# Patient Record
Sex: MALE | Race: WHITE | URBAN - METROPOLITAN AREA
[De-identification: names, ages, dates, MRNs, and addresses within clinical notes are randomized per-mention and may not be internally consistent; named-entity substitution may affect disease eponyms.]

---

## 2020-06-23 ENCOUNTER — OUTPATIENT (OUTPATIENT)
Dept: OUTPATIENT SERVICES | Facility: HOSPITAL | Age: 85
LOS: 1 days | End: 2020-06-23
Payer: MEDICARE

## 2020-06-23 VITALS
HEIGHT: 62.5 IN | RESPIRATION RATE: 16 BRPM | TEMPERATURE: 98 F | SYSTOLIC BLOOD PRESSURE: 136 MMHG | WEIGHT: 182.1 LBS | OXYGEN SATURATION: 97 % | HEART RATE: 76 BPM | DIASTOLIC BLOOD PRESSURE: 68 MMHG

## 2020-06-23 DIAGNOSIS — K40.90 UNILATERAL INGUINAL HERNIA, WITHOUT OBSTRUCTION OR GANGRENE, NOT SPECIFIED AS RECURRENT: ICD-10-CM

## 2020-06-23 DIAGNOSIS — Z98.890 OTHER SPECIFIED POSTPROCEDURAL STATES: Chronic | ICD-10-CM

## 2020-06-23 PROBLEM — Z00.00 ENCOUNTER FOR PREVENTIVE HEALTH EXAMINATION: Status: ACTIVE | Noted: 2020-06-23

## 2020-06-23 LAB
ANION GAP SERPL CALC-SCNC: 15 MMO/L — HIGH (ref 7–14)
BLD GP AB SCN SERPL QL: NEGATIVE — SIGNIFICANT CHANGE UP
BUN SERPL-MCNC: 25 MG/DL — HIGH (ref 7–23)
CALCIUM SERPL-MCNC: 9.4 MG/DL — SIGNIFICANT CHANGE UP (ref 8.4–10.5)
CHLORIDE SERPL-SCNC: 102 MMOL/L — SIGNIFICANT CHANGE UP (ref 98–107)
CO2 SERPL-SCNC: 19 MMOL/L — LOW (ref 22–31)
CREAT SERPL-MCNC: 1.55 MG/DL — HIGH (ref 0.5–1.3)
GLUCOSE SERPL-MCNC: 68 MG/DL — LOW (ref 70–99)
HCT VFR BLD CALC: 40 % — SIGNIFICANT CHANGE UP (ref 39–50)
HGB BLD-MCNC: 13.2 G/DL — SIGNIFICANT CHANGE UP (ref 13–17)
MCHC RBC-ENTMCNC: 29.1 PG — SIGNIFICANT CHANGE UP (ref 27–34)
MCHC RBC-ENTMCNC: 33 % — SIGNIFICANT CHANGE UP (ref 32–36)
MCV RBC AUTO: 88.3 FL — SIGNIFICANT CHANGE UP (ref 80–100)
NRBC # FLD: 0 K/UL — SIGNIFICANT CHANGE UP (ref 0–0)
PLATELET # BLD AUTO: 347 K/UL — SIGNIFICANT CHANGE UP (ref 150–400)
PMV BLD: 12.2 FL — SIGNIFICANT CHANGE UP (ref 7–13)
POTASSIUM SERPL-MCNC: 4.4 MMOL/L — SIGNIFICANT CHANGE UP (ref 3.5–5.3)
POTASSIUM SERPL-SCNC: 4.4 MMOL/L — SIGNIFICANT CHANGE UP (ref 3.5–5.3)
RBC # BLD: 4.53 M/UL — SIGNIFICANT CHANGE UP (ref 4.2–5.8)
RBC # FLD: 14.9 % — HIGH (ref 10.3–14.5)
RH IG SCN BLD-IMP: POSITIVE — SIGNIFICANT CHANGE UP
SODIUM SERPL-SCNC: 136 MMOL/L — SIGNIFICANT CHANGE UP (ref 135–145)
WBC # BLD: 11.67 K/UL — HIGH (ref 3.8–10.5)
WBC # FLD AUTO: 11.67 K/UL — HIGH (ref 3.8–10.5)

## 2020-06-23 PROCEDURE — 93010 ELECTROCARDIOGRAM REPORT: CPT

## 2020-06-23 RX ORDER — SODIUM CHLORIDE 9 MG/ML
1000 INJECTION, SOLUTION INTRAVENOUS
Refills: 0 | Status: DISCONTINUED | OUTPATIENT
Start: 2020-07-01 | End: 2020-07-02

## 2020-06-23 RX ORDER — BRIMONIDINE TARTRATE 2 MG/MG
1 SOLUTION/ DROPS OPHTHALMIC
Qty: 0 | Refills: 0 | DISCHARGE

## 2020-06-23 NOTE — H&P PST ADULT - NSICDXPASTSURGICALHX_GEN_ALL_CORE_FT
PAST SURGICAL HISTORY:  History of prostate surgery 2019    S/P ORIF (open reduction internal fixation) fracture right leg 1988

## 2020-06-23 NOTE — H&P PST ADULT - RS GEN PE MLT RESP DETAILS PC
good air movement/breath sounds equal/no rales/no wheezes/clear to auscultation bilaterally/respirations non-labored/no rhonchi/airway patent

## 2020-06-23 NOTE — H&P PST ADULT - ASSESSMENT
unilateral inguinal hernia, without obstruction Problem: unilateral inguinal hernia, without obstruction   Assessment and Plan: Pt is tentatively scheduled for repair of incarcerated left inguinal hernia with mesh for 7/1/20. Pre-op instructions provided. Pt given verbal and written instructions with teach back on chlorhexidine shampoo and pepcid. Pt verbalized understanding with return demonstration.   Pt is scheduled for COVID-19 test on 6/28    Problem: A fib  Assessment and Plan: cardiac evaluation with echo stress test and ekg in chart.

## 2020-06-23 NOTE — H&P PST ADULT - NEGATIVE NEUROLOGICAL SYMPTOMS
no generalized seizures/no syncope/no tremors/no transient paralysis/no weakness/no paresthesias/no headache

## 2020-06-23 NOTE — H&P PST ADULT - NSICDXPASTMEDICALHX_GEN_ALL_CORE_FT
PAST MEDICAL HISTORY:  Glaucoma     History of BPH     Inguinal hernia left    Macular degeneration PAST MEDICAL HISTORY:  Atrial fibrillation     Glaucoma     History of BPH     Inguinal hernia left    Macular degeneration

## 2020-06-23 NOTE — H&P PST ADULT - NSANTHOSAYNRD_GEN_A_CORE
No. SERJIO screening performed.  STOP BANG Legend: 0-2 = LOW Risk; 3-4 = INTERMEDIATE Risk; 5-8 = HIGH Risk

## 2020-06-23 NOTE — H&P PST ADULT - BACK EXAM
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   Normal device function with normal pacing impedance, sensing, and capture thresholds.  Acceptable battery longevity 7.0 years  3 AMS episodes longest 10 seconds  Episode of PMT      
In the setting of IHSS  Stable.   
ROM intact/normal shape/strength intact

## 2020-06-23 NOTE — H&P PST ADULT - HISTORY OF PRESENT ILLNESS
89 year old male presents to presurgical testing with diagnosis of unilateral inguinal hernia, without obstruction scheduled for repair of incarcerated left inguinal hernia with mesh. Pt complaining of bothersome left inguinal hernia, causing difficulty walking.

## 2020-06-28 ENCOUNTER — APPOINTMENT (OUTPATIENT)
Dept: DISASTER EMERGENCY | Facility: CLINIC | Age: 85
End: 2020-06-28

## 2020-06-28 DIAGNOSIS — Z01.818 ENCOUNTER FOR OTHER PREPROCEDURAL EXAMINATION: ICD-10-CM

## 2020-06-29 LAB — SARS-COV-2 N GENE NPH QL NAA+PROBE: NOT DETECTED

## 2020-06-30 ENCOUNTER — TRANSCRIPTION ENCOUNTER (OUTPATIENT)
Age: 85
End: 2020-06-30

## 2020-07-01 ENCOUNTER — RESULT REVIEW (OUTPATIENT)
Age: 85
End: 2020-07-01

## 2020-07-01 ENCOUNTER — INPATIENT (INPATIENT)
Facility: HOSPITAL | Age: 85
LOS: 1 days | Discharge: HOME | End: 2020-07-03
Attending: SURGERY | Admitting: SURGERY
Payer: MEDICARE

## 2020-07-01 VITALS
TEMPERATURE: 98 F | WEIGHT: 182.1 LBS | SYSTOLIC BLOOD PRESSURE: 155 MMHG | HEIGHT: 62.5 IN | DIASTOLIC BLOOD PRESSURE: 70 MMHG | RESPIRATION RATE: 16 BRPM | OXYGEN SATURATION: 98 % | HEART RATE: 60 BPM

## 2020-07-01 DIAGNOSIS — I48.91 UNSPECIFIED ATRIAL FIBRILLATION: ICD-10-CM

## 2020-07-01 DIAGNOSIS — Z98.890 OTHER SPECIFIED POSTPROCEDURAL STATES: Chronic | ICD-10-CM

## 2020-07-01 DIAGNOSIS — K40.90 UNILATERAL INGUINAL HERNIA, WITHOUT OBSTRUCTION OR GANGRENE, NOT SPECIFIED AS RECURRENT: ICD-10-CM

## 2020-07-01 LAB
ALBUMIN SERPL ELPH-MCNC: 3.3 G/DL — SIGNIFICANT CHANGE UP (ref 3.3–5)
ALP SERPL-CCNC: 111 U/L — SIGNIFICANT CHANGE UP (ref 40–120)
ALT FLD-CCNC: 19 U/L — SIGNIFICANT CHANGE UP (ref 4–41)
ANION GAP SERPL CALC-SCNC: 14 MMO/L — SIGNIFICANT CHANGE UP (ref 7–14)
APTT BLD: 35.5 SEC — SIGNIFICANT CHANGE UP (ref 27–36.3)
AST SERPL-CCNC: 19 U/L — SIGNIFICANT CHANGE UP (ref 4–40)
BILIRUB SERPL-MCNC: 0.3 MG/DL — SIGNIFICANT CHANGE UP (ref 0.2–1.2)
BUN SERPL-MCNC: 21 MG/DL — SIGNIFICANT CHANGE UP (ref 7–23)
CALCIUM SERPL-MCNC: 8.5 MG/DL — SIGNIFICANT CHANGE UP (ref 8.4–10.5)
CHLORIDE SERPL-SCNC: 106 MMOL/L — SIGNIFICANT CHANGE UP (ref 98–107)
CO2 SERPL-SCNC: 17 MMOL/L — LOW (ref 22–31)
CREAT SERPL-MCNC: 1.31 MG/DL — HIGH (ref 0.5–1.3)
GLUCOSE BLDC GLUCOMTR-MCNC: 118 MG/DL — HIGH (ref 70–99)
GLUCOSE BLDC GLUCOMTR-MCNC: 92 MG/DL — SIGNIFICANT CHANGE UP (ref 70–99)
GLUCOSE SERPL-MCNC: 133 MG/DL — HIGH (ref 70–99)
HCT VFR BLD CALC: 39 % — SIGNIFICANT CHANGE UP (ref 39–50)
HGB BLD-MCNC: 12.9 G/DL — LOW (ref 13–17)
INR BLD: 1.21 — HIGH (ref 0.88–1.17)
MCHC RBC-ENTMCNC: 28.8 PG — SIGNIFICANT CHANGE UP (ref 27–34)
MCHC RBC-ENTMCNC: 33.1 % — SIGNIFICANT CHANGE UP (ref 32–36)
MCV RBC AUTO: 87.1 FL — SIGNIFICANT CHANGE UP (ref 80–100)
NRBC # FLD: 0 K/UL — SIGNIFICANT CHANGE UP (ref 0–0)
PLATELET # BLD AUTO: 286 K/UL — SIGNIFICANT CHANGE UP (ref 150–400)
PMV BLD: 9.6 FL — SIGNIFICANT CHANGE UP (ref 7–13)
POTASSIUM SERPL-MCNC: 3.8 MMOL/L — SIGNIFICANT CHANGE UP (ref 3.5–5.3)
POTASSIUM SERPL-SCNC: 3.8 MMOL/L — SIGNIFICANT CHANGE UP (ref 3.5–5.3)
PROT SERPL-MCNC: 6.2 G/DL — SIGNIFICANT CHANGE UP (ref 6–8.3)
PROTHROM AB SERPL-ACNC: 14 SEC — HIGH (ref 9.8–13.1)
RBC # BLD: 4.48 M/UL — SIGNIFICANT CHANGE UP (ref 4.2–5.8)
RBC # FLD: 15.2 % — HIGH (ref 10.3–14.5)
RH IG SCN BLD-IMP: POSITIVE — SIGNIFICANT CHANGE UP
SODIUM SERPL-SCNC: 137 MMOL/L — SIGNIFICANT CHANGE UP (ref 135–145)
WBC # BLD: 13.22 K/UL — HIGH (ref 3.8–10.5)
WBC # FLD AUTO: 13.22 K/UL — HIGH (ref 3.8–10.5)

## 2020-07-01 PROCEDURE — 70450 CT HEAD/BRAIN W/O DYE: CPT | Mod: 26

## 2020-07-01 PROCEDURE — 88302 TISSUE EXAM BY PATHOLOGIST: CPT | Mod: 26

## 2020-07-01 RX ORDER — ENOXAPARIN SODIUM 100 MG/ML
40 INJECTION SUBCUTANEOUS DAILY
Refills: 0 | Status: DISCONTINUED | OUTPATIENT
Start: 2020-07-02 | End: 2020-07-03

## 2020-07-01 RX ORDER — ACETAMINOPHEN 500 MG
1000 TABLET ORAL EVERY 6 HOURS
Refills: 0 | Status: COMPLETED | OUTPATIENT
Start: 2020-07-01 | End: 2020-07-02

## 2020-07-01 RX ADMIN — Medication 400 MILLIGRAM(S): at 21:47

## 2020-07-01 NOTE — CONSULT NOTE ADULT - ASSESSMENT
NOTE INCOMPLETE Assessment: 88yo unknown handedness man with h/o afib not on AC (unclear reason), macular degeneration, hearing loss, glaucoma, presenting as code stroke with LKW prior to surgery (2:30p) for slurred speech 20 min after surgery. Neurological examination demonstrates mild dysarthria, baseline left facial asymmetry. CTH noncon negative per verbal read.     Impression: mild dysarthria 2/2 patient likely from post-op anesthesia, low suspicion of stroke      Recommendations:  [ ] no further neurological workup  [ ] patient should discuss with PCP/cardiologist about anticoagulation for atrial fibrillation vs watchman  [ ] f/u with Dr. Sexton Stroke Attending as outpatient  [phone]    -Management & disposition discussed with Stroke attending, Dr. Libman; to d/w Neurology Attending Dr. Stevenson

## 2020-07-01 NOTE — BRIEF OPERATIVE NOTE - NSICDXBRIEFPROCEDURE_GEN_ALL_CORE_FT
PROCEDURES:  Open repair of incarcerated inguinal hernia using mesh in adult 01-Jul-2020 17:49:20  Idalia Soto

## 2020-07-01 NOTE — ASU PATIENT PROFILE, ADULT - PSH
History of prostate surgery  2019  S/P ORIF (open reduction internal fixation) fracture  right leg 1988

## 2020-07-01 NOTE — BRIEF OPERATIVE NOTE - OPERATION/FINDINGS
Reduction of abdominal contents from hernia sac was successful. Large left indirect inguinal hernia repaired with reinforcement by implementation of synthetic mesh between abdominal internal oblique muscle and aponeurosis of abdominal external oblique muscle. Small lipoma was excised along with hernia sac.

## 2020-07-02 ENCOUNTER — TRANSCRIPTION ENCOUNTER (OUTPATIENT)
Age: 85
End: 2020-07-02

## 2020-07-02 LAB
ANION GAP SERPL CALC-SCNC: 15 MMO/L — HIGH (ref 7–14)
BUN SERPL-MCNC: 22 MG/DL — SIGNIFICANT CHANGE UP (ref 7–23)
CALCIUM SERPL-MCNC: 8.9 MG/DL — SIGNIFICANT CHANGE UP (ref 8.4–10.5)
CHLORIDE SERPL-SCNC: 106 MMOL/L — SIGNIFICANT CHANGE UP (ref 98–107)
CO2 SERPL-SCNC: 16 MMOL/L — LOW (ref 22–31)
CREAT SERPL-MCNC: 1.35 MG/DL — HIGH (ref 0.5–1.3)
GLUCOSE SERPL-MCNC: 115 MG/DL — HIGH (ref 70–99)
HCT VFR BLD CALC: 37.7 % — LOW (ref 39–50)
HGB BLD-MCNC: 12.7 G/DL — LOW (ref 13–17)
MAGNESIUM SERPL-MCNC: 1.6 MG/DL — SIGNIFICANT CHANGE UP (ref 1.6–2.6)
MCHC RBC-ENTMCNC: 29 PG — SIGNIFICANT CHANGE UP (ref 27–34)
MCHC RBC-ENTMCNC: 33.7 % — SIGNIFICANT CHANGE UP (ref 32–36)
MCV RBC AUTO: 86.1 FL — SIGNIFICANT CHANGE UP (ref 80–100)
NRBC # FLD: 0 K/UL — SIGNIFICANT CHANGE UP (ref 0–0)
PHOSPHATE SERPL-MCNC: 3.9 MG/DL — SIGNIFICANT CHANGE UP (ref 2.5–4.5)
PLATELET # BLD AUTO: 263 K/UL — SIGNIFICANT CHANGE UP (ref 150–400)
PMV BLD: 10.2 FL — SIGNIFICANT CHANGE UP (ref 7–13)
POTASSIUM SERPL-MCNC: 4 MMOL/L — SIGNIFICANT CHANGE UP (ref 3.5–5.3)
POTASSIUM SERPL-SCNC: 4 MMOL/L — SIGNIFICANT CHANGE UP (ref 3.5–5.3)
RBC # BLD: 4.38 M/UL — SIGNIFICANT CHANGE UP (ref 4.2–5.8)
RBC # FLD: 15.1 % — HIGH (ref 10.3–14.5)
SODIUM SERPL-SCNC: 137 MMOL/L — SIGNIFICANT CHANGE UP (ref 135–145)
TROPONIN T, HIGH SENSITIVITY: 45 NG/L — SIGNIFICANT CHANGE UP (ref ?–14)
TROPONIN T, HIGH SENSITIVITY: 49 NG/L — SIGNIFICANT CHANGE UP (ref ?–14)
TROPONIN T, HIGH SENSITIVITY: 56 NG/L — CRITICAL HIGH (ref ?–14)
WBC # BLD: 15.83 K/UL — HIGH (ref 3.8–10.5)
WBC # FLD AUTO: 15.83 K/UL — HIGH (ref 3.8–10.5)

## 2020-07-02 PROCEDURE — 99232 SBSQ HOSP IP/OBS MODERATE 35: CPT

## 2020-07-02 PROCEDURE — 99221 1ST HOSP IP/OBS SF/LOW 40: CPT

## 2020-07-02 PROCEDURE — 93010 ELECTROCARDIOGRAM REPORT: CPT

## 2020-07-02 RX ORDER — BRIMONIDINE TARTRATE 2 MG/MG
1 SOLUTION/ DROPS OPHTHALMIC
Refills: 0 | Status: DISCONTINUED | OUTPATIENT
Start: 2020-07-02 | End: 2020-07-03

## 2020-07-02 RX ORDER — LATANOPROST 0.05 MG/ML
1 SOLUTION/ DROPS OPHTHALMIC; TOPICAL AT BEDTIME
Refills: 0 | Status: DISCONTINUED | OUTPATIENT
Start: 2020-07-02 | End: 2020-07-03

## 2020-07-02 RX ORDER — SODIUM CHLORIDE 9 MG/ML
500 INJECTION, SOLUTION INTRAVENOUS ONCE
Refills: 0 | Status: DISCONTINUED | OUTPATIENT
Start: 2020-07-02 | End: 2020-07-03

## 2020-07-02 RX ORDER — MAGNESIUM SULFATE 500 MG/ML
2 VIAL (ML) INJECTION
Refills: 0 | Status: COMPLETED | OUTPATIENT
Start: 2020-07-02 | End: 2020-07-02

## 2020-07-02 RX ADMIN — ENOXAPARIN SODIUM 40 MILLIGRAM(S): 100 INJECTION SUBCUTANEOUS at 11:54

## 2020-07-02 RX ADMIN — Medication 400 MILLIGRAM(S): at 03:40

## 2020-07-02 RX ADMIN — Medication 400 MILLIGRAM(S): at 08:47

## 2020-07-02 RX ADMIN — Medication 50 GRAM(S): at 09:25

## 2020-07-02 RX ADMIN — Medication 50 GRAM(S): at 08:47

## 2020-07-02 RX ADMIN — Medication 400 MILLIGRAM(S): at 15:50

## 2020-07-02 RX ADMIN — LATANOPROST 1 DROP(S): 0.05 SOLUTION/ DROPS OPHTHALMIC; TOPICAL at 22:08

## 2020-07-02 RX ADMIN — BRIMONIDINE TARTRATE 1 DROP(S): 2 SOLUTION/ DROPS OPHTHALMIC at 17:51

## 2020-07-02 NOTE — CONSULT NOTE ADULT - ASSESSMENT
Assessment- 89/M with AFib not on AC s/p hernia surgery had e/o dysarthria, and facial asymmetry with subsequent troponin elevations, and CT brain showing moderate ischemic disease.     Plan-  1. Ischemia evaluation with a pharmacologic nuclear stress test prior to discharge if possible. Also recommend B/L carotid duplex.  2. Will start ASA 81 mg/d and atorvastatin 40 mg QHS-will defer to Neuro on consideration of clopidogrel.  3. Will recommend full discussion and strong recommendation of NOACs-may be done as outpatient.  4. If possible, obtain records from Dr Damon's office.    Thanks for the opportunity of participating in the care of this pt, please call [facility]  Cardiology  [phone]  For on-call coverage-  [email or web address] / password : [email or web address]

## 2020-07-02 NOTE — PROGRESS NOTE ADULT - ASSESSMENT
89yoM with PMHx of Afib, s/p LIH, post op course c/b stroke code, now with resolution of neurologic symptoms and negative CT Head. Overnight experienced bradycardia and increase in troponins    -Pain control  -Trend Troponins  -F/U Labs   -PT, Ambulation/OOB  -CLD --> ADAT   -F/U outpatient cardiologist   -DVT PPx    ATeam  [id number]

## 2020-07-02 NOTE — PHYSICAL THERAPY INITIAL EVALUATION ADULT - PERTINENT HX OF CURRENT PROBLEM, REHAB EVAL
89 year old man with h/o afib not on AC, macular degeneration, hearing loss, glaucoma, presenting as code stroke for slurred speech 20 min after surgery. Patient underwent inguinal hernia surgery and per nursing, after 20 minutes patient had slurred speech and were concerned for a stroke. CT head no acute findings.

## 2020-07-02 NOTE — DISCHARGE NOTE PROVIDER - NSDCCPCAREPLAN_GEN_ALL_CORE_FT
PRINCIPAL DISCHARGE DIAGNOSIS  Diagnosis: Unilateral inguinal hernia  Assessment and Plan of Treatment:       SECONDARY DISCHARGE DIAGNOSES  Diagnosis: Atrial fibrillation  Assessment and Plan of Treatment:

## 2020-07-02 NOTE — PHYSICAL THERAPY INITIAL EVALUATION ADULT - PLANNED THERAPY INTERVENTIONS, PT EVAL
gait training/bed mobility training/strengthening/transfer training/Patient left sitting in chair, in NAD, call bell in reach, all lines intact./balance training

## 2020-07-02 NOTE — CHART NOTE - NSCHARTNOTEFT_GEN_A_CORE
Pt developed Afib with pulse dropping to 36. Was stable with HR in 70s. EKG, CBC, BMP, Mg, Phos, and troponins.  Pt is stable. Will continue to monitor pt.

## 2020-07-02 NOTE — CONSULT NOTE ADULT - SUBJECTIVE AND OBJECTIVE BOX
88yo man with h/o afib not on AC (pt reportedly declined in the past-doesnot know about NOACs apparently), macular degeneration, hearing loss, glaucoma, presenting as code stroke 20 min after surgery. Patient underwent inguinal hernia surgery -and had a rapid response alert for dysarthria and slurred speech which improved spontaneously (unclear time window). CT head revealed no obvious ischemic /HGEic lesions-but had significant microvascular ischemic changes. No imaging has been done for assessment of IC circulation per my review.     Pt reports that he has frequently been feeling fatigued in the recent past-and has restricted his activities. Baseline mobility limited, and <4 METs. Denies chest pain at rest. Does report exertional dyspnea. Says he refused warfarin in the past as he lost few friends due to coumadin. When told about NOACs-he says he wants to learn more, but would defer for now. Worried about financial impact of hospitalization and testing. Shows me a small prescription paper from Dr Janice Damon ([place], [place]) which states 'no contraindication to surgery based on echo and stress test'-doesnt provide results of above studies.    Home Medications:  Advil: 1 tab(s) orally 2 times a day last dose on 6/24/20 (01 Jul 2020 10:44)  brimonidine 0.2% ophthalmic solution: 1 drop(s) to each affected eye 2 times a day (01 Jul 2020 10:44)  latanoprost 0.005% ophthalmic solution: 1 drop(s) to each affected eye once a day (in the evening) (01 Jul 2020 10:44)    Allergies    No Known Allergies    Intolerances    PAST MEDICAL & SURGICAL HISTORY:  Atrial fibrillation  Macular degeneration  History of BPH  Glaucoma  Inguinal hernia: left  History of prostate surgery: 2019  S/P ORIF (open reduction internal fixation) fracture: right leg 1988    Social History:   Lives with daughter.    FAMILY HISTORY:  Reports no family history of premature CAD or SCD-but states 'all of my 8 brothers are dead-3 in the war, rest of natural causes'.    Vital Signs Last 24 Hrs  T(C): 36.6 (02 Jul 2020 16:45), Max: 36.9 (02 Jul 2020 08:47)  T(F): 97.8 (02 Jul 2020 16:45), Max: 98.5 (02 Jul 2020 08:47)  HR: 66 (02 Jul 2020 16:45) (53 - 90)  BP: 123/60 (02 Jul 2020 16:45) (118/57 - 159/66)  BP(mean): 84 (02 Jul 2020 08:47) (84 - 84)  RR: 18 (02 Jul 2020 16:45) (16 - 20)  SpO2: 98% (02 Jul 2020 16:45) (97% - 99%)    Exam:  General Appearance:  This is a 89-year-old male, who answers questions appropriately and currently is in no apparent distress.  Skin:  Warm and dry without exanthem.    HEENT:  Normocephalic and atraumatic. Extraocular movements are intact.    Lungs:  Breath sounds are clear bilaterally without rales, rhonchi or wheezing.    Heart:  No elevation of JVP.  Irregular rhythm. S1-normal, S2-mild prolongation of A2-P2 split, with no appreciable gallops, rubs or extra heart sounds.  2/6 SM in LLSB with radx to carotids.  Abdomen:  Soft, nondistended in all quadrants. Mild tenderness to palpation RUQ+. Normal bowel sounds.  No palpable masses.    Peripheral Vascular:  Radial pulses are 2+ bilaterally.   Extremities:  Warm without clubbing, edema or cyanosis.    Neurological:  The patient is oriented to person, place and time.  Strength and sensation are grossly intact.  Face is symmetric.    EKG (June 23, 20-personally reviewed by me)-AFib with non-specific ST-T changes especially in the infero-lateral leads    CT Brain-No hydrocephalus, acute intracranial hemorrhage, mass effect, or brain edema.    Moderate white matter microvascular ischemic disease.
HPI: 88yo unknown handedness man with h/o afib not on AC (unclear reason), macular degeneration, hearing loss, glaucoma, presenting as code stroke with LKW prior to surgery (2:30p) for slurred speech 20 min after surgery. Patient underwent inguinal hernia surgery and per nursing, after 20 minutes patient had slurred speech and were concerned for a stroke.     (Stroke only)  NIHSS: 12 (though patient was spontaneously moving all extremities and withdrew equally to all extremities)  MRS: unknown at this time    REVIEW OF SYSTEMS	    A 10-system ROS was performed and is negative except for those items noted above and/or in the HPI.    PAST MEDICAL & SURGICAL HISTORY:  Atrial fibrillation  Macular degeneration  History of BPH  Glaucoma  Inguinal hernia: left  History of prostate surgery: 2019  S/P ORIF (open reduction internal fixation) fracture: right leg 1988    MEDICATIONS (HOME):  Home Medications:  Advil: 1 tab(s) orally 2 times a day last dose on 6/24/20 (01 Jul 2020 10:44)  brimonidine 0.2% ophthalmic solution: 1 drop(s) to each affected eye 2 times a day (01 Jul 2020 10:44)  latanoprost 0.005% ophthalmic solution: 1 drop(s) to each affected eye once a day (in the evening) (01 Jul 2020 10:44)    MEDICATIONS  (STANDING):  lactated ringers. 1000 milliLiter(s) (30 mL/Hr) IV Continuous <Continuous>    MEDICATIONS  (PRN):    ALLERGIES/INTOLERANCES:  Allergies  No Known Allergies    VITALS & EXAMINATION:  Vital Signs Last 24 Hrs  T(C): 36.8 (01 Jul 2020 10:15), Max: 36.8 (01 Jul 2020 10:15)  T(F): 98.2 (01 Jul 2020 10:15), Max: 98.2 (01 Jul 2020 10:15)  HR: 60 (01 Jul 2020 10:15) (60 - 60)  BP: 155/70 (01 Jul 2020 10:15) (155/70 - 155/70)  BP(mean): --  RR: 16 (01 Jul 2020 10:15) (16 - 16)  SpO2: 98% (01 Jul 2020 10:15) (98% - 98%)    Neurological (>12):  MS: eyes open, after prompting patient knows where he is, knows the month, speech mildly dysarthric, intact naming, difficult to evaluate repetition; follows some simple commands     CNs: PERRL (R = 3mm, L = 3mm). VFF to threat b/l. EOMI no nystagmus, mild left nasolabial flattening (appears to be baseline), Hearing grossly normal (rubbing fingers) b/l.      Motor: patient's limbs hit bed, arms within 10 seconds, legs within 5 seconds - however, patient noted to move all extremities equally and spontaneously	     Sensation: withdraws all extremities equally to noxious stimuli    Coordination: unable to test    Gait: deferred    LABORATORY:  CBC                       12.9   13.22 )-----------( 286      ( 01 Jul 2020 16:05 )             39.0       STUDIES & IMAGING:  Studies (EKG, EEG, EMG, etc):     Radiology (XR, CT, MR, U/S, TTE/DAVIDSON):    pending [facility] noncon read

## 2020-07-02 NOTE — DISCHARGE NOTE PROVIDER - PROVIDER TOKENS
PROVIDER:[TOKEN:[[id number]],FOLLOWUP:[1 week]] PROVIDER:[TOKEN:[[id number]],FOLLOWUP:[1 week]],PROVIDER:[TOKEN:[[id number]],FOLLOWUP:[2 weeks]]

## 2020-07-02 NOTE — DISCHARGE NOTE PROVIDER - NSDCCPTREATMENT_GEN_ALL_CORE_FT
PRINCIPAL PROCEDURE  Procedure: Open repair of incarcerated inguinal hernia using mesh in adult  Findings and Treatment:

## 2020-07-02 NOTE — DISCHARGE NOTE PROVIDER - HOSPITAL COURSE
89yoM with PMHx of Afib (no anticoagulation) presented yesterday for an elective Left Inguinal Hernia Repair. Post-op a Code Stroke was called, patient was noted to be dysarthric. STAT CT Head revealed no evidence of stroke and patients symptoms subsequently improved. He was admitted and overnight noted to be bradycardic while in Afib. EKG confirmed afib. Troponins slightly elevated but downtrending and EKG unchanged from previous, patient also denied chest pain. Outside cardiologist contacted......... Patient advanced to regular diet and PT ordered. Patient remained hemodynamically stable on POD #1 and tolerated a diet, his pain is well controlled and he is stable for discharge. 89yoM with PMHx of Afib (no anticoagulation) presented yesterday for an elective Left Inguinal Hernia Repair. Post-op a Code Stroke was called, patient was noted to be dysarthric. STAT CT Head revealed no evidence of stroke and patients symptoms subsequently improved. He was admitted and overnight noted to be bradycardic while in Afib. EKG confirmed afib. Troponins slightly elevated but downtrending and EKG unchanged from previous, patient also denied chest pain. Patient advanced to regular diet and PT ordered. Patient remained hemodynamically stable on POD #1 and tolerated a diet, his pain is well controlled and he is stable for discharge. On POD#2, patient underwent bilateral carotid duplex ultrasound. As per cardiology, patient is stable for discharge and should continue with ASA/statin. Pt should have close outpatient cardiology follow up, and discuss use of anticoagulation for AF w/ outpatient cardiologist. 89yoM with PMHx of Afib (no anticoagulation) presented yesterday for an elective Left Inguinal Hernia Repair. Post-op a Code Stroke was called, patient was noted to be dysarthric. STAT CT Head revealed no evidence of stroke and patients symptoms subsequently improved. He was admitted and overnight noted to be bradycardic while in Afib. EKG confirmed afib. Troponins slightly elevated but downtrending and EKG unchanged from previous, patient also denied chest pain. Patient advanced to regular diet and PT ordered. Patient remained hemodynamically stable on POD #1 and tolerated a diet, his pain is well controlled and he is stable for discharge. On POD#2, patient underwent bilateral carotid duplex ultrasound. As per cardiology, patient is stable for discharge and should continue with ASA/statin. Pt should have close outpatient cardiology (Dr. Janice Damon) follow up within the next week, and discuss use of anticoagulation for AF w/ outpatient cardiologist. 89yo M with PMH of Afib (no anticoagulation) presented yesterday for an elective Left Inguinal Hernia Repair. Post-op a Code Stroke was called, patient was noted to be dysarthric. STAT CT Head revealed no evidence of stroke and patients symptoms subsequently improved. He was admitted and overnight noted to be bradycardic while in Afib. EKG confirmed afib. Troponins slightly elevated but downtrending and EKG unchanged from previous, patient also denied chest pain. Patient advanced to regular diet and PT ordered. Patient remained hemodynamically stable on POD #1 and tolerated a diet, his pain is well controlled and he is stable for discharge. On POD#2, patient underwent bilateral carotid duplex ultrasound. As per cardiology, patient is stable for discharge and should continue with ASA/statin. Pt should have close outpatient cardiology (Dr. Janice Damon) follow up within the next week, and discuss use of anticoagulation for AF w/ outpatient cardiologist.

## 2020-07-02 NOTE — DISCHARGE NOTE PROVIDER - NSDCMRMEDTOKEN_GEN_ALL_CORE_FT
Advil: 1 tab(s) orally 2 times a day last dose on 6/24/20  brimonidine 0.2% ophthalmic solution: 1 drop(s) to each affected eye 2 times a day  latanoprost 0.005% ophthalmic solution: 1 drop(s) to each affected eye once a day (in the evening) Advil: 1 tab(s) orally 2 times a day last dose on 6/24/20  amoxicillin-clavulanate 875 mg-125 mg oral tablet: 1 tab(s) orally every 12 hours  aspirin 81 mg oral tablet, chewable: 1 tab(s) orally once a day  atorvastatin 40 mg oral tablet: 1 tab(s) orally once a day (at bedtime)  brimonidine 0.2% ophthalmic solution: 1 drop(s) to each affected eye 2 times a day  latanoprost 0.005% ophthalmic solution: 1 drop(s) to each affected eye once a day (in the evening)

## 2020-07-02 NOTE — DISCHARGE NOTE PROVIDER - CARE PROVIDER_API CALL
Tan Yoo  SURGERY  [address] [place] [address]  Phone: [phone]  Fax: [phone]  Follow Up Time: 1 week Tan Yoo  SURGERY  [address] [place] [address]  Phone: [phone]  Fax: [phone]  Follow Up Time: 1 week    Rowdy Sexton  NEUROLOGY  [address] [facility], [address]  [place] [address]  Phone: [phone]  Fax: [phone]  Follow Up Time: 2 weeks

## 2020-07-02 NOTE — PROGRESS NOTE ADULT - SUBJECTIVE AND OBJECTIVE BOX
89yoM with PMHx of Afib (no A/C) presented for elective LIH repair. Post-op course complicated by Stroke called in PACU. Overnight developed bradycardia, EKG revealed afib. Troponins slightly elevated overnight. Patient does not demonstrate any signs of dysarthria this morning or weakness. He denies chest pain, shortness of breath, nausea or vomiting.     Vital Signs  T(C): 36.6 (02 Jul 2020 06:07), Max: 36.8 (01 Jul 2020 10:15)  T(F): 97.9 (02 Jul 2020 06:07), Max: 98.2 (01 Jul 2020 10:15)  HR: 53 (02 Jul 2020 06:07) (48 - 88)  BP: 150/61 (02 Jul 2020 06:07) (103/52 - 191/161)  BP(mean): 73 (01 Jul 2020 19:30) (64 - 168)  RR: 18 (02 Jul 2020 06:07) (9 - 29)  SpO2: 97% (02 Jul 2020 06:07) (93% - 100%)    I&O's Detail    01 Jul 2020 07:01  -  02 Jul 2020 07:00  --------------------------------------------------------  IN:    lactated ringers.: 120 mL    Oral Fluid: 120 mL  Total IN: 240 mL    OUT:  Total OUT: 0 mL    Total NET: 240 mL                          12.7   15.83 )-----------( 263      ( 02 Jul 2020 02:05 )             37.7   07-02    137  |  106  |  22  ----------------------------<  115<H>  4.0   |  16<L>  |  1.35<H>    Ca    8.9      02 Jul 2020 02:05  Phos  3.9     07-02  Mg     1.6     07-02    TPro  6.2  /  Alb  3.3  /  TBili  0.3  /  DBili  x   /  AST  19  /  ALT  19  /  AlkPhos  111  07-01    Gen: NAD, A&Ox3  Chest: non labored breathing  Neuro: CNII-XII grossly intact, sensation/motor intact, strength 5/5  Abd: soft, NTND, no evidence of rebound or guarding, incision is c/d/i  Ext: warm, well perfused

## 2020-07-02 NOTE — DISCHARGE NOTE PROVIDER - NSDCFUADDINST_GEN_ALL_CORE_FT
WOUND CARE:  Please keep incisions clean and dry. Please do not Scrub or rub incisions. Do not use lotion or powder on incisions.   BATHING: Please do not submerge wound underwater. You may shower and/or sponge bathe.  ACTIVITY: No heavy lifting or straining. Otherwise, you may return to your usual level of physical activity. If you are taking narcotic pain medication (such as Percocet) DO NOT drive a car, operate machinery or make important decisions.  DIET: Return to your usual diet.  NOTIFY YOUR SURGEON IF: You have any bleeding that does not stop, any pus draining from your wound(s), any fever (over 100.4 F) or chills, persistent nausea/vomiting, persistent diarrhea, or if your pain is not controlled on your discharge pain medications.  FOLLOW-UP: Please follow up with your primary care physician in one week regarding your hospitalization. Please follow-up with your surgeon, Dr. Yoo, within 7 days following discharge- please call to schedule an appointment.

## 2020-07-03 ENCOUNTER — TRANSCRIPTION ENCOUNTER (OUTPATIENT)
Age: 85
End: 2020-07-03

## 2020-07-03 VITALS
HEART RATE: 80 BPM | RESPIRATION RATE: 18 BRPM | OXYGEN SATURATION: 100 % | DIASTOLIC BLOOD PRESSURE: 56 MMHG | SYSTOLIC BLOOD PRESSURE: 115 MMHG | TEMPERATURE: 97 F

## 2020-07-03 LAB
ANION GAP SERPL CALC-SCNC: 13 MMO/L — SIGNIFICANT CHANGE UP (ref 7–14)
BUN SERPL-MCNC: 23 MG/DL — SIGNIFICANT CHANGE UP (ref 7–23)
CALCIUM SERPL-MCNC: 9 MG/DL — SIGNIFICANT CHANGE UP (ref 8.4–10.5)
CHLORIDE SERPL-SCNC: 103 MMOL/L — SIGNIFICANT CHANGE UP (ref 98–107)
CO2 SERPL-SCNC: 19 MMOL/L — LOW (ref 22–31)
CREAT SERPL-MCNC: 1.5 MG/DL — HIGH (ref 0.5–1.3)
GLUCOSE SERPL-MCNC: 80 MG/DL — SIGNIFICANT CHANGE UP (ref 70–99)
HCT VFR BLD CALC: 39.5 % — SIGNIFICANT CHANGE UP (ref 39–50)
HGB BLD-MCNC: 13 G/DL — SIGNIFICANT CHANGE UP (ref 13–17)
MAGNESIUM SERPL-MCNC: 2.6 MG/DL — SIGNIFICANT CHANGE UP (ref 1.6–2.6)
MCHC RBC-ENTMCNC: 28.6 PG — SIGNIFICANT CHANGE UP (ref 27–34)
MCHC RBC-ENTMCNC: 32.9 % — SIGNIFICANT CHANGE UP (ref 32–36)
MCV RBC AUTO: 86.8 FL — SIGNIFICANT CHANGE UP (ref 80–100)
NRBC # FLD: 0 K/UL — SIGNIFICANT CHANGE UP (ref 0–0)
PHOSPHATE SERPL-MCNC: 2.6 MG/DL — SIGNIFICANT CHANGE UP (ref 2.5–4.5)
PLATELET # BLD AUTO: 285 K/UL — SIGNIFICANT CHANGE UP (ref 150–400)
PMV BLD: 11.2 FL — SIGNIFICANT CHANGE UP (ref 7–13)
POTASSIUM SERPL-MCNC: 4.4 MMOL/L — SIGNIFICANT CHANGE UP (ref 3.5–5.3)
POTASSIUM SERPL-SCNC: 4.4 MMOL/L — SIGNIFICANT CHANGE UP (ref 3.5–5.3)
RBC # BLD: 4.55 M/UL — SIGNIFICANT CHANGE UP (ref 4.2–5.8)
RBC # FLD: 15.2 % — HIGH (ref 10.3–14.5)
SODIUM SERPL-SCNC: 135 MMOL/L — SIGNIFICANT CHANGE UP (ref 135–145)
WBC # BLD: 15.05 K/UL — HIGH (ref 3.8–10.5)
WBC # FLD AUTO: 15.05 K/UL — HIGH (ref 3.8–10.5)

## 2020-07-03 PROCEDURE — 93880 EXTRACRANIAL BILAT STUDY: CPT | Mod: 26

## 2020-07-03 RX ORDER — ATORVASTATIN CALCIUM 80 MG/1
40 TABLET, FILM COATED ORAL AT BEDTIME
Refills: 0 | Status: DISCONTINUED | OUTPATIENT
Start: 2020-07-03 | End: 2020-07-03

## 2020-07-03 RX ORDER — ASPIRIN/CALCIUM CARB/MAGNESIUM 324 MG
1 TABLET ORAL
Qty: 0 | Refills: 0 | DISCHARGE
Start: 2020-07-03

## 2020-07-03 RX ORDER — ATORVASTATIN CALCIUM 80 MG/1
1 TABLET, FILM COATED ORAL
Qty: 0 | Refills: 0 | DISCHARGE
Start: 2020-07-03

## 2020-07-03 RX ORDER — ASPIRIN/CALCIUM CARB/MAGNESIUM 324 MG
81 TABLET ORAL DAILY
Refills: 0 | Status: DISCONTINUED | OUTPATIENT
Start: 2020-07-03 | End: 2020-07-03

## 2020-07-03 RX ADMIN — BRIMONIDINE TARTRATE 1 DROP(S): 2 SOLUTION/ DROPS OPHTHALMIC at 17:47

## 2020-07-03 RX ADMIN — BRIMONIDINE TARTRATE 1 DROP(S): 2 SOLUTION/ DROPS OPHTHALMIC at 07:29

## 2020-07-03 RX ADMIN — ENOXAPARIN SODIUM 40 MILLIGRAM(S): 100 INJECTION SUBCUTANEOUS at 11:18

## 2020-07-03 RX ADMIN — Medication 1 TABLET(S): at 17:46

## 2020-07-03 NOTE — CHART NOTE - NSCHARTNOTEFT_GEN_A_CORE
Paged to bedside by RN for episode of bradycardia to 37 on telemetry. Bradycardia was not sustained longer than 20-30 seconds and HR was in 80s upon assessment. No hypotension. Patient was asleep throughout the episode and my assessment. As this episode was not sustained and patient is at baseline, no intervention is warranted. Patient is closely followed by cardiology inpatient.

## 2020-07-03 NOTE — PROGRESS NOTE ADULT - ASSESSMENT
89 M with PMH of Afib, s/p LIH, post op course c/b stroke code, now with resolution of neurologic symptoms and negative CT Head, and Afib with RVR, currently stable.    - augmentin 875mg BID  - duplex carotid US, per cardiology      A team surgery, [id number] 89 M with PMH of Afib, s/p LIH, post op course c/b stroke code, now with resolution of neurologic symptoms and negative CT Head, and Afib, currently stable.    - out of bed and ambulate  - augmentin 875mg BID  - duplex carotid US, per cardiology  - dc, with cards recommendation      A team surgery, [id number]

## 2020-07-03 NOTE — PROGRESS NOTE ADULT - SUBJECTIVE AND OBJECTIVE BOX
POST OPERATIVE DAY #: 2    SUBJECTIVE:   Pt seen and examined at bedside. Overnight pt had HR 37, but was stable w Pt denies any abdominal pain, nausea, vomting, or SOB. Pt is overall doing well.         Vital Signs Last 24 Hrs  T(C): 36.1 (02 Jul 2020 21:27), Max: 36.9 (02 Jul 2020 08:47)  T(F): 97 (02 Jul 2020 21:27), Max: 98.5 (02 Jul 2020 08:47)  HR: 80 (02 Jul 2020 21:27) (60 - 90)  BP: 150/75 (02 Jul 2020 21:27) (118/57 - 159/66)  BP(mean): 84 (02 Jul 2020 08:47) (84 - 84)  RR: 18 (02 Jul 2020 21:27) (16 - 18)  SpO2: 97% (02 Jul 2020 21:27) (97% - 99%)      PHYSICAL EXAM:  Constitutional: Patient well nourish. well developed.  Neuro: AAOx3  Gastrointestinal: Abdomen soft, non distended, non tenderness  Wound: C/D/I  Extremities:  No edema, no calf tenderrness,            I&O's Summary    02 Jul 2020 07:01  -  03 Jul 2020 07:00  --------------------------------------------------------  IN: 0 mL / OUT: 300 mL / NET: -300 mL      I&O's Detail    02 Jul 2020 07:01  -  03 Jul 2020 07:00  --------------------------------------------------------  IN:  Total IN: 0 mL    OUT:    Voided: 300 mL  Total OUT: 300 mL    Total NET: -300 mL          MEDICATIONS  (STANDING):  brimonidine 0.2% Ophthalmic Solution 1 Drop(s) Both EYES two times a day  enoxaparin Injectable 40 milliGRAM(s) SubCutaneous daily  lactated ringers Bolus 500 milliLiter(s) IV Bolus once  latanoprost 0.005% Ophthalmic Solution 1 Drop(s) Both EYES at bedtime    MEDICATIONS  (PRN):      LABS:                        12.7   15.83 )-----------( 263      ( 02 Jul 2020 02:05 )             37.7     07-02    137  |  106  |  22  ----------------------------<  115<H>  4.0   |  16<L>  |  1.35<H>    Ca    8.9      02 Jul 2020 02:05  Phos  3.9     07-02  Mg     1.6     07-02    TPro  6.2  /  Alb  3.3  /  TBili  0.3  /  DBili  x   /  AST  19  /  ALT  19  /  AlkPhos  111  07-01    PT/INR - ( 01 Jul 2020 16:05 )   PT: 14.0 SEC;   INR: 1.21          PTT - ( 01 Jul 2020 16:05 )  PTT:35.5 SEC POST OPERATIVE DAY #: 2    SUBJECTIVE:   Pt seen and examined at bedside. Overnight pt had HR 37, but was stable. No hypotension. Pt denies any abdominal pain, nausea, vomting, or SOB. Pt is overall doing well.         Vital Signs Last 24 Hrs  T(C): 36.1 (02 Jul 2020 21:27), Max: 36.9 (02 Jul 2020 08:47)  T(F): 97 (02 Jul 2020 21:27), Max: 98.5 (02 Jul 2020 08:47)  HR: 80 (02 Jul 2020 21:27) (60 - 90)  BP: 150/75 (02 Jul 2020 21:27) (118/57 - 159/66)  BP(mean): 84 (02 Jul 2020 08:47) (84 - 84)  RR: 18 (02 Jul 2020 21:27) (16 - 18)  SpO2: 97% (02 Jul 2020 21:27) (97% - 99%)      PHYSICAL EXAM:  Constitutional: Patient well nourish. well developed.  Neuro: AAOx3  Gastrointestinal: Abdomen soft, non distended, non tenderness  Wound: C/D/I  Extremities:  No edema, no calf tenderrness,            I&O's Summary    02 Jul 2020 07:01  -  03 Jul 2020 07:00  --------------------------------------------------------  IN: 0 mL / OUT: 300 mL / NET: -300 mL      I&O's Detail    02 Jul 2020 07:01  -  03 Jul 2020 07:00  --------------------------------------------------------  IN:  Total IN: 0 mL    OUT:    Voided: 300 mL  Total OUT: 300 mL    Total NET: -300 mL          MEDICATIONS  (STANDING):  brimonidine 0.2% Ophthalmic Solution 1 Drop(s) Both EYES two times a day  enoxaparin Injectable 40 milliGRAM(s) SubCutaneous daily  lactated ringers Bolus 500 milliLiter(s) IV Bolus once  latanoprost 0.005% Ophthalmic Solution 1 Drop(s) Both EYES at bedtime    MEDICATIONS  (PRN):      LABS:                        12.7   15.83 )-----------( 263      ( 02 Jul 2020 02:05 )             37.7     07-02    137  |  106  |  22  ----------------------------<  115<H>  4.0   |  16<L>  |  1.35<H>    Ca    8.9      02 Jul 2020 02:05  Phos  3.9     07-02  Mg     1.6     07-02    TPro  6.2  /  Alb  3.3  /  TBili  0.3  /  DBili  x   /  AST  19  /  ALT  19  /  AlkPhos  111  07-01    PT/INR - ( 01 Jul 2020 16:05 )   PT: 14.0 SEC;   INR: 1.21          PTT - ( 01 Jul 2020 16:05 )  PTT:35.5 SEC

## 2020-07-03 NOTE — DISCHARGE NOTE NURSING/CASE MANAGEMENT/SOCIAL WORK - PATIENT PORTAL LINK FT
You can access the FollowMyHealth Patient Portal offered by [facility] by registering at the following website: [email or web address]. By joining [employer]’s FollowMyHealth portal, you will also be able to view your health information using other applications (apps) compatible with our system.

## 2020-07-03 NOTE — CHART NOTE - NSCHARTNOTEFT_GEN_A_CORE
Cards Fellow Brief Note    Informed by surgery team that pt had outpt pharm NST on 6/17/20. Results were totally normal. Likely no need to repeat pharm NST now. C/w ASA/statin. Pt should have close outpt cards f/u, and discuss use of a/c for AF w/ outpt cardiologist once cleared from gen sx perspective. Suspect mild elev in hs-cTn related to stress from surgery in context of CKD.    No barriers to d/c from cards perspective. D/w attg Dr. Levine.    Alejandro Parrish MD  Cardiology Fellow  [phone]  All Cardiology service information can be found 24/7 on [email or web address], password: [email or web address] Cards Fellow Brief Note    Informed by surgery team that pt had outpt pharm NST on 6/17/20. Results were totally normal. Likely no need to repeat pharm NST now. C/w ASA/statin. Pt should have close outpt cards f/u, and discuss use of a/c for chronic AF w/ outpt cardiologist once cleared from gen sx perspective. Suspect mild elev in hs-cTn related to stress from surgery in context of CKD.    No barriers to d/c from cards perspective. D/w attg Dr. Levine.    Alejandro Parrish MD  Cardiology Fellow  [phone]  All Cardiology service information can be found 24/7 on [email or web address], password: [email or web address]

## 2020-07-06 RX ORDER — ASPIRIN/CALCIUM CARB/MAGNESIUM 324 MG
1 TABLET ORAL
Qty: 30 | Refills: 0
Start: 2020-07-06 | End: 2020-08-04

## 2020-07-06 RX ORDER — ATORVASTATIN CALCIUM 80 MG/1
1 TABLET, FILM COATED ORAL
Qty: 30 | Refills: 0
Start: 2020-07-06 | End: 2020-08-04

## 2020-07-09 LAB — SURGICAL PATHOLOGY STUDY: SIGNIFICANT CHANGE UP

## 2020-07-12 ENCOUNTER — TRANSCRIPTION ENCOUNTER (OUTPATIENT)
Age: 85
End: 2020-07-12

## 2021-01-19 ENCOUNTER — INPATIENT (INPATIENT)
Facility: HOSPITAL | Age: 86
LOS: 9 days | Discharge: NURSING, REHAB OR LONG-TERM CARE | DRG: 871 | End: 2021-01-29
Attending: INTERNAL MEDICINE | Admitting: INTERNAL MEDICINE
Payer: MEDICARE

## 2021-01-19 VITALS
HEIGHT: 62.5 IN | OXYGEN SATURATION: 93 % | TEMPERATURE: 97 F | DIASTOLIC BLOOD PRESSURE: 64 MMHG | HEART RATE: 59 BPM | WEIGHT: 145.06 LBS | SYSTOLIC BLOOD PRESSURE: 125 MMHG | RESPIRATION RATE: 15 BRPM

## 2021-01-19 DIAGNOSIS — Z98.890 OTHER SPECIFIED POSTPROCEDURAL STATES: Chronic | ICD-10-CM

## 2021-01-19 DIAGNOSIS — J18.9 PNEUMONIA, UNSPECIFIED ORGANISM: ICD-10-CM

## 2021-01-19 PROBLEM — K40.90 UNILATERAL INGUINAL HERNIA, WITHOUT OBSTRUCTION OR GANGRENE, NOT SPECIFIED AS RECURRENT: Chronic | Status: ACTIVE | Noted: 2020-06-23

## 2021-01-19 PROBLEM — I48.91 UNSPECIFIED ATRIAL FIBRILLATION: Chronic | Status: ACTIVE | Noted: 2020-06-23

## 2021-01-19 PROBLEM — H35.30 UNSPECIFIED MACULAR DEGENERATION: Chronic | Status: ACTIVE | Noted: 2020-06-23

## 2021-01-19 PROBLEM — H40.9 UNSPECIFIED GLAUCOMA: Chronic | Status: ACTIVE | Noted: 2020-06-23

## 2021-01-19 PROBLEM — Z87.438 PERSONAL HISTORY OF OTHER DISEASES OF MALE GENITAL ORGANS: Chronic | Status: ACTIVE | Noted: 2020-06-23

## 2021-01-19 LAB
ADD ON TEST-SPECIMEN IN LAB: SIGNIFICANT CHANGE UP
ALBUMIN SERPL ELPH-MCNC: 1.8 G/DL — LOW (ref 3.3–5)
ALP SERPL-CCNC: 266 U/L — HIGH (ref 40–120)
ALT FLD-CCNC: 45 U/L — SIGNIFICANT CHANGE UP (ref 12–78)
ANION GAP SERPL CALC-SCNC: 9 MMOL/L — SIGNIFICANT CHANGE UP (ref 5–17)
APPEARANCE UR: ABNORMAL
APTT BLD: 38.2 SEC — HIGH (ref 27.5–35.5)
AST SERPL-CCNC: 63 U/L — HIGH (ref 15–37)
BASOPHILS # BLD AUTO: 0.05 K/UL — SIGNIFICANT CHANGE UP (ref 0–0.2)
BASOPHILS NFR BLD AUTO: 0.1 % — SIGNIFICANT CHANGE UP (ref 0–2)
BILIRUB SERPL-MCNC: 2.1 MG/DL — HIGH (ref 0.2–1.2)
BILIRUB UR-MCNC: NEGATIVE — SIGNIFICANT CHANGE UP
BUN SERPL-MCNC: 145 MG/DL — HIGH (ref 7–23)
CALCIUM SERPL-MCNC: 11.7 MG/DL — HIGH (ref 8.5–10.1)
CHLORIDE SERPL-SCNC: 118 MMOL/L — HIGH (ref 96–108)
CO2 SERPL-SCNC: 19 MMOL/L — LOW (ref 22–31)
COLOR SPEC: ABNORMAL
CREAT SERPL-MCNC: 4.69 MG/DL — HIGH (ref 0.5–1.3)
DIFF PNL FLD: ABNORMAL
EOSINOPHIL # BLD AUTO: 0.03 K/UL — SIGNIFICANT CHANGE UP (ref 0–0.5)
EOSINOPHIL NFR BLD AUTO: 0.1 % — SIGNIFICANT CHANGE UP (ref 0–6)
GLUCOSE SERPL-MCNC: 112 MG/DL — HIGH (ref 70–99)
GLUCOSE UR QL: NEGATIVE MG/DL — SIGNIFICANT CHANGE UP
HCT VFR BLD CALC: 43.3 % — SIGNIFICANT CHANGE UP (ref 39–50)
HGB BLD-MCNC: 13.7 G/DL — SIGNIFICANT CHANGE UP (ref 13–17)
IMM GRANULOCYTES NFR BLD AUTO: 1 % — SIGNIFICANT CHANGE UP (ref 0–1.5)
INR BLD: 1.37 RATIO — HIGH (ref 0.88–1.16)
KETONES UR-MCNC: NEGATIVE — SIGNIFICANT CHANGE UP
LACTATE SERPL-SCNC: 1.9 MMOL/L — SIGNIFICANT CHANGE UP (ref 0.7–2)
LEUKOCYTE ESTERASE UR-ACNC: ABNORMAL
LYMPHOCYTES # BLD AUTO: 0.91 K/UL — LOW (ref 1–3.3)
LYMPHOCYTES # BLD AUTO: 2.7 % — LOW (ref 13–44)
MCHC RBC-ENTMCNC: 27.5 PG — SIGNIFICANT CHANGE UP (ref 27–34)
MCHC RBC-ENTMCNC: 31.6 GM/DL — LOW (ref 32–36)
MCV RBC AUTO: 86.8 FL — SIGNIFICANT CHANGE UP (ref 80–100)
MONOCYTES # BLD AUTO: 1.97 K/UL — HIGH (ref 0–0.9)
MONOCYTES NFR BLD AUTO: 5.8 % — SIGNIFICANT CHANGE UP (ref 2–14)
NEUTROPHILS # BLD AUTO: 30.74 K/UL — HIGH (ref 1.8–7.4)
NEUTROPHILS NFR BLD AUTO: 90.3 % — HIGH (ref 43–77)
NITRITE UR-MCNC: NEGATIVE — SIGNIFICANT CHANGE UP
PH UR: 5 — SIGNIFICANT CHANGE UP (ref 5–8)
PLATELET # BLD AUTO: 292 K/UL — SIGNIFICANT CHANGE UP (ref 150–400)
POTASSIUM SERPL-MCNC: 5.2 MMOL/L — SIGNIFICANT CHANGE UP (ref 3.5–5.3)
POTASSIUM SERPL-SCNC: 5.2 MMOL/L — SIGNIFICANT CHANGE UP (ref 3.5–5.3)
PROT SERPL-MCNC: 6.9 GM/DL — SIGNIFICANT CHANGE UP (ref 6–8.3)
PROT UR-MCNC: 100 MG/DL
PROTHROM AB SERPL-ACNC: 15.8 SEC — HIGH (ref 10.6–13.6)
RBC # BLD: 4.99 M/UL — SIGNIFICANT CHANGE UP (ref 4.2–5.8)
RBC # FLD: 16.8 % — HIGH (ref 10.3–14.5)
SARS-COV-2 RNA SPEC QL NAA+PROBE: SIGNIFICANT CHANGE UP
SODIUM SERPL-SCNC: 146 MMOL/L — HIGH (ref 135–145)
SP GR SPEC: 1.02 — SIGNIFICANT CHANGE UP (ref 1.01–1.02)
UROBILINOGEN FLD QL: NEGATIVE MG/DL — SIGNIFICANT CHANGE UP
WBC # BLD: 34.03 K/UL — HIGH (ref 3.8–10.5)
WBC # FLD AUTO: 34.03 K/UL — HIGH (ref 3.8–10.5)

## 2021-01-19 PROCEDURE — 85027 COMPLETE CBC AUTOMATED: CPT

## 2021-01-19 PROCEDURE — 83735 ASSAY OF MAGNESIUM: CPT

## 2021-01-19 PROCEDURE — 36415 COLL VENOUS BLD VENIPUNCTURE: CPT

## 2021-01-19 PROCEDURE — 80048 BASIC METABOLIC PNL TOTAL CA: CPT

## 2021-01-19 PROCEDURE — 86334 IMMUNOFIX E-PHORESIS SERUM: CPT

## 2021-01-19 PROCEDURE — 84300 ASSAY OF URINE SODIUM: CPT

## 2021-01-19 PROCEDURE — 82570 ASSAY OF URINE CREATININE: CPT

## 2021-01-19 PROCEDURE — 76770 US EXAM ABDO BACK WALL COMP: CPT

## 2021-01-19 PROCEDURE — 84100 ASSAY OF PHOSPHORUS: CPT

## 2021-01-19 PROCEDURE — 83605 ASSAY OF LACTIC ACID: CPT

## 2021-01-19 PROCEDURE — 70450 CT HEAD/BRAIN W/O DYE: CPT | Mod: 26

## 2021-01-19 PROCEDURE — 71045 X-RAY EXAM CHEST 1 VIEW: CPT | Mod: 26

## 2021-01-19 PROCEDURE — 93010 ELECTROCARDIOGRAM REPORT: CPT

## 2021-01-19 PROCEDURE — 97162 PT EVAL MOD COMPLEX 30 MIN: CPT | Mod: GP

## 2021-01-19 PROCEDURE — 83519 RIA NONANTIBODY: CPT

## 2021-01-19 PROCEDURE — 80069 RENAL FUNCTION PANEL: CPT

## 2021-01-19 PROCEDURE — 95816 EEG AWAKE AND DROWSY: CPT

## 2021-01-19 PROCEDURE — 83883 ASSAY NEPHELOMETRY NOT SPEC: CPT

## 2021-01-19 PROCEDURE — 70450 CT HEAD/BRAIN W/O DYE: CPT

## 2021-01-19 PROCEDURE — 83935 ASSAY OF URINE OSMOLALITY: CPT

## 2021-01-19 PROCEDURE — 92610 EVALUATE SWALLOWING FUNCTION: CPT | Mod: GN

## 2021-01-19 PROCEDURE — 92507 TX SP LANG VOICE COMM INDIV: CPT | Mod: GN

## 2021-01-19 PROCEDURE — 85025 COMPLETE CBC W/AUTO DIFF WBC: CPT

## 2021-01-19 PROCEDURE — U0003: CPT

## 2021-01-19 PROCEDURE — 87040 BLOOD CULTURE FOR BACTERIA: CPT

## 2021-01-19 PROCEDURE — 84165 PROTEIN E-PHORESIS SERUM: CPT

## 2021-01-19 PROCEDURE — 70551 MRI BRAIN STEM W/O DYE: CPT

## 2021-01-19 PROCEDURE — 92526 ORAL FUNCTION THERAPY: CPT | Mod: GN

## 2021-01-19 PROCEDURE — 80053 COMPREHEN METABOLIC PANEL: CPT

## 2021-01-19 PROCEDURE — 92523 SPEECH SOUND LANG COMPREHEN: CPT | Mod: GN

## 2021-01-19 PROCEDURE — 99222 1ST HOSP IP/OBS MODERATE 55: CPT

## 2021-01-19 PROCEDURE — 84155 ASSAY OF PROTEIN SERUM: CPT

## 2021-01-19 PROCEDURE — U0005: CPT

## 2021-01-19 PROCEDURE — 82306 VITAMIN D 25 HYDROXY: CPT

## 2021-01-19 PROCEDURE — 97530 THERAPEUTIC ACTIVITIES: CPT | Mod: GP

## 2021-01-19 RX ORDER — LATANOPROST 0.05 MG/ML
1 SOLUTION/ DROPS OPHTHALMIC; TOPICAL AT BEDTIME
Refills: 0 | Status: DISCONTINUED | OUTPATIENT
Start: 2021-01-19 | End: 2021-01-29

## 2021-01-19 RX ORDER — LISINOPRIL 2.5 MG/1
1 TABLET ORAL
Qty: 0 | Refills: 0 | DISCHARGE

## 2021-01-19 RX ORDER — ASPIRIN/CALCIUM CARB/MAGNESIUM 324 MG
81 TABLET ORAL DAILY
Refills: 0 | Status: DISCONTINUED | OUTPATIENT
Start: 2021-01-19 | End: 2021-01-29

## 2021-01-19 RX ORDER — SODIUM CHLORIDE 9 MG/ML
1000 INJECTION INTRAMUSCULAR; INTRAVENOUS; SUBCUTANEOUS
Refills: 0 | Status: DISCONTINUED | OUTPATIENT
Start: 2021-01-19 | End: 2021-01-20

## 2021-01-19 RX ORDER — CEFEPIME 1 G/1
1000 INJECTION, POWDER, FOR SOLUTION INTRAMUSCULAR; INTRAVENOUS ONCE
Refills: 0 | Status: COMPLETED | OUTPATIENT
Start: 2021-01-19 | End: 2021-01-19

## 2021-01-19 RX ORDER — VANCOMYCIN HCL 1 G
1000 VIAL (EA) INTRAVENOUS ONCE
Refills: 0 | Status: COMPLETED | OUTPATIENT
Start: 2021-01-19 | End: 2021-01-19

## 2021-01-19 RX ORDER — ONDANSETRON 8 MG/1
4 TABLET, FILM COATED ORAL EVERY 6 HOURS
Refills: 0 | Status: DISCONTINUED | OUTPATIENT
Start: 2021-01-19 | End: 2021-01-29

## 2021-01-19 RX ORDER — ACETAMINOPHEN 500 MG
1 TABLET ORAL
Qty: 0 | Refills: 0 | DISCHARGE

## 2021-01-19 RX ORDER — HYDROMORPHONE HYDROCHLORIDE 2 MG/ML
0.5 INJECTION INTRAMUSCULAR; INTRAVENOUS; SUBCUTANEOUS EVERY 4 HOURS
Refills: 0 | Status: DISCONTINUED | OUTPATIENT
Start: 2021-01-19 | End: 2021-01-20

## 2021-01-19 RX ORDER — BRIMONIDINE TARTRATE 2 MG/MG
1 SOLUTION/ DROPS OPHTHALMIC
Qty: 0 | Refills: 0 | DISCHARGE

## 2021-01-19 RX ORDER — ACETAMINOPHEN 500 MG
650 TABLET ORAL EVERY 6 HOURS
Refills: 0 | Status: DISCONTINUED | OUTPATIENT
Start: 2021-01-19 | End: 2021-01-29

## 2021-01-19 RX ORDER — LISINOPRIL 2.5 MG/1
10 TABLET ORAL DAILY
Refills: 0 | Status: DISCONTINUED | OUTPATIENT
Start: 2021-01-19 | End: 2021-01-20

## 2021-01-19 RX ORDER — HEPARIN SODIUM 5000 [USP'U]/ML
5000 INJECTION INTRAVENOUS; SUBCUTANEOUS EVERY 12 HOURS
Refills: 0 | Status: DISCONTINUED | OUTPATIENT
Start: 2021-01-19 | End: 2021-01-29

## 2021-01-19 RX ORDER — CEFEPIME 1 G/1
1000 INJECTION, POWDER, FOR SOLUTION INTRAMUSCULAR; INTRAVENOUS ONCE
Refills: 0 | Status: DISCONTINUED | OUTPATIENT
Start: 2021-01-19 | End: 2021-01-19

## 2021-01-19 RX ORDER — CEFEPIME 1 G/1
1000 INJECTION, POWDER, FOR SOLUTION INTRAMUSCULAR; INTRAVENOUS EVERY 12 HOURS
Refills: 0 | Status: DISCONTINUED | OUTPATIENT
Start: 2021-01-19 | End: 2021-01-28

## 2021-01-19 RX ORDER — DORZOLAMIDE HYDROCHLORIDE TIMOLOL MALEATE 20; 5 MG/ML; MG/ML
1 SOLUTION/ DROPS OPHTHALMIC
Refills: 0 | Status: DISCONTINUED | OUTPATIENT
Start: 2021-01-19 | End: 2021-01-29

## 2021-01-19 RX ORDER — IBUPROFEN 200 MG
1 TABLET ORAL
Qty: 0 | Refills: 0 | DISCHARGE

## 2021-01-19 RX ORDER — CEFEPIME 1 G/1
1000 INJECTION, POWDER, FOR SOLUTION INTRAMUSCULAR; INTRAVENOUS EVERY 12 HOURS
Refills: 0 | Status: DISCONTINUED | OUTPATIENT
Start: 2021-01-19 | End: 2021-01-19

## 2021-01-19 RX ORDER — SODIUM CHLORIDE 9 MG/ML
1000 INJECTION INTRAMUSCULAR; INTRAVENOUS; SUBCUTANEOUS ONCE
Refills: 0 | Status: COMPLETED | OUTPATIENT
Start: 2021-01-19 | End: 2021-01-19

## 2021-01-19 RX ORDER — IPRATROPIUM/ALBUTEROL SULFATE 18-103MCG
3 AEROSOL WITH ADAPTER (GRAM) INHALATION
Qty: 0 | Refills: 0 | DISCHARGE

## 2021-01-19 RX ADMIN — DORZOLAMIDE HYDROCHLORIDE TIMOLOL MALEATE 1 DROP(S): 20; 5 SOLUTION/ DROPS OPHTHALMIC at 21:51

## 2021-01-19 RX ADMIN — LATANOPROST 1 DROP(S): 0.05 SOLUTION/ DROPS OPHTHALMIC; TOPICAL at 21:51

## 2021-01-19 RX ADMIN — Medication 250 MILLIGRAM(S): at 15:48

## 2021-01-19 RX ADMIN — SODIUM CHLORIDE 1000 MILLILITER(S): 9 INJECTION INTRAMUSCULAR; INTRAVENOUS; SUBCUTANEOUS at 14:26

## 2021-01-19 RX ADMIN — SODIUM CHLORIDE 80 MILLILITER(S): 9 INJECTION INTRAMUSCULAR; INTRAVENOUS; SUBCUTANEOUS at 21:21

## 2021-01-19 RX ADMIN — CEFEPIME 1000 MILLIGRAM(S): 1 INJECTION, POWDER, FOR SOLUTION INTRAMUSCULAR; INTRAVENOUS at 15:48

## 2021-01-19 RX ADMIN — HEPARIN SODIUM 5000 UNIT(S): 5000 INJECTION INTRAVENOUS; SUBCUTANEOUS at 21:17

## 2021-01-19 NOTE — H&P ADULT - ASSESSMENT
* Toxic metabolic encephalopathy sec to HCAP  Cefepime  prn O2    * h/o AF   * Toxic metabolic encephalopathy sec to HCAP  Cefepime  supplemental O2  keep NPO for now pt too lethargic to attempt feeds  IVF  NPO for now    * h/o AF not AC at NH    * might need swallow eval; consult Pall re: return to hospital s hospice care at NH    * Sinus angeles  DNR DNI    daughter Ayesha [phone]  I will add prn low dose Morphine pt seems uncomfortable   At baseline he doesn't have dementia and was able to communicate with daughter Ayesha; he has severe [place]; per daughter his PO intake decreased dramatically the past month. She is aware Pall will be consultated

## 2021-01-19 NOTE — ED PROVIDER NOTE - NS PRO AD PATIENT TYPE
DNI, No central line./Health Care Proxy (HCP)/Do Not Resuscitate (DNR)/Medical Orders for Life-Sustaining Treatment (MOLST)

## 2021-01-19 NOTE — ED PROVIDER NOTE - PMH
Atrial fibrillation    Glaucoma    History of BPH    Inguinal hernia  left  Macular degeneration

## 2021-01-19 NOTE — H&P ADULT - NSICDXPASTMEDICALHX_GEN_ALL_CORE_FT
PAST MEDICAL HISTORY:  Atrial fibrillation     Glaucoma     History of BPH     Inguinal hernia left    Macular degeneration

## 2021-01-19 NOTE — PHARMACOTHERAPY INTERVENTION NOTE - COMMENTS
Confirmed home medications with patient's list from [facility].

## 2021-01-19 NOTE — CHART NOTE - NSCHARTNOTEFT_GEN_A_CORE
Labs resulted: Cr 4.6; he was straight cath for ua ( 250ml dark urine); Ca 11.2  This prerenal CRISTELA and he will receive IVF  prn dilaudid for pain  daughter aware

## 2021-01-19 NOTE — ED PROVIDER NOTE - CLINICAL SUMMARY MEDICAL DECISION MAKING FREE TEXT BOX
89 M with hx of BPH, Afib presenting to the ED from rehab with AMS, SOB, has not been eating. Will get blood work, CT head, monitor closely. Concern for stroke as patient is only on ASA, less likely infectious source.

## 2021-01-19 NOTE — H&P ADULT - NSHPPHYSICALEXAM_GEN_ALL_CORE
Vital Signs Last 24 Hrs  T(C): 36.6 (19 Jan 2021 15:27), Max: 36.6 (19 Jan 2021 15:27)  T(F): 97.9 (19 Jan 2021 15:27), Max: 97.9 (19 Jan 2021 15:27)  HR: 51 (19 Jan 2021 15:27) (51 - 59)  BP: 123/51 (19 Jan 2021 15:27) (123/51 - 125/64)  BP(mean): 70 (19 Jan 2021 15:27) (70 - 70)  RR: 17 (19 Jan 2021 15:27) (15 - 17)  SpO2: 98% (19 Jan 2021 15:27) (93% - 98%)      · CONSTITUTIONAL: Elderly M laying in bed. Awake, mumbling, in no acute distress.  · ENMT: Airway patent, Nasal mucosa clear. Mouth with normal mucosa. Throat has no vesicles, no oropharyngeal exudates and uvula is midline.  · EYES: Clear bilaterally, pupils equal, round and reactive to light.  · CARDIAC: Normal rate, regular rhythm.  Heart sounds S1, S2.  No murmurs, rubs or gallops.  · RESPIRATORY: Breath sounds clear and equal bilaterally.  · GASTROINTESTINAL: Abdomen soft, non-tender, no guarding.  · MUSCULOSKELETAL: Spine appears normal, range of motion is not limited, no muscle or joint tenderness  · NEUROLOGICAL: Awake, moving extremities but not following any commands.  · SKIN: Skin normal color for race, warm, dry and intact. No evidence of rash. Vital Signs Last 24 Hrs  T(C): 36.6 (19 Jan 2021 15:27), Max: 36.6 (19 Jan 2021 15:27)  T(F): 97.9 (19 Jan 2021 15:27), Max: 97.9 (19 Jan 2021 15:27)  HR: 51 (19 Jan 2021 15:27) (51 - 59)  BP: 123/51 (19 Jan 2021 15:27) (123/51 - 125/64)  BP(mean): 70 (19 Jan 2021 15:27) (70 - 70)  RR: 17 (19 Jan 2021 15:27) (15 - 17)  SpO2: 98% (19 Jan 2021 15:27) (93% - 98%)      · CONSTITUTIONAL: Elderly M laying in bed. Awake, mumbling, seems anxious or uncomfortable   · ENMT: Airway patent, Nasal mucosa clear. dry oral muc, edentulous   · EYES: Clear bilaterally, pupils equal, round and reactive to light.  · CARDIAC: Normal rate, regular rhythm.  Heart sounds S1, S2.  No murmurs, rubs or gallops.  · RESPIRATORY: Breath sounds clear and equal bilaterally.  · GASTROINTESTINAL: Abdomen soft, non-tender, no guarding.  · MUSCULOSKELETAL: Spine appears normal, range of motion is not limited, no muscle or joint tenderness  · NEUROLOGICAL: Awake, moving extremities but not following any commands.  · SKIN: Skin normal color for race, warm, dry and intact. No evidence of rash.

## 2021-01-19 NOTE — H&P ADULT - HISTORY OF PRESENT ILLNESS
89 M with PMH of Afib, not currently on AC's, BPH,  post hospitalization s/p NSTEMI d/c to subacute rehab presenting to the ED from rehab with AMS, SOB. Per rehab, patient was found today in bed mumbling, not making sense with associated SOB. Sent patient to the ED for further evaluation. Pt is on baby ASA. Unable to obtain full HPI secondary to patients AMS. Admit for HCAP

## 2021-01-19 NOTE — ED PROVIDER NOTE - OBJECTIVE STATEMENT
89 M with PMH of Afib, not currently on AC's, BPH,  post hospitalization s/p NSTEMI d/c to subacute rehab presenting to the ED from rehab with AMS, SOB. Per rebab, patient was found today in bed mumbling, not making sense with associated SOB. Sent patient to the ED for further evaluation. Pt is on baby ASA. Unable to obtain full HPI secondary to patients AMS.

## 2021-01-20 LAB
ANION GAP SERPL CALC-SCNC: 11 MMOL/L — SIGNIFICANT CHANGE UP (ref 5–17)
ANION GAP SERPL CALC-SCNC: 8 MMOL/L — SIGNIFICANT CHANGE UP (ref 5–17)
BASOPHILS # BLD AUTO: 0.03 K/UL — SIGNIFICANT CHANGE UP (ref 0–0.2)
BASOPHILS NFR BLD AUTO: 0.1 % — SIGNIFICANT CHANGE UP (ref 0–2)
BUN SERPL-MCNC: 148 MG/DL — HIGH (ref 7–23)
BUN SERPL-MCNC: 149 MG/DL — HIGH (ref 7–23)
CALCIUM SERPL-MCNC: 10.7 MG/DL — HIGH (ref 8.5–10.1)
CALCIUM SERPL-MCNC: 11.1 MG/DL — HIGH (ref 8.5–10.1)
CHLORIDE SERPL-SCNC: 122 MMOL/L — HIGH (ref 96–108)
CHLORIDE SERPL-SCNC: 123 MMOL/L — HIGH (ref 96–108)
CO2 SERPL-SCNC: 16 MMOL/L — LOW (ref 22–31)
CO2 SERPL-SCNC: 19 MMOL/L — LOW (ref 22–31)
CREAT SERPL-MCNC: 4.16 MG/DL — HIGH (ref 0.5–1.3)
CREAT SERPL-MCNC: 4.17 MG/DL — HIGH (ref 0.5–1.3)
CULTURE RESULTS: NO GROWTH — SIGNIFICANT CHANGE UP
EOSINOPHIL # BLD AUTO: 0.12 K/UL — SIGNIFICANT CHANGE UP (ref 0–0.5)
EOSINOPHIL NFR BLD AUTO: 0.5 % — SIGNIFICANT CHANGE UP (ref 0–6)
GLUCOSE SERPL-MCNC: 71 MG/DL — SIGNIFICANT CHANGE UP (ref 70–99)
GLUCOSE SERPL-MCNC: 76 MG/DL — SIGNIFICANT CHANGE UP (ref 70–99)
HCT VFR BLD CALC: 38.3 % — LOW (ref 39–50)
HCT VFR BLD CALC: 43.2 % — SIGNIFICANT CHANGE UP (ref 39–50)
HGB BLD-MCNC: 12.4 G/DL — LOW (ref 13–17)
HGB BLD-MCNC: 13.8 G/DL — SIGNIFICANT CHANGE UP (ref 13–17)
IMM GRANULOCYTES NFR BLD AUTO: 0.6 % — SIGNIFICANT CHANGE UP (ref 0–1.5)
LYMPHOCYTES # BLD AUTO: 0.76 K/UL — LOW (ref 1–3.3)
LYMPHOCYTES # BLD AUTO: 3.3 % — LOW (ref 13–44)
MAGNESIUM SERPL-MCNC: 2.8 MG/DL — HIGH (ref 1.6–2.6)
MCHC RBC-ENTMCNC: 27.4 PG — SIGNIFICANT CHANGE UP (ref 27–34)
MCHC RBC-ENTMCNC: 27.5 PG — SIGNIFICANT CHANGE UP (ref 27–34)
MCHC RBC-ENTMCNC: 31.9 GM/DL — LOW (ref 32–36)
MCHC RBC-ENTMCNC: 32.4 GM/DL — SIGNIFICANT CHANGE UP (ref 32–36)
MCV RBC AUTO: 84.9 FL — SIGNIFICANT CHANGE UP (ref 80–100)
MCV RBC AUTO: 85.9 FL — SIGNIFICANT CHANGE UP (ref 80–100)
MONOCYTES # BLD AUTO: 1.45 K/UL — HIGH (ref 0–0.9)
MONOCYTES NFR BLD AUTO: 6.3 % — SIGNIFICANT CHANGE UP (ref 2–14)
NEUTROPHILS # BLD AUTO: 20.61 K/UL — HIGH (ref 1.8–7.4)
NEUTROPHILS NFR BLD AUTO: 89.2 % — HIGH (ref 43–77)
PHOSPHATE SERPL-MCNC: 5.5 MG/DL — HIGH (ref 2.5–4.5)
PLATELET # BLD AUTO: 283 K/UL — SIGNIFICANT CHANGE UP (ref 150–400)
PLATELET # BLD AUTO: 292 K/UL — SIGNIFICANT CHANGE UP (ref 150–400)
POTASSIUM SERPL-MCNC: 4.5 MMOL/L — SIGNIFICANT CHANGE UP (ref 3.5–5.3)
POTASSIUM SERPL-MCNC: 4.8 MMOL/L — SIGNIFICANT CHANGE UP (ref 3.5–5.3)
POTASSIUM SERPL-SCNC: 4.5 MMOL/L — SIGNIFICANT CHANGE UP (ref 3.5–5.3)
POTASSIUM SERPL-SCNC: 4.8 MMOL/L — SIGNIFICANT CHANGE UP (ref 3.5–5.3)
RBC # BLD: 4.51 M/UL — SIGNIFICANT CHANGE UP (ref 4.2–5.8)
RBC # BLD: 5.03 M/UL — SIGNIFICANT CHANGE UP (ref 4.2–5.8)
RBC # FLD: 16.5 % — HIGH (ref 10.3–14.5)
RBC # FLD: 17.2 % — HIGH (ref 10.3–14.5)
SARS-COV-2 IGG SERPL QL IA: POSITIVE
SARS-COV-2 IGM SERPL IA-ACNC: 139 INDEX — HIGH
SODIUM SERPL-SCNC: 149 MMOL/L — HIGH (ref 135–145)
SODIUM SERPL-SCNC: 150 MMOL/L — HIGH (ref 135–145)
SPECIMEN SOURCE: SIGNIFICANT CHANGE UP
WBC # BLD: 23.11 K/UL — HIGH (ref 3.8–10.5)
WBC # BLD: 26.79 K/UL — HIGH (ref 3.8–10.5)
WBC # FLD AUTO: 23.11 K/UL — HIGH (ref 3.8–10.5)
WBC # FLD AUTO: 26.79 K/UL — HIGH (ref 3.8–10.5)

## 2021-01-20 PROCEDURE — 99233 SBSQ HOSP IP/OBS HIGH 50: CPT

## 2021-01-20 PROCEDURE — 99223 1ST HOSP IP/OBS HIGH 75: CPT

## 2021-01-20 PROCEDURE — 76770 US EXAM ABDO BACK WALL COMP: CPT | Mod: 26

## 2021-01-20 RX ORDER — SODIUM CHLORIDE 9 MG/ML
1000 INJECTION, SOLUTION INTRAVENOUS
Refills: 0 | Status: DISCONTINUED | OUTPATIENT
Start: 2021-01-20 | End: 2021-01-21

## 2021-01-20 RX ORDER — SODIUM CHLORIDE 9 MG/ML
1000 INJECTION, SOLUTION INTRAVENOUS
Refills: 0 | Status: DISCONTINUED | OUTPATIENT
Start: 2021-01-20 | End: 2021-01-25

## 2021-01-20 RX ADMIN — SODIUM CHLORIDE 80 MILLILITER(S): 9 INJECTION INTRAMUSCULAR; INTRAVENOUS; SUBCUTANEOUS at 09:39

## 2021-01-20 RX ADMIN — CEFEPIME 1000 MILLIGRAM(S): 1 INJECTION, POWDER, FOR SOLUTION INTRAMUSCULAR; INTRAVENOUS at 20:54

## 2021-01-20 RX ADMIN — LATANOPROST 1 DROP(S): 0.05 SOLUTION/ DROPS OPHTHALMIC; TOPICAL at 21:09

## 2021-01-20 RX ADMIN — HEPARIN SODIUM 5000 UNIT(S): 5000 INJECTION INTRAVENOUS; SUBCUTANEOUS at 16:16

## 2021-01-20 RX ADMIN — SODIUM CHLORIDE 75 MILLILITER(S): 9 INJECTION, SOLUTION INTRAVENOUS at 18:27

## 2021-01-20 RX ADMIN — Medication 81 MILLIGRAM(S): at 09:37

## 2021-01-20 RX ADMIN — CEFEPIME 1000 MILLIGRAM(S): 1 INJECTION, POWDER, FOR SOLUTION INTRAMUSCULAR; INTRAVENOUS at 09:37

## 2021-01-20 RX ADMIN — HEPARIN SODIUM 5000 UNIT(S): 5000 INJECTION INTRAVENOUS; SUBCUTANEOUS at 06:50

## 2021-01-20 RX ADMIN — DORZOLAMIDE HYDROCHLORIDE TIMOLOL MALEATE 1 DROP(S): 20; 5 SOLUTION/ DROPS OPHTHALMIC at 20:58

## 2021-01-20 RX ADMIN — DORZOLAMIDE HYDROCHLORIDE TIMOLOL MALEATE 1 DROP(S): 20; 5 SOLUTION/ DROPS OPHTHALMIC at 09:38

## 2021-01-20 RX ADMIN — LISINOPRIL 10 MILLIGRAM(S): 2.5 TABLET ORAL at 09:37

## 2021-01-20 NOTE — SWALLOW BEDSIDE ASSESSMENT ADULT - SWALLOW EVAL: SECRETION MANAGEMENT
Limited probes revealed that pt's volitional cough was moist and produced with mildly decreased strength.

## 2021-01-20 NOTE — DIETITIAN INITIAL EVALUATION ADULT. - PERTINENT MEDS FT
MEDICATIONS  (STANDING):  aspirin enteric coated 81 milliGRAM(s) Oral daily  cefepime  Injectable. 1000 milliGRAM(s) IV Push every 12 hours  dorzolamide 2%/timolol 0.5% Ophthalmic Solution 1 Drop(s) Both EYES two times a day  heparin   Injectable 5000 Unit(s) SubCutaneous every 12 hours  latanoprost 0.005% Ophthalmic Solution 1 Drop(s) Both EYES at bedtime  lisinopril 10 milliGRAM(s) Oral daily  sodium chloride 0.9%. 1000 milliLiter(s) (80 mL/Hr) IV Continuous <Continuous>    MEDICATIONS  (PRN):  acetaminophen   Tablet .. 650 milliGRAM(s) Oral every 6 hours PRN Temp greater or equal to 38.5C (101.3F), Mild Pain (1 - 3)  acetaminophen  Suppository .. 650 milliGRAM(s) Rectal every 6 hours PRN Temp greater or equal to 38.5C (101.3F)  aluminum hydroxide/magnesium hydroxide/simethicone Suspension 30 milliLiter(s) Oral every 4 hours PRN Dyspepsia  ondansetron Injectable 4 milliGRAM(s) IV Push every 6 hours PRN Nausea

## 2021-01-20 NOTE — SWALLOW BEDSIDE ASSESSMENT ADULT - ASR SWALLOW RECOMMEND DIAG
DEFER MBS AS PT APPEARED CLINICALLY TOLERANT OF SUGGESTED PO TEXTURES FROM AN OROPHARYNGEAL SWALLOWING STANCE, DYSPHAGIA WITH A PROPENSITY TO FLUCTUATE IN SEVERITY GIVEN HIS PROFILE AND CONSIDERING HIS ALTERED MENTATION.

## 2021-01-20 NOTE — DIETITIAN INITIAL EVALUATION ADULT. - ADD RECOMMEND
1. advance diet as medically feasible to pureed diet w/ thin liquids as per SLP recommendations 2. add ensure enlive TID and gelatein BID 3. provide maximum assistance w/ po intake and maintain aspiration precautions 4. monitor po intake and document in EMR 5. weekly wt 6. add MVI w/ minerals daily

## 2021-01-20 NOTE — DIETITIAN INITIAL EVALUATION ADULT. - ETIOLOGY
inadequate po intake 2/2 persistent lack of appetite, decreased desire to eat, difficulty feeding self, dysphagia

## 2021-01-20 NOTE — DIETITIAN INITIAL EVALUATION ADULT. - MALNUTRITION
Pt meets criteria for severe malnutrition in the context of acute illness AEB wt loss 17.3% BW x 2 months, PO intake < 50% ENN > 1 month, moderate muscle/fat wasting

## 2021-01-20 NOTE — SWALLOW BEDSIDE ASSESSMENT ADULT - SWALLOW EVAL: DIAGNOSIS
1) The pt demonstrates periodically reduced orientation to feeding atop Oropharyngeal Dysphagia which subjectively appeared to be a grossly functional condition with the above modified food textures without behavioral aspiration signs on exam. Dysphagia in setting of acute medical deconditioning(i.e pneumonia, bradycardia) and underlying Dementia.  2) Pt was awake but somewhat fatigued, distractible, inattentive and fidgety at times. He inconsistently followed 1 step verbal commands and inconsistently responsively verbalized during communicative probes. At these times, his motor speech abilities were grossly functional & his utterances were linguistically intact. However, his verbalizations were typically brief and variably contextually inappropriate consistent with Cognitive Dysfunction.(encephalopathy related to acute illness, Dementia). 1) The pt demonstrates periodically reduced orientation to feeding atop Oropharyngeal Dysphagia which subjectively appeared to be a grossly functional condition with the above modified food textures without behavioral aspiration signs on exam. Dysphagia in setting of acute medical deconditioning(i.e pneumonia, bradycardia, encephalopathy) and underlying Dementia.  2) Pt was awake but somewhat fatigued, distractible, inattentive and fidgety at times. He inconsistently followed 1 step verbal commands and inconsistently responsively verbalized during communicative probes. At these times, his motor speech abilities were grossly functional & his utterances were linguistically intact. However, his verbalizations were typically brief and variably contextually inappropriate consistent with Cognitive Dysfunction.(encephalopathy related to acute illness, Dementia). 1) The pt demonstrates periodically reduced orientation to feeding atop Oropharyngeal Dysphagia which subjectively appeared to be a grossly functional condition with the above modified food textures without behavioral aspiration signs on exam when in an alert state. Dysphagia in setting of acute medical deconditioning(i.e pneumonia, bradycardia, encephalopathy) and underlying reported Dementia.  2) Pt was awake but somewhat fatigued, distractible, inattentive and fidgety at times. He inconsistently followed 1 step verbal commands and inconsistently responsively verbalized during communicative probes. At these times, his motor speech abilities were grossly functional & his utterances were linguistically intact. However, his verbalizations were typically brief and variably contextually inappropriate consistent with Cognitive Dysfunction.(encephalopathy related to acute illness, Dementia).

## 2021-01-20 NOTE — SWALLOW BEDSIDE ASSESSMENT ADULT - COMMENTS
The pt was admitted to  from inpatient rehab with lethargy and altered mentation. Hospital course is notable for encephalopathy, pneumonia and bradycardia. This profile is superimposed upon a history of advanced Dementia, CAD s/p MI, A-Fib, macular degeneration, left inguinal hernia, BPH status post surgery, and past right leg fracture s/p ORIF. The pt was admitted to  from inpatient rehab/[facility] with lethargy and altered mentation. Hospital course is notable for encephalopathy, pneumonia and bradycardia. This profile is superimposed upon a history of advanced Dementia, Oropharyngeal Dysphagia, CAD s/p MI, A-Fib, macular degeneration, left inguinal hernia, BPH status post surgery, previous shingles and past right leg fracture s/p ORIF. The pt was admitted to  from inpatient rehab/[facility] with lethargy and altered mentation. Hospital course is notable for encephalopathy, pneumonia and bradycardia. This profile is superimposed upon a history of Dementia, Oropharyngeal Dysphagia, CAD s/p MI, A-Fib, macular degeneration, left inguinal hernia, BPH status post surgery, previous shingles and past right leg fracture s/p ORIF.

## 2021-01-20 NOTE — DIETITIAN INITIAL EVALUATION ADULT. - ORAL INTAKE PTA/DIET HISTORY
Pt is a poor historian and unable to provide any hx. Contacted pts daughter who reports pt w/ poor po intake x 2 months likely meeting < 50% his normal po intake. During this time daughter reports that pt was consuming cold cereal and nutrition supplements like boost or premier protein shake however shakes had to be encouraged. Contacted [facility] and spoke w/ nurse manager who reports pt was w/ poor po intake during admission (at [facility] x 1 week PTA) and pt required total assistance w/ meals.

## 2021-01-20 NOTE — DIETITIAN INITIAL EVALUATION ADULT. - PERTINENT LABORATORY DATA
01-20 Na150 mmol/L<H> Glu 76 mg/dL K+ 4.8 mmol/L Cr  4.16 mg/dL<H>  mg/dL<H> Phos 5.5 mg/dL<H> Alb n/a   PAB n/a    *noted hypernatremia, likely r/t poor po intake.

## 2021-01-20 NOTE — PROGRESS NOTE ADULT - SUBJECTIVE AND OBJECTIVE BOX
SUBJECTIVE: Limited ROS/CC as patient is AMS.     FROM H&P:    "89 M with PMH of Afib, not currently on AC's, BPH,  post hospitalization s/p NSTEMI d/c to subacute rehab presenting to the ED from rehab with AMS, SOB. Per rehab, patient was found today in bed mumbling, not making sense with associated SOB. Sent patient to the ED for further evaluation. Pt is on baby ASA. Unable to obtain full HPI secondary to patients AMS. Admit for HCAP."        PHYSICAL EXAM:    T(C): 36.5 (01-20-21 @ 09:07), Max: 36.5 (01-20-21 @ 09:07)  HR: 56 (01-20-21 @ 09:54) (47 - 57)  BP: 139/58 (01-20-21 @ 09:07) (108/85 - 139/58)  RR: 18 (01-20-21 @ 09:07) (16 - 18)  SpO2: 95% (01-20-21 @ 09:07) (95% - 100%)    General: AAOx0; NAD; lethargic  Head: AT/NC  ENT: Dry mucous membranes No Injury  Neck: Non-tender; No JVD  CVS: RRR, S1&S2, No murmur, b/L LE Edema  Respiratory: Decreased bibasilar breath sounds; Normal Respiratory Effort  Abdomen/GI: Soft, non-tender, non-distended, no guarding, no rebound, normal bowel sounds  : No bladder distention, No Simons  Extremites: No cyanosis, No clubbing, No edema  MSK: No CVA tenderness, Normal ROM, No injury  Neuro: AAOx1, Limited exam as patient not cooperative.   Psych: Appropriate, Cooperative, No depression, No anxiety  Skin: Clean, Dry and Intact                          13.8   23.11 )-----------( 292      ( 20 Jan 2021 13:06 )             43.2     01-20    150<H>  |  123<H>  |  148<H>  ----------------------------<  76  4.8   |  19<L>  |  4.16<H>    Ca    11.1<H>      20 Jan 2021 13:06  Phos  5.5     01-20  Mg     2.8     01-20    TPro  6.9  /  Alb  1.8<L>  /  TBili  2.1<H>  /  DBili  x   /  AST  63<H>  /  ALT  45  /  AlkPhos  266<H>  01-19    COVID-19 PCR: NotDetec (19 Jan 2021 15:21)    CAPILLARY BLOOD GLUCOSE          Culture - Urine (collected 19 Jan 2021 15:21)  Source: .Urine None  Final Report (20 Jan 2021 14:39):    No growth        < from: CT Head No Cont (01.19.21 @ 14:38) >  IMPRESSION:  Limited exam due to motion    No acute intracranial hemorrhage or acute territorial infarct.  If symptoms persist, follow-up MRI exam recommended.    < end of copied text >  < from: Xray Chest 1 View- PORTABLE-Urgent (Xray Chest 1 View- PORTABLE-Urgent .) (01.19.21 @ 14:18) >    IMPRESSION: Vague right perihilar infiltrate.    < end of copied text >

## 2021-01-20 NOTE — SWALLOW BEDSIDE ASSESSMENT ADULT - ADDITIONAL RECOMMENDATIONS
NUTRITION F/U. PT WITH FEEDING COMPROMISE AND DYSPHAGIA WHICH PLACE HIM AT NUTRITION RISK. NUTRITION F/U. PT WITH FEEDING COMPROMISE AND DYSPHAGIA WHICH PLACES HIM AT NUTRITION RISK.

## 2021-01-20 NOTE — SWALLOW BEDSIDE ASSESSMENT ADULT - SLP GENERAL OBSERVATIONS
Pt was awake but somewhat fatigued, distractible, inattentive and fidgety at times. He inconsistently followed 1 step verbal commands and inconsistently responsively verbalized during communicative probes. At these times, his motor speech abilities were grossly functional & his utterances were linguistically intact. However, his verbalizations were typically brief and variably contextually inappropriate consistent with Cognitive Dysfunction.(encephalopathy related to acute illness, ? Dementia).

## 2021-01-20 NOTE — SWALLOW BEDSIDE ASSESSMENT ADULT - PHARYNGEAL PHASE
Swallow triggered in a grossly acceptable time frame for age. Laryngeal lift on palpation during swallowing trials was very mildly decreased but felt to be functional. NO behavioral aspiration signs exhibited.

## 2021-01-20 NOTE — CHART NOTE - NSCHARTNOTEFT_GEN_A_CORE
Called by RN to review pt renal sonogram results    U/S of bladder and kidneys demonstrated : Overdistended, 1300 cc urinary bladder volume causing mild bilateral hydronephrosis    On physical exam, bladder is distended with + tenderness    Will insert Simons to monitor intake and output    Nephro f/u in am     Simons drained 1400 ml daria urine        As per RN she received call from [facility] informing her that pt tested positive for COVID + prior to transfer to ED. COVID PCR was negative in ED on Jan 19. Will repeat COVID PCR STAT and place pt on isolation pending results.

## 2021-01-20 NOTE — SWALLOW BEDSIDE ASSESSMENT ADULT - ORAL PREPARATORY PHASE
Pt was variably distractible and inattentive when PO was offered. Oral aperture was reduced when accepting PO.

## 2021-01-20 NOTE — CONSULT NOTE ADULT - ASSESSMENT
89 you with recent NSTEMI transferred from SNF with lethargy.  Found to be in CRISTELA with hypernatremia due to dehydration in presence of ace-I  Noted with hypercalcemia   Pna with leukocytosis     PLAN   - change ivf to d5w and inc rate    fu uop currently incontinent    prn bladder scan   - pna abx   - fu trend of calcium level.  if no improvement with current fluid composition, may need to change fluid composition  - palliative discussion noted, to revisit on friday

## 2021-01-20 NOTE — SWALLOW BEDSIDE ASSESSMENT ADULT - DIET PRIOR TO ADMI
The pt was on a mechanical soft texture diet with thin liquid consistencies at [facility]. The pt was reportedly on a mechanical soft texture diet with thin liquid consistencies at [facility].

## 2021-01-20 NOTE — SWALLOW BEDSIDE ASSESSMENT ADULT - ORAL PHASE
Bolus formation/transfer with mechanical soft solids were moderately to excessively prolonged and discontinuous. Bolus formation/transfer with pureed foods and liquids were achieved via mildly to moderately prolonged disorganized lingual pumping actions that were felt to be grossly functional with the latter PO consistencies. Piecemeal deglutition was evident. Scattered tongue debris noted with mechanical soft solids.

## 2021-01-20 NOTE — DIETITIAN INITIAL EVALUATION ADULT. - OTHER INFO
89 M with PMH of Afib, not currently on AC's, BPH,  post hospitalization s/p NSTEMI d/c to subacute rehab presenting to the ED from rehab with AMS, SOB. Per rehab, patient was found today in bed mumbling, not making sense with associated SOB. Sent patient to the ED for further evaluation. Pt is on baby ASA. Unable to obtain full HPI secondary to patients AMS. Admit for HCAP    RD consulted for nutrition assessment. During visit pt lying in bed, unable to provide any hx. S/p SLP evaluation recommending pureed diet w/ thin liquids. Current remains NPO. Daughter denies any known food allergies. Pt being followed by palliative care, family meeting completed this afternoon confirming DNR/DNI.

## 2021-01-20 NOTE — PROGRESS NOTE ADULT - ASSESSMENT
MEDICATIONS  (STANDING):  aspirin enteric coated 81 milliGRAM(s) Oral daily  cefepime  Injectable. 1000 milliGRAM(s) IV Push every 12 hours  dorzolamide 2%/timolol 0.5% Ophthalmic Solution 1 Drop(s) Both EYES two times a day  heparin   Injectable 5000 Unit(s) SubCutaneous every 12 hours  latanoprost 0.005% Ophthalmic Solution 1 Drop(s) Both EYES at bedtime  lisinopril 10 milliGRAM(s) Oral daily  sodium chloride 0.9%. 1000 milliLiter(s) (80 mL/Hr) IV Continuous <Continuous>    MEDICATIONS  (PRN):  acetaminophen   Tablet .. 650 milliGRAM(s) Oral every 6 hours PRN Temp greater or equal to 38.5C (101.3F), Mild Pain (1 - 3)  acetaminophen  Suppository .. 650 milliGRAM(s) Rectal every 6 hours PRN Temp greater or equal to 38.5C (101.3F)  aluminum hydroxide/magnesium hydroxide/simethicone Suspension 30 milliLiter(s) Oral every 4 hours PRN Dyspepsia  ondansetron Injectable 4 milliGRAM(s) IV Push every 6 hours PRN Nausea      ASSESSMENT:    Severe Sepsis secondary to RLL pneumonia HCAP vs Aspiration Pneumonia  CRISTELA due to Sepsis + Dehydration with likely ATN  Hypernatremia due to Dehydration  Afib (not on AC; deferred by patient and family in outpatient setting)  Hypercalcemia due to dehydration  Dementia  CAD  BPH      PLAN    Admit to medicine  IVF  Strict I/Os  Nephrology consulted  Avoid nephrotoxic agents  Empiric ABX  SLP evaluatioon  Palliative care consulted on admission as patinet's multiple comorbidities and presenting picture and guarded condition, patient may benefit from discussing comfort care/GOC      DVT Prophylaxis: Heparin subq  Discussed plan of care with patient's daughter Ayesha [phone] and Palliative care.

## 2021-01-20 NOTE — SWALLOW BEDSIDE ASSESSMENT ADULT - ASPIRATION PRECAUTIONS
MAINTAIN ASPIRATION PRECAUTIONS AS A CONSERVATIVE MEASURE. NOTE THAT WHILE DYSPHAGIA MAY PLACE PT AT A RELATIVELY HEIGHTENED RISK FOR EPISODIC ASPIRATION, HE DID NOT APPEAR TO BE ASPIRATING ON CLINICAL EXAM./yes

## 2021-01-20 NOTE — CONSULT NOTE ADULT - ASSESSMENT
89y old Male with hx of Afib (not on AC), BPH, w/ recent NSTEMI at [facility] (found after pt admitted there for a fall) and was dc'd to [facility] on 1/13. Now admitted at  on 1/19 for AMS and SOB. Found to have CXR showing R perihilar infiltrate, COVID-19 negative, CTH negative for acute pathology, leukocytosis (wbc>30) and CRISTELA with Cr>4. Palliative Care consulted to assist with establishing GOC.     1) AMS  - daughter clarified for primary team on admission that pt does not have dementia  - likely encephalopathy/hypoactive delirium from underlying infection and metabolic abnormalities  - CTH negative  - fall and aspiration precautions    2) HCAP  - CXR showing infiltrate on R  - IV abx  - supplemental O2 as needed  - can also consider addition of mucinex for audible secretions  - COVID negative  - WBC improved    3) CRISTELA  - unclear, possibly do to poor po intake  - renal US? nephro?  - Cr elevated>4, but somewhat improved today  - avoid nephrotoxic agents as able  - also would consider that pt just had NSTEMI    4) Recent NSTEMI  - recently treated for NSTEMI at outside hospital  - ? echo  - ? cardio to follow up and ensure this is not contributing?  - LFTs also elevated    5) Debility  - PPS<40%  - speech and swallow eval appreciated, awaiting impression  - suggest nutrition eval given evidence of malnutrition on exam   - as per care coordination notes pt fully independent and living alone, with visits from daughter who helps with errands and appointments, pt does not drive  - PT consulted as pt is coming from rehab    6) Prognosis  - guarded  - pt with advanced age, significant comorbidities, including recent hospitalization for NSTEMI, now with evidence of multiorgan compromise, recent fall, PPS<40% at this time, albumin 1.8 with evidence of malnutrition on exam; pt at increased risk for complications    7) GOC/Advanced Directives  - pt does not currently have capacity for decision making, but according to EMR, pt was was cognitively intact though very [place] prior  - surrogate decision maker is daughter Ayesha Le [phone]  - MOLST on chart 1/19: DNR and DNI  - GOC meeting pending scheduling with family    Thank you for including us in Mr. Long's care. Will continue to follow with you.    Ridge Oakley MD  Palliative Care Attending 89y old Male with hx of Afib (not on AC), BPH, w/ recent NSTEMI at [facility] (found after pt admitted there for a fall) and was dc'd to [facility] on 1/13. Now admitted at  on 1/19 for AMS and SOB. Found to have CXR showing R perihilar infiltrate, COVID-19 negative, CTH negative for acute pathology, leukocytosis (wbc>30) and CRISTELA with Cr>4. Palliative Care consulted to assist with establishing GOC.     1) AMS  - daughter clarified for primary team on admission that pt does not have dementia  - likely encephalopathy/hypoactive delirium from underlying infection and metabolic abnormalities  - CTH negative  - fall and aspiration precautions    2) HCAP  - CXR showing infiltrate on R  - IV abx  - supplemental O2 as needed  - can also consider addition of mucinex for audible secretions  - COVID negative  - WBC improved    3) CRISTELA  - unclear, possibly do to poor po intake  - renal US? nephro?  - Cr elevated>4, but somewhat improved today  - avoid nephrotoxic agents as able  - also would consider that pt just had NSTEMI    4) Recent NSTEMI  - recently treated for NSTEMI at outside hospital  - ? echo  - ? cardio to follow up and ensure this is not contributing?  - LFTs also elevated    5) Debility  - PPS<40%  - speech and swallow eval appreciated, awaiting impression  - suggest nutrition eval given evidence of malnutrition on exam   - as per care coordination notes pt fully independent and living alone, with visits from daughter who helps with errands and appointments, pt does not drive  - PT consulted as pt is coming from rehab    6) Prognosis  - guarded  - pt with advanced age, significant comorbidities, including recent hospitalization for NSTEMI, now with evidence of multiorgan compromise, recent fall, PPS<40% at this time, albumin 1.8 with evidence of malnutrition on exam; pt at increased risk for complications    7) GOC/Advanced Directives  - pt does not currently have capacity for decision making, but according to EMR, pt was was cognitively intact though very [place] prior  - surrogate decision maker is daughter Ayesha Le [phone]  - MOLST on chart 1/19: DNR and DNI  - GOC meeting scheduled for 1pm today, may need to follow up given we are still waiting for subspeciality impressions    Thank you for including us in Mr. Long's care. Will continue to follow with you.    Ridge Oakley MD  Palliative Care Attending

## 2021-01-20 NOTE — DIETITIAN INITIAL EVALUATION ADULT. - FACTORS AFF FOOD INTAKE
change in mental status/difficulty chewing/difficulty feeding self/difficulty swallowing/persistent lack of appetite

## 2021-01-21 LAB
ALBUMIN SERPL ELPH-MCNC: 1.4 G/DL — LOW (ref 3.3–5)
ALP SERPL-CCNC: 205 U/L — HIGH (ref 40–120)
ALT FLD-CCNC: 29 U/L — SIGNIFICANT CHANGE UP (ref 12–78)
ANION GAP SERPL CALC-SCNC: 10 MMOL/L — SIGNIFICANT CHANGE UP (ref 5–17)
ANION GAP SERPL CALC-SCNC: 7 MMOL/L — SIGNIFICANT CHANGE UP (ref 5–17)
AST SERPL-CCNC: 30 U/L — SIGNIFICANT CHANGE UP (ref 15–37)
BASOPHILS # BLD AUTO: 0.03 K/UL — SIGNIFICANT CHANGE UP (ref 0–0.2)
BASOPHILS NFR BLD AUTO: 0.1 % — SIGNIFICANT CHANGE UP (ref 0–2)
BILIRUB SERPL-MCNC: 1.3 MG/DL — HIGH (ref 0.2–1.2)
BUN SERPL-MCNC: 151 MG/DL — HIGH (ref 7–23)
BUN SERPL-MCNC: 151 MG/DL — HIGH (ref 7–23)
CALCIUM SERPL-MCNC: 10.8 MG/DL — HIGH (ref 8.5–10.1)
CALCIUM SERPL-MCNC: 11.1 MG/DL — HIGH (ref 8.5–10.1)
CHLORIDE SERPL-SCNC: 127 MMOL/L — HIGH (ref 96–108)
CHLORIDE SERPL-SCNC: 128 MMOL/L — HIGH (ref 96–108)
CO2 SERPL-SCNC: 18 MMOL/L — LOW (ref 22–31)
CO2 SERPL-SCNC: 19 MMOL/L — LOW (ref 22–31)
CREAT SERPL-MCNC: 3.86 MG/DL — HIGH (ref 0.5–1.3)
CREAT SERPL-MCNC: 4.07 MG/DL — HIGH (ref 0.5–1.3)
EOSINOPHIL # BLD AUTO: 0.21 K/UL — SIGNIFICANT CHANGE UP (ref 0–0.5)
EOSINOPHIL NFR BLD AUTO: 1 % — SIGNIFICANT CHANGE UP (ref 0–6)
GLUCOSE SERPL-MCNC: 121 MG/DL — HIGH (ref 70–99)
GLUCOSE SERPL-MCNC: 129 MG/DL — HIGH (ref 70–99)
HCT VFR BLD CALC: 40.8 % — SIGNIFICANT CHANGE UP (ref 39–50)
HGB BLD-MCNC: 13 G/DL — SIGNIFICANT CHANGE UP (ref 13–17)
IMM GRANULOCYTES NFR BLD AUTO: 1 % — SIGNIFICANT CHANGE UP (ref 0–1.5)
LYMPHOCYTES # BLD AUTO: 0.96 K/UL — LOW (ref 1–3.3)
LYMPHOCYTES # BLD AUTO: 4.5 % — LOW (ref 13–44)
MAGNESIUM SERPL-MCNC: 2.9 MG/DL — HIGH (ref 1.6–2.6)
MCHC RBC-ENTMCNC: 27.2 PG — SIGNIFICANT CHANGE UP (ref 27–34)
MCHC RBC-ENTMCNC: 31.9 GM/DL — LOW (ref 32–36)
MCV RBC AUTO: 85.4 FL — SIGNIFICANT CHANGE UP (ref 80–100)
MONOCYTES # BLD AUTO: 1.47 K/UL — HIGH (ref 0–0.9)
MONOCYTES NFR BLD AUTO: 6.9 % — SIGNIFICANT CHANGE UP (ref 2–14)
NEUTROPHILS # BLD AUTO: 18.37 K/UL — HIGH (ref 1.8–7.4)
NEUTROPHILS NFR BLD AUTO: 86.5 % — HIGH (ref 43–77)
PHOSPHATE SERPL-MCNC: 4.9 MG/DL — HIGH (ref 2.5–4.5)
PLATELET # BLD AUTO: 289 K/UL — SIGNIFICANT CHANGE UP (ref 150–400)
POTASSIUM SERPL-MCNC: 4.7 MMOL/L — SIGNIFICANT CHANGE UP (ref 3.5–5.3)
POTASSIUM SERPL-MCNC: 4.9 MMOL/L — SIGNIFICANT CHANGE UP (ref 3.5–5.3)
POTASSIUM SERPL-SCNC: 4.7 MMOL/L — SIGNIFICANT CHANGE UP (ref 3.5–5.3)
POTASSIUM SERPL-SCNC: 4.9 MMOL/L — SIGNIFICANT CHANGE UP (ref 3.5–5.3)
PROT SERPL-MCNC: 6.2 GM/DL — SIGNIFICANT CHANGE UP (ref 6–8.3)
RBC # BLD: 4.78 M/UL — SIGNIFICANT CHANGE UP (ref 4.2–5.8)
RBC # FLD: 17.1 % — HIGH (ref 10.3–14.5)
SARS-COV-2 RNA SPEC QL NAA+PROBE: SIGNIFICANT CHANGE UP
SODIUM SERPL-SCNC: 153 MMOL/L — HIGH (ref 135–145)
SODIUM SERPL-SCNC: 156 MMOL/L — HIGH (ref 135–145)
WBC # BLD: 21.26 K/UL — HIGH (ref 3.8–10.5)
WBC # FLD AUTO: 21.26 K/UL — HIGH (ref 3.8–10.5)

## 2021-01-21 PROCEDURE — 99233 SBSQ HOSP IP/OBS HIGH 50: CPT

## 2021-01-21 PROCEDURE — 70450 CT HEAD/BRAIN W/O DYE: CPT | Mod: 26

## 2021-01-21 RX ORDER — TAMSULOSIN HYDROCHLORIDE 0.4 MG/1
0.4 CAPSULE ORAL AT BEDTIME
Refills: 0 | Status: DISCONTINUED | OUTPATIENT
Start: 2021-01-21 | End: 2021-01-29

## 2021-01-21 RX ADMIN — HEPARIN SODIUM 5000 UNIT(S): 5000 INJECTION INTRAVENOUS; SUBCUTANEOUS at 06:01

## 2021-01-21 RX ADMIN — DORZOLAMIDE HYDROCHLORIDE TIMOLOL MALEATE 1 DROP(S): 20; 5 SOLUTION/ DROPS OPHTHALMIC at 22:26

## 2021-01-21 RX ADMIN — DORZOLAMIDE HYDROCHLORIDE TIMOLOL MALEATE 1 DROP(S): 20; 5 SOLUTION/ DROPS OPHTHALMIC at 11:13

## 2021-01-21 RX ADMIN — CEFEPIME 1000 MILLIGRAM(S): 1 INJECTION, POWDER, FOR SOLUTION INTRAMUSCULAR; INTRAVENOUS at 11:12

## 2021-01-21 RX ADMIN — TAMSULOSIN HYDROCHLORIDE 0.4 MILLIGRAM(S): 0.4 CAPSULE ORAL at 22:25

## 2021-01-21 RX ADMIN — SODIUM CHLORIDE 100 MILLILITER(S): 9 INJECTION, SOLUTION INTRAVENOUS at 22:19

## 2021-01-21 RX ADMIN — Medication 81 MILLIGRAM(S): at 11:13

## 2021-01-21 RX ADMIN — LATANOPROST 1 DROP(S): 0.05 SOLUTION/ DROPS OPHTHALMIC; TOPICAL at 22:19

## 2021-01-21 RX ADMIN — SODIUM CHLORIDE 100 MILLILITER(S): 9 INJECTION, SOLUTION INTRAVENOUS at 12:00

## 2021-01-21 RX ADMIN — HEPARIN SODIUM 5000 UNIT(S): 5000 INJECTION INTRAVENOUS; SUBCUTANEOUS at 22:20

## 2021-01-21 RX ADMIN — SODIUM CHLORIDE 75 MILLILITER(S): 9 INJECTION, SOLUTION INTRAVENOUS at 06:01

## 2021-01-21 RX ADMIN — CEFEPIME 1000 MILLIGRAM(S): 1 INJECTION, POWDER, FOR SOLUTION INTRAMUSCULAR; INTRAVENOUS at 22:19

## 2021-01-21 NOTE — PROGRESS NOTE ADULT - SUBJECTIVE AND OBJECTIVE BOX
NEPHROLOGY INTERVAL HPI/OVERNIGHT EVENTS:  SONY [id number]  HPI:  89 M with PMH of Afib, not currently on AC's, BPH,  post hospitalization s/p NSTEMI d/c to subacute rehab presenting to the ED from rehab with AMS, SOB. Per rehab, patient was found today in bed mumbling, not making sense with associated SOB. Sent patient to the ED for further evaluation. Pt is on baby ASA. Unable to obtain full HPI secondary to patients AMS. Admit for HCAP     (2021 15:49)      Patient is a 89y old  Male who presents with a chief complaint of lethargy/AMS (2021 15:28)  ------------  hx fromchart  pt admitted for yang with hypernatremia   hx of ? ckd with recent scr of 1.5      hypernatremia   was on NS, dc d  placed on D5W  creat improving      PAST MEDICAL & SURGICAL HISTORY:  Atrial fibrillation  Macular degeneration  History of BPH  Glaucoma  Inguinal hernia  left  History of prostate surgery  2019  S/P ORIF (open reduction internal fixation) fracture  right leg     FAMILY HISTORY:    MEDICATIONS  (STANDING):  aspirin enteric coated 81 milliGRAM(s) Oral daily  cefepime  Injectable. 1000 milliGRAM(s) IV Push every 12 hours  dextrose 5%. 1000 milliLiter(s) (100 mL/Hr) IV Continuous <Continuous>  dorzolamide 2%/timolol 0.5% Ophthalmic Solution 1 Drop(s) Both EYES two times a day  heparin   Injectable 5000 Unit(s) SubCutaneous every 12 hours  latanoprost 0.005% Ophthalmic Solution 1 Drop(s) Both EYES at bedtime  tamsulosin 0.4 milliGRAM(s) Oral at bedtime    MEDICATIONS  (PRN):  acetaminophen   Tablet .. 650 milliGRAM(s) Oral every 6 hours PRN Temp greater or equal to 38.5C (101.3F), Mild Pain (1 - 3)  acetaminophen  Suppository .. 650 milliGRAM(s) Rectal every 6 hours PRN Temp greater or equal to 38.5C (101.3F)  aluminum hydroxide/magnesium hydroxide/simethicone Suspension 30 milliLiter(s) Oral every 4 hours PRN Dyspepsia  ondansetron Injectable 4 milliGRAM(s) IV Push every 6 hours PRN Nausea      Allergies    No Known Allergies    Intolerances        I&O's Summary      Home Medications:  acetaminophen 500 mg oral tablet: 1 tab(s) orally every 4 hours, As Needed (2021 15:45)  dorzolamide-timolol 2.23%-0.68% ophthalmic solution: 1 drop(s) in each eye 2 times a day (2021 15:45)  ipratropium-albuterol 0.5 mg-2.5 mg/3 mLinhalation solution: 3 milliliter(s) inhaled 4 times a day, As Needed (2021 15:45)  latanoprost 0.005% ophthalmic solution: 1 drop(s) to each affected eye once a day (in the evening) (2021 15:45)  lisinopril 10 mg oral tablet: 1 tab(s) orally once a day (2021 15:45)      I&O's Detail    2021 07:01  -  2021 07:00  --------------------------------------------------------  IN:  Total IN: 0 mL    OUT:    Indwelling Catheter - Urethral (mL): 1700 mL  Total OUT: 1700 mL    Total NET: -1700 mL      2021 07:01  -  2021 20:22  --------------------------------------------------------  IN:    dextrose 5% + sodium chloride 0.9% w/ Additives: 350 mL  Total IN: 350 mL    OUT:  Total OUT: 0 mL    Total NET: 350 mL      Vital Signs Last 24 Hrs  T(C): 35.9 (2021 15:55), Max: 36.2 (2021 08:22)  T(F): 96.6 (2021 15:55), Max: 97.1 (2021 08:22)  HR: 69 (2021 15:55) (53 - 69)  BP: 154/73 (2021 15:55) (153/90 - 154/73)  BP(mean): --  RR: 18 (2021 15:55) (18 - 18)  SpO2: 98% (2021 15:55) (98% - 99%)      PHYSICAL EXAM:  GEN: encephalopathy  HEENT: dryMM  NECK supple no jvd  CV: RRR s1s2  LUNGS: b/l CTA  ABD: + soft,   EXT: no edema    LABS:                          13.0   21.26 )-----------( 289      ( 2021 08:48 )             40.8                           13.8   23.11 )-----------( 292      ( 2021 13:06 )             43.2       -    153<H>  |  127<H>  |  151<H>  ----------------------------<  129<H>  4.9   |  19<L>  |  3.86<H>    Ca    11.1<H>      2021 17:58  Phos  4.9     -21  Mg     2.9         TPro  6.2  /  Alb  1.4<L>  /  TBili  1.3<H>  /  DBili  x   /  AST  30  /  ALT  29  /  AlkPhos  205<H>      150<H>  |  123<H>  |  148<H>  ----------------------------<  76  4.8   |  19<L>  |  4.16<H>    Ca    11.1<H>      2021 13:06  Phos  5.5       Mg     2.8         TPro  6.9  /  Alb  1.8<L>  /  TBili  2.1<H>  /  DBili  x   /  AST  63<H>  /  ALT  45  /  AlkPhos  266<H>      PT/INR - ( 2021 14:30 )   PT: 15.8 sec;   INR: 1.37 ratio         PTT - ( 2021 14:30 )  PTT:38.2 sec  Urinalysis Basic - ( 2021 15:21 )    Color: Fabby / Appearance: Slightly Turbid / S.020 / pH: x  Gluc: x / Ketone: Negative  / Bili: Negative / Urobili: Negative mg/dL   Blood: x / Protein: 100 mg/dL / Nitrite: Negative   Leuk Esterase: Small / RBC: >50 /HPF / WBC 26-50   Sq Epi: x / Non Sq Epi: Occasional / Bacteria: Moderate      Magnesium, Serum: 2.8 mg/dL ( @ 13:06)  Phosphorus Level, Serum: 5.5 mg/dL ( @ 13:06)

## 2021-01-21 NOTE — PHYSICAL THERAPY INITIAL EVALUATION ADULT - MODALITIES TREATMENT COMMENTS
CTH/RCTH (-) ac findings. based on pt's mentation, speech, maintaining head turned L, inability to actively participate/engage recommend neuro consult-d/w staff at rounds

## 2021-01-21 NOTE — PHYSICAL THERAPY INITIAL EVALUATION ADULT - DIAGNOSIS, PT EVAL
Severe Sepsis secondary to RLL pneumonia HCAP vs Aspiration Pneumonia, CRISTELA, hypernatremia, hypercalcemia

## 2021-01-21 NOTE — PROGRESS NOTE ADULT - ASSESSMENT
89y old Male with hx of Afib (not on AC), BPH, w/ recent NSTEMI at [facility] (found after pt admitted there for a fall) and was dc'd to [facility] on 1/13. Now admitted at  on 1/19 for AMS and SOB. Found to have CXR showing R perihilar infiltrate, COVID-19 negative, CTH negative for acute pathology, leukocytosis (wbc>30) and CRISTELA with Cr>4. Palliative Care consulted to assist with establishing GOC.     1) AMS  - daughter clarified for primary team on admission that pt does not have dementia  - likely encephalopathy/hypoactive delirium from underlying infection and metabolic abnormalities  - CTH negative--> reordered today will follow up on results  - fall and aspiration precautions  - hopefully with reilly placed renal function will normalize and mentation will do the same    2) HCAP  - CXR showing infiltrate on R  - IV abx  - supplemental O2 as needed  - can also consider addition of mucinex for audible secretions  - COVID negative x2  - WBC improved    3) CRISTELA  - unclear, possibly do to poor po intake  - renal US showed retention of >1L--> reilly in place now draining well   - nephrology consult appreciated, fluids adjusted, monitoring  - Cr elevated>4, but slowly improving. Likely to continue improving with reilly placed  - avoid nephrotoxic agents as able  - also would consider that pt just had NSTEMI    4) Recent NSTEMI  - recently treated for NSTEMI at outside hospital  - ? echo  - ? cardio to follow up and ensure this is not contributing?    5) Debility  - PPS<40%  - speech and swallow eval appreciated, awaiting impression  - suggest nutrition eval given evidence of malnutrition on exam   - as per care coordination notes pt fully independent and living alone, with visits from daughter who helps with errands and appointments, pt does not drive  - PT consulted as pt is coming from rehab-->awaiting impression    6) Prognosis  - guarded  - pt with advanced age, significant comorbidities, including recent hospitalization for NSTEMI, now with evidence of multiorgan compromise, recent fall, PPS<40% at this time, albumin 1.8 with evidence of malnutrition on exam; pt at increased risk for complications    7) GOC/Advanced Directives  - pt does not currently have capacity for decision making, but according to EMR, pt was was cognitively intact though very [place] prior  - surrogate decision maker is daughter Ayesha Le [phone]  - MOL on chart 1/19: DNR and DNI  - GOC meeting initiated 1/20- plan to give pt more time to see if he can improve, as desired plan is for MIKE and home. Follow up meeting planned for tomorrow at 1pm to touch base/update    Thank you for including us in Mr. Long's care. Will continue to follow with you.    Ridge Oakley MD  Palliative Care Attending

## 2021-01-21 NOTE — PHYSICAL THERAPY INITIAL EVALUATION ADULT - PERTINENT HX OF CURRENT PROBLEM, REHAB EVAL
presenting to the ED from rehab with AMS, SOB. Per rehab, patient was found today in bed mumbling, not making sense with associated SOB. presenting to the ED from rehab with AMS, SOB. Per rehab staff, patient was found in bed mumbling, not making sense with associated SOB.

## 2021-01-21 NOTE — PROGRESS NOTE ADULT - SUBJECTIVE AND OBJECTIVE BOX
HPI: Pt seen and examined this am in follow up for sx. Pt remains lethargic and mumbling, but allows exam and is easily calmed. He denied pain, but was unable to provide any further information. As per RN, pt status quo, now with reilly with good output.     PAIN: ( )Yes   ( x)No    DYSPNEA: ( ) Yes  (x ) No- but pulling off O2 likely due to mild delirium    Review of Systems:  Unable to gather 2/2 to altered mental status      PHYSICAL EXAM:    Vital Signs Last 24 Hrs  T(C): 36.2 (21 Jan 2021 08:22), Max: 36.2 (20 Jan 2021 19:42)  T(F): 97.1 (21 Jan 2021 08:22), Max: 97.2 (20 Jan 2021 19:42)  HR: 53 (21 Jan 2021 08:22) (52 - 62)  BP: 153/90 (21 Jan 2021 08:22) (140/72 - 153/90)  BP(mean): --  RR: 18 (21 Jan 2021 08:22) (18 - 20)  SpO2: 99% (21 Jan 2021 08:22) (98% - 99%)  Daily     Daily     PPSV2: 30  %    General: Elderly male lying in bed, ill-appearing, awake, but distracted and not saying much, mumbling  Mental Status: answers to name, unable to discern further  HEENT: dmm, + temporal wasting  Lungs: dec at bases bl, + rhonchi and upper airway mucus  Cardiac: + s1 s2 rrr  GI: soft nt nd +bs  : incontinent  MSK/skin: moves all extremities spontaneously, some edema in feet, some muscle and fat wasting noted in extremities  Neuro: limited by mentation, responds to touch    LABS:                        13.0   21.26 )-----------( 289      ( 21 Jan 2021 08:48 )             40.8     01-21    156<H>  |  128<H>  |  x   ----------------------------<  121<H>  4.7   |  18<L>  |  4.07<H>    Ca    10.8<H>      21 Jan 2021 08:48  Phos  4.9     01-21  Mg     2.9     01-21    TPro  6.2  /  Alb  1.4<L>  /  TBili  1.3<H>  /  DBili  x   /  AST  30  /  ALT  29  /  AlkPhos  205<H>  01-21    PT/INR - ( 19 Jan 2021 14:30 )   PT: 15.8 sec;   INR: 1.37 ratio         PTT - ( 19 Jan 2021 14:30 )  PTT:38.2 sec  Albumin: Albumin, Serum: 1.4 g/dL (01-21 @ 08:48)      Allergies    No Known Allergies    Intolerances      MEDICATIONS  (STANDING):  aspirin enteric coated 81 milliGRAM(s) Oral daily  cefepime  Injectable. 1000 milliGRAM(s) IV Push every 12 hours  dextrose 5% + sodium chloride 0.9% 1000 milliLiter(s) (75 mL/Hr) IV Continuous <Continuous>  dextrose 5%. 1000 milliLiter(s) (100 mL/Hr) IV Continuous <Continuous>  dorzolamide 2%/timolol 0.5% Ophthalmic Solution 1 Drop(s) Both EYES two times a day  heparin   Injectable 5000 Unit(s) SubCutaneous every 12 hours  latanoprost 0.005% Ophthalmic Solution 1 Drop(s) Both EYES at bedtime    MEDICATIONS  (PRN):  acetaminophen   Tablet .. 650 milliGRAM(s) Oral every 6 hours PRN Temp greater or equal to 38.5C (101.3F), Mild Pain (1 - 3)  acetaminophen  Suppository .. 650 milliGRAM(s) Rectal every 6 hours PRN Temp greater or equal to 38.5C (101.3F)  aluminum hydroxide/magnesium hydroxide/simethicone Suspension 30 milliLiter(s) Oral every 4 hours PRN Dyspepsia  ondansetron Injectable 4 milliGRAM(s) IV Push every 6 hours PRN Nausea      RADIOLOGY:    EXAM:  US KIDNEYS AND BLADDER                        PROCEDURE DATE:  01/20/2021      IMPRESSION:    Overdistended, 1300 cc urinary bladder volume causing mild bilateral hydronephrosis.    JIMMY TORIBIO MD; Attending Radiologist  This document has been electronically signed. Jan 20 2021  8:12PM

## 2021-01-21 NOTE — PHYSICAL THERAPY INITIAL EVALUATION ADULT - ADDITIONAL COMMENTS
pt adm from [facility], per notes and during rounds indicated pt indep previously, active, walking blocks

## 2021-01-21 NOTE — PHYSICAL THERAPY INITIAL EVALUATION ADULT - GENERAL OBSERVATIONS, REHAB EVAL
pt rec'd supine in bed on 5E, head turned L, responds when name called and makes brief eye contact but overall decreased eye contact/tracking and unintelligible speech (except few "yeah"s)

## 2021-01-21 NOTE — PHYSICAL THERAPY INITIAL EVALUATION ADULT - PRECAUTIONS/LIMITATIONS, REHAB EVAL
h/o NSTEMI/cardiac precautions/fall precautions/isolation precautions recent NSTEMI/cardiac precautions/fall precautions/isolation precautions

## 2021-01-21 NOTE — PROGRESS NOTE ADULT - ASSESSMENT
MEDICATIONS  (STANDING):  aspirin enteric coated 81 milliGRAM(s) Oral daily  cefepime  Injectable. 1000 milliGRAM(s) IV Push every 12 hours  dextrose 5%. 1000 milliLiter(s) (100 mL/Hr) IV Continuous <Continuous>  dorzolamide 2%/timolol 0.5% Ophthalmic Solution 1 Drop(s) Both EYES two times a day  heparin   Injectable 5000 Unit(s) SubCutaneous every 12 hours  latanoprost 0.005% Ophthalmic Solution 1 Drop(s) Both EYES at bedtime    MEDICATIONS  (PRN):  acetaminophen   Tablet .. 650 milliGRAM(s) Oral every 6 hours PRN Temp greater or equal to 38.5C (101.3F), Mild Pain (1 - 3)  acetaminophen  Suppository .. 650 milliGRAM(s) Rectal every 6 hours PRN Temp greater or equal to 38.5C (101.3F)  aluminum hydroxide/magnesium hydroxide/simethicone Suspension 30 milliLiter(s) Oral every 4 hours PRN Dyspepsia  ondansetron Injectable 4 milliGRAM(s) IV Push every 6 hours PRN Nausea      ASSESSMENT:    Severe Sepsis secondary to RLL pneumonia HCAP vs Aspiration Pneumonia  CRISTELA due to Sepsis + Obstructive Uropathy  Hypernatremia due to Dehydration  Hypercalcemia likely due to dehdyration  Afib (not on AC; deferred by patient and family in outpatient setting)  Dementia  CAD  BPH      PLAN    Admitted to medicine  IVF with D5. Continue to monitor Na closely and avoid rapid correction  Strict I/Os s/p Simons insertion with >1400cc in bladder (1/20 evening). Start flomax.   Nephrology consulted  Avoid nephrotoxic agents  Empiric ABX. Cultures NGTD.   SLP evaluation->Diet adjusted accordingly  Palliative care consulted on admission as patient's multiple comorbidities and presenting picture and guarded condition, patient may benefit from discussing comfort care/GOC      DVT Prophylaxis: Heparin subq  Discussed plan of care with patient's daughter Ayesah [phone] and Palliative care.

## 2021-01-21 NOTE — PROGRESS NOTE ADULT - SUBJECTIVE AND OBJECTIVE BOX
SUBJECTIVE: Limited ROS/CC as patient is AMS.     Overnight Bladder US ~1600cc in bladder -> Simons insertion with 1400cc drained.     FROM H&P:    "89 M with PMH of Afib, not currently on AC's, BPH,  post hospitalization s/p NSTEMI d/c to subacute rehab presenting to the ED from rehab with AMS, SOB. Per rehab, patient was found today in bed mumbling, not making sense with associated SOB. Sent patient to the ED for further evaluation. Pt is on baby ASA. Unable to obtain full HPI secondary to patients AMS. Admit for HCAP."        PHYSICAL EXAM:    T(C): 36.2 (01-21-21 @ 08:22), Max: 36.2 (01-20-21 @ 19:42)  HR: 53 (01-21-21 @ 08:22) (52 - 62)  BP: 153/90 (01-21-21 @ 08:22) (140/72 - 153/90)  RR: 18 (01-21-21 @ 08:22) (18 - 20)  SpO2: 99% (01-21-21 @ 08:22) (98% - 99%)    General: AAOx0; NAD; lethargic  Head: AT/NC  ENT: Dry mucous membranes No Injury  Neck: Non-tender; No JVD  CVS: RRR, S1&S2, No murmur, b/L LE Edema  Respiratory: Decreased bibasilar breath sounds; Normal Respiratory Effort  Abdomen/GI: Soft, non-tender, non-distended, no guarding, no rebound, normal bowel sounds  : No bladder distention, Simons in place with dark concentrated urine  Extremites: No cyanosis, No clubbing, No edema  MSK: No CVA tenderness, Normal ROM, No injury  Neuro: AAOx1, Limited exam as patient not cooperative.   Psych: Unable to evaluate  Skin: Clean, Dry and Intact                            13.0   21.26 )-----------( 289      ( 21 Jan 2021 08:48 )             40.8     01-21    156<H>  |  128<H>  |  151<H>  ----------------------------<  121<H>  4.7   |  18<L>  |  4.07<H>    Ca    10.8<H>      21 Jan 2021 08:48  Phos  4.9     01-21  Mg     2.9     01-21    TPro  6.2  /  Alb  1.4<L>  /  TBili  1.3<H>  /  DBili  x   /  AST  30  /  ALT  29  /  AlkPhos  205<H>  01-21    COVID-19 PCR: NotDetec (20 Jan 2021 22:07)  COVID-19 PCR: NotDete (19 Jan 2021 15:21)    CAPILLARY BLOOD GLUCOSE          Culture - Blood (collected 19 Jan 2021 15:33)  Source: .Blood None  Preliminary Report (20 Jan 2021 20:01):    No growth to date.    Culture - Blood (collected 19 Jan 2021 15:33)  Source: .Blood None  Preliminary Report (20 Jan 2021 20:01):    No growth to date.    Culture - Urine (collected 19 Jan 2021 15:21)  Source: .Urine None  Final Report (20 Jan 2021 14:39):    No growth                            13.8   23.11 )-----------( 292      ( 20 Jan 2021 13:06 )             43.2     01-20    150<H>  |  123<H>  |  148<H>  ----------------------------<  76  4.8   |  19<L>  |  4.16<H>    Ca    11.1<H>      20 Jan 2021 13:06  Phos  5.5     01-20  Mg     2.8     01-20    TPro  6.9  /  Alb  1.8<L>  /  TBili  2.1<H>  /  DBili  x   /  AST  63<H>  /  ALT  45  /  AlkPhos  266<H>  01-19    COVID-19 PCR: NotDetec (19 Jan 2021 15:21)    CAPILLARY BLOOD GLUCOSE          Culture - Urine (collected 19 Jan 2021 15:21)  Source: .Urine None  Final Report (20 Jan 2021 14:39):    No growth        < from: CT Head No Cont (01.19.21 @ 14:38) >  IMPRESSION:  Limited exam due to motion    No acute intracranial hemorrhage or acute territorial infarct.  If symptoms persist, follow-up MRI exam recommended.    < end of copied text >  < from: Xray Chest 1 View- PORTABLE-Urgent (Xray Chest 1 View- PORTABLE-Urgent .) (01.19.21 @ 14:18) >    IMPRESSION: Vague right perihilar infiltrate.    < end of copied text >    < from: CT Head No Cont (01.21.21 @ 09:55) >  IMPRESSION:    -No acute intracranial findings.    < end of copied text >  < from: US Kidney and Bladder (01.20.21 @ 19:10) >  IMPRESSION:    Overdistended, 1300 cc urinary bladder volume causing mild bilateral hydronephrosis.    < end of copied text >

## 2021-01-21 NOTE — PROGRESS NOTE ADULT - ASSESSMENT
89 you with recent NSTEMI transferred from SNF with lethargy.  Found to be in CRISTELA with hypernatremia due to dehydration in presence of ace-I  Noted with hypercalcemia   Pna with leukocytosis     PLAN   - change ivf to d5w and inc rate    fu uop currently incontinent    prn bladder scan   - pna abx   - fu trend of calcium level.  if no improvement with current fluid composition, may need to change fluid composition  - palliative discussion noted, to revisit on friday 1/21  Na elevated to 156, On D5W  CRISTELA improving  PNA on abx   hypercalcemia, cont IVF  check PTH r/o primary hyperparathyroidism

## 2021-01-22 LAB
ADD ON TEST-SPECIMEN IN LAB: SIGNIFICANT CHANGE UP
ALBUMIN SERPL ELPH-MCNC: 1.6 G/DL — LOW (ref 3.3–5)
ALP SERPL-CCNC: 189 U/L — HIGH (ref 40–120)
ALT FLD-CCNC: 27 U/L — SIGNIFICANT CHANGE UP (ref 12–78)
ANION GAP SERPL CALC-SCNC: 8 MMOL/L — SIGNIFICANT CHANGE UP (ref 5–17)
ANION GAP SERPL CALC-SCNC: 9 MMOL/L — SIGNIFICANT CHANGE UP (ref 5–17)
ANISOCYTOSIS BLD QL: SLIGHT — SIGNIFICANT CHANGE UP
AST SERPL-CCNC: 29 U/L — SIGNIFICANT CHANGE UP (ref 15–37)
BASOPHILS # BLD AUTO: 0 K/UL — SIGNIFICANT CHANGE UP (ref 0–0.2)
BASOPHILS # BLD AUTO: 0.03 K/UL — SIGNIFICANT CHANGE UP (ref 0–0.2)
BASOPHILS NFR BLD AUTO: 0 % — SIGNIFICANT CHANGE UP (ref 0–2)
BASOPHILS NFR BLD AUTO: 0.2 % — SIGNIFICANT CHANGE UP (ref 0–2)
BILIRUB SERPL-MCNC: 1.2 MG/DL — SIGNIFICANT CHANGE UP (ref 0.2–1.2)
BIZARRE PLATELETS BLD QL SMEAR: PRESENT — SIGNIFICANT CHANGE UP
BUN SERPL-MCNC: 135 MG/DL — HIGH (ref 7–23)
BUN SERPL-MCNC: 139 MG/DL — HIGH (ref 7–23)
BURR CELLS BLD QL SMEAR: PRESENT — SIGNIFICANT CHANGE UP
CALCIUM SERPL-MCNC: 10.9 MG/DL — HIGH (ref 8.5–10.1)
CALCIUM SERPL-MCNC: 11.8 MG/DL — HIGH (ref 8.5–10.1)
CHLORIDE SERPL-SCNC: 125 MMOL/L — HIGH (ref 96–108)
CHLORIDE SERPL-SCNC: 127 MMOL/L — HIGH (ref 96–108)
CO2 SERPL-SCNC: 17 MMOL/L — LOW (ref 22–31)
CO2 SERPL-SCNC: 18 MMOL/L — LOW (ref 22–31)
CREAT SERPL-MCNC: 3.35 MG/DL — HIGH (ref 0.5–1.3)
CREAT SERPL-MCNC: 3.56 MG/DL — HIGH (ref 0.5–1.3)
EOSINOPHIL # BLD AUTO: 0.35 K/UL — SIGNIFICANT CHANGE UP (ref 0–0.5)
EOSINOPHIL # BLD AUTO: 0.63 K/UL — HIGH (ref 0–0.5)
EOSINOPHIL NFR BLD AUTO: 2 % — SIGNIFICANT CHANGE UP (ref 0–6)
EOSINOPHIL NFR BLD AUTO: 3.3 % — SIGNIFICANT CHANGE UP (ref 0–6)
GLUCOSE SERPL-MCNC: 110 MG/DL — HIGH (ref 70–99)
GLUCOSE SERPL-MCNC: 113 MG/DL — HIGH (ref 70–99)
HCT VFR BLD CALC: 37.9 % — LOW (ref 39–50)
HCT VFR BLD CALC: 43.1 % — SIGNIFICANT CHANGE UP (ref 39–50)
HGB BLD-MCNC: 12.1 G/DL — LOW (ref 13–17)
HGB BLD-MCNC: 13.6 G/DL — SIGNIFICANT CHANGE UP (ref 13–17)
IMM GRANULOCYTES NFR BLD AUTO: 1.5 % — SIGNIFICANT CHANGE UP (ref 0–1.5)
LG PLATELETS BLD QL AUTO: SLIGHT — SIGNIFICANT CHANGE UP
LYMPHOCYTES # BLD AUTO: 1.04 K/UL — SIGNIFICANT CHANGE UP (ref 1–3.3)
LYMPHOCYTES # BLD AUTO: 1.39 K/UL — SIGNIFICANT CHANGE UP (ref 1–3.3)
LYMPHOCYTES # BLD AUTO: 5.5 % — LOW (ref 13–44)
LYMPHOCYTES # BLD AUTO: 8 % — LOW (ref 13–44)
MAGNESIUM SERPL-MCNC: 2.6 MG/DL — SIGNIFICANT CHANGE UP (ref 1.6–2.6)
MAGNESIUM SERPL-MCNC: 2.8 MG/DL — HIGH (ref 1.6–2.6)
MANUAL SMEAR VERIFICATION: SIGNIFICANT CHANGE UP
MCHC RBC-ENTMCNC: 27.1 PG — SIGNIFICANT CHANGE UP (ref 27–34)
MCHC RBC-ENTMCNC: 27.3 PG — SIGNIFICANT CHANGE UP (ref 27–34)
MCHC RBC-ENTMCNC: 31.6 GM/DL — LOW (ref 32–36)
MCHC RBC-ENTMCNC: 31.9 GM/DL — LOW (ref 32–36)
MCV RBC AUTO: 84.8 FL — SIGNIFICANT CHANGE UP (ref 80–100)
MCV RBC AUTO: 86.5 FL — SIGNIFICANT CHANGE UP (ref 80–100)
MONOCYTES # BLD AUTO: 1.22 K/UL — HIGH (ref 0–0.9)
MONOCYTES # BLD AUTO: 1.41 K/UL — HIGH (ref 0–0.9)
MONOCYTES NFR BLD AUTO: 7 % — SIGNIFICANT CHANGE UP (ref 2–14)
MONOCYTES NFR BLD AUTO: 7.4 % — SIGNIFICANT CHANGE UP (ref 2–14)
NEUTROPHILS # BLD AUTO: 14.27 K/UL — HIGH (ref 1.8–7.4)
NEUTROPHILS # BLD AUTO: 15.65 K/UL — HIGH (ref 1.8–7.4)
NEUTROPHILS NFR BLD AUTO: 82 % — HIGH (ref 43–77)
NEUTROPHILS NFR BLD AUTO: 82.1 % — HIGH (ref 43–77)
NRBC # BLD: 0 /100 — SIGNIFICANT CHANGE UP (ref 0–0)
NRBC # BLD: SIGNIFICANT CHANGE UP /100 WBCS (ref 0–0)
PHOSPHATE SERPL-MCNC: 4.2 MG/DL — SIGNIFICANT CHANGE UP (ref 2.5–4.5)
PLAT MORPH BLD: NORMAL — SIGNIFICANT CHANGE UP
PLATELET # BLD AUTO: 255 K/UL — SIGNIFICANT CHANGE UP (ref 150–400)
PLATELET # BLD AUTO: 275 K/UL — SIGNIFICANT CHANGE UP (ref 150–400)
POTASSIUM SERPL-MCNC: 4.6 MMOL/L — SIGNIFICANT CHANGE UP (ref 3.5–5.3)
POTASSIUM SERPL-MCNC: 5.1 MMOL/L — SIGNIFICANT CHANGE UP (ref 3.5–5.3)
POTASSIUM SERPL-SCNC: 4.6 MMOL/L — SIGNIFICANT CHANGE UP (ref 3.5–5.3)
POTASSIUM SERPL-SCNC: 5.1 MMOL/L — SIGNIFICANT CHANGE UP (ref 3.5–5.3)
PROT SERPL-MCNC: 6.7 GM/DL — SIGNIFICANT CHANGE UP (ref 6–8.3)
RBC # BLD: 4.47 M/UL — SIGNIFICANT CHANGE UP (ref 4.2–5.8)
RBC # BLD: 4.98 M/UL — SIGNIFICANT CHANGE UP (ref 4.2–5.8)
RBC # FLD: 17.6 % — HIGH (ref 10.3–14.5)
RBC # FLD: 18 % — HIGH (ref 10.3–14.5)
RBC BLD AUTO: ABNORMAL
SODIUM SERPL-SCNC: 152 MMOL/L — HIGH (ref 135–145)
SODIUM SERPL-SCNC: 152 MMOL/L — HIGH (ref 135–145)
VARIANT LYMPHS # BLD: 1 % — SIGNIFICANT CHANGE UP (ref 0–6)
WBC # BLD: 17.4 K/UL — HIGH (ref 3.8–10.5)
WBC # BLD: 19.05 K/UL — HIGH (ref 3.8–10.5)
WBC # FLD AUTO: 17.4 K/UL — HIGH (ref 3.8–10.5)
WBC # FLD AUTO: 19.05 K/UL — HIGH (ref 3.8–10.5)

## 2021-01-22 PROCEDURE — 99233 SBSQ HOSP IP/OBS HIGH 50: CPT

## 2021-01-22 RX ADMIN — HEPARIN SODIUM 5000 UNIT(S): 5000 INJECTION INTRAVENOUS; SUBCUTANEOUS at 11:04

## 2021-01-22 RX ADMIN — TAMSULOSIN HYDROCHLORIDE 0.4 MILLIGRAM(S): 0.4 CAPSULE ORAL at 21:24

## 2021-01-22 RX ADMIN — CEFEPIME 1000 MILLIGRAM(S): 1 INJECTION, POWDER, FOR SOLUTION INTRAMUSCULAR; INTRAVENOUS at 10:58

## 2021-01-22 RX ADMIN — DORZOLAMIDE HYDROCHLORIDE TIMOLOL MALEATE 1 DROP(S): 20; 5 SOLUTION/ DROPS OPHTHALMIC at 11:04

## 2021-01-22 RX ADMIN — DORZOLAMIDE HYDROCHLORIDE TIMOLOL MALEATE 1 DROP(S): 20; 5 SOLUTION/ DROPS OPHTHALMIC at 21:24

## 2021-01-22 RX ADMIN — SODIUM CHLORIDE 100 MILLILITER(S): 9 INJECTION, SOLUTION INTRAVENOUS at 12:35

## 2021-01-22 RX ADMIN — LATANOPROST 1 DROP(S): 0.05 SOLUTION/ DROPS OPHTHALMIC; TOPICAL at 21:24

## 2021-01-22 RX ADMIN — HEPARIN SODIUM 5000 UNIT(S): 5000 INJECTION INTRAVENOUS; SUBCUTANEOUS at 21:24

## 2021-01-22 RX ADMIN — CEFEPIME 1000 MILLIGRAM(S): 1 INJECTION, POWDER, FOR SOLUTION INTRAMUSCULAR; INTRAVENOUS at 22:05

## 2021-01-22 RX ADMIN — Medication 81 MILLIGRAM(S): at 11:03

## 2021-01-22 RX ADMIN — SODIUM CHLORIDE 100 MILLILITER(S): 9 INJECTION, SOLUTION INTRAVENOUS at 22:05

## 2021-01-22 NOTE — PROGRESS NOTE ADULT - ASSESSMENT
MEDICATIONS  (STANDING):  aspirin enteric coated 81 milliGRAM(s) Oral daily  cefepime  Injectable. 1000 milliGRAM(s) IV Push every 12 hours  dextrose 5%. 1000 milliLiter(s) (100 mL/Hr) IV Continuous <Continuous>  dorzolamide 2%/timolol 0.5% Ophthalmic Solution 1 Drop(s) Both EYES two times a day  heparin   Injectable 5000 Unit(s) SubCutaneous every 12 hours  latanoprost 0.005% Ophthalmic Solution 1 Drop(s) Both EYES at bedtime  tamsulosin 0.4 milliGRAM(s) Oral at bedtime    MEDICATIONS  (PRN):  acetaminophen   Tablet .. 650 milliGRAM(s) Oral every 6 hours PRN Temp greater or equal to 38.5C (101.3F), Mild Pain (1 - 3)  acetaminophen  Suppository .. 650 milliGRAM(s) Rectal every 6 hours PRN Temp greater or equal to 38.5C (101.3F)  aluminum hydroxide/magnesium hydroxide/simethicone Suspension 30 milliLiter(s) Oral every 4 hours PRN Dyspepsia  ondansetron Injectable 4 milliGRAM(s) IV Push every 6 hours PRN Nausea      ASSESSMENT:    Severe Sepsis secondary to RLL pneumonia HCAP vs Aspiration Pneumonia  CRISTELA due to Sepsis + Obstructive Uropathy  Hypernatremia due to Dehydration  Hypercalcemia likely due to dehdyration  Afib (not on AC; deferred by patient and family in outpatient setting)  Dementia  CAD  BPH      PLAN    Admitted to medicine  IVF with D5. Continue to monitor Na closely and avoid rapid correction  Strict I/Os s/p Simons insertion with >1400cc in bladder (1/20 evening). Start flomax.   Nephrology consulted  Avoid nephrotoxic agents  Empiric ABX. Cultures NGTD.   SLP evaluation->Diet adjusted accordingly  Palliative care consulted on admission as patient's multiple comorbidities and presenting picture and guarded condition, patient may benefit from discussing comfort care/GOC  Discussed possiblity of neurologic event with family (1/22) including negative CT however MRI more definitive but likely will require sedation which can cause more harm than good in patient's current state. Including empirically starting full dose AC for TE prevention in afib. Daughter agreeabel to hold full dose AC and correct metabolic disturbances first and will proceed with neurologic assessment as patient become more stable and can tolerate MRI.       DVT Prophylaxis: Heparin subq  Discussed plan of care with patient's daughter Ayesha [phone] and Palliative care.

## 2021-01-22 NOTE — PROGRESS NOTE ADULT - ASSESSMENT
89y old Male with hx of Afib (not on AC), BPH, w/ recent NSTEMI at [facility] (found after pt admitted there for a fall) and was dc'd to [facility] on 1/13. Now admitted at  on 1/19 for AMS and SOB. Found to have CXR showing R perihilar infiltrate, COVID-19 negative, CTH negative for acute pathology, leukocytosis (wbc>30) and CRISTELA with Cr>4. Palliative Care consulted to assist with establishing GOC.     1) AMS  - daughter clarified for primary team on admission that pt does not have dementia  - likely encephalopathy/hypoactive delirium from underlying infection and metabolic abnormalities  - CTH negative--> repeat also negative  - however still with garbled speech which may be due to kidney dysfunction. ?MRI if does not improve with improving renal function  - fall and aspiration precautions  - hopefully with reilly placed renal function will normalize and mentation will do the same    2) HCAP  - CXR showing infiltrate on R  - IV abx  - supplemental O2 as needed  - can also consider addition of mucinex for audible secretions  - COVID negative x2  - WBC improved    3) CRISTELA  - unclear, possibly do to poor po intake  - renal US showed retention of >1L--> reilly in place now draining well   - nephrology consult appreciated, fluids adjusted, monitoring  - Cr elevated>4, but slowly improving. Likely to continue improving with reilly placed  - avoid nephrotoxic agents as able  - also would consider that pt just had NSTEMI    4) Recent NSTEMI  - recently treated for NSTEMI at outside hospital  - ? echo  - ? cardio to follow up and ensure this is not contributing?    5) Debility  - PPS<40%  - speech and swallow eval appreciated, awaiting impression  - suggest nutrition eval given evidence of malnutrition on exam   - as per care coordination notes pt fully independent and living alone, with visits from daughter who helps with errands and appointments, pt does not drive  - PT consulted as pt is coming from rehab-->max assist, not following commands, MIKE recommended    6) Prognosis  - guarded  - pt with advanced age, significant comorbidities, including recent hospitalization for NSTEMI, now with evidence of multiorgan compromise, recent fall, PPS<40% at this time, albumin 1.8 with evidence of malnutrition on exam; pt at increased risk for complications    7) GOC/Advanced Directives  - pt does not currently have capacity for decision making, but according to EMR, pt was was cognitively intact though very [place] prior  - surrogate decision maker is daughter Ayesha Le [phone]  - [facility] on chart 1/19: DNR and DNI  - [facility] meeting initiated 1/20- plan to give pt more time to see if he can improve, as desired plan is for MIKE and home. Follow up meeting planned for today at 1pm to touch base/update. See [facility] note that will follow    Thank you for including us in Mr. Long's care. Will continue to follow with you.    Ridge Oakley MD  Palliative Care Attending

## 2021-01-22 NOTE — PROGRESS NOTE ADULT - SUBJECTIVE AND OBJECTIVE BOX
NEPHROLOGY INTERVAL HPI/OVERNIGHT EVENTS:  SONY [id number]  HPI:  89 M with PMH of Afib, not currently on AC's, BPH,  post hospitalization s/p NSTEMI d/c to subacute rehab presenting to the ED from rehab with AMS, SOB. Per rehab, patient was found today in bed mumbling, not making sense with associated SOB. Sent patient to the ED for further evaluation. Pt is on baby ASA. Unable to obtain full HPI secondary to patients AMS. Admit for HCAP     (2021 15:49)      Patient is a 89y old  Male who presents with a chief complaint of lethargy/AMS (2021 15:28)  ------------  hx from chart  pt admitted for yang with hypernatremia   hx of ? ckd with recent scr of 1.5      hypernatremia   was on NS, dc d  placed on D5W  creat improving          doubt hypercalcemia due to dehydration solely, since calcium up while Na and Creat improving  PTH pending.  If elevated, and primary hyper para suspected, may get cinecalcet 30 mg BID  If PTH suppressed may be due to malignancy, sarcoid etc   -will need other means to treat the underlying dz , calcitonin etc      PAST MEDICAL & SURGICAL HISTORY:  Atrial fibrillation  Macular degeneration  History of BPH  Glaucoma  Inguinal hernia  left  History of prostate surgery  2019  S/P ORIF (open reduction internal fixation) fracture  right leg     FAMILY HISTORY:    MEDICATIONS  (STANDING):  aspirin enteric coated 81 milliGRAM(s) Oral daily  cefepime  Injectable. 1000 milliGRAM(s) IV Push every 12 hours  dextrose 5%. 1000 milliLiter(s) (100 mL/Hr) IV Continuous <Continuous>  dorzolamide 2%/timolol 0.5% Ophthalmic Solution 1 Drop(s) Both EYES two times a day  heparin   Injectable 5000 Unit(s) SubCutaneous every 12 hours  latanoprost 0.005% Ophthalmic Solution 1 Drop(s) Both EYES at bedtime  tamsulosin 0.4 milliGRAM(s) Oral at bedtime    MEDICATIONS  (PRN):  acetaminophen   Tablet .. 650 milliGRAM(s) Oral every 6 hours PRN Temp greater or equal to 38.5C (101.3F), Mild Pain (1 - 3)  acetaminophen  Suppository .. 650 milliGRAM(s) Rectal every 6 hours PRN Temp greater or equal to 38.5C (101.3F)  aluminum hydroxide/magnesium hydroxide/simethicone Suspension 30 milliLiter(s) Oral every 4 hours PRN Dyspepsia  ondansetron Injectable 4 milliGRAM(s) IV Push every 6 hours PRN Nausea      Allergies    No Known Allergies    Intolerances        I&O's Summary      Home Medications:  acetaminophen 500 mg oral tablet: 1 tab(s) orally every 4 hours, As Needed (2021 15:45)  dorzolamide-timolol 2.23%-0.68% ophthalmic solution: 1 drop(s) in each eye 2 times a day (2021 15:45)  ipratropium-albuterol 0.5 mg-2.5 mg/3 mLinhalation solution: 3 milliliter(s) inhaled 4 times a day, As Needed (2021 15:45)  latanoprost 0.005% ophthalmic solution: 1 drop(s) to each affected eye once a day (in the evening) (2021 15:45)  lisinopril 10 mg oral tablet: 1 tab(s) orally once a day (2021 15:45)      I&O's Detail    2021 07:01  -  2021 07:00  --------------------------------------------------------  IN:    dextrose 5% + sodium chloride 0.9% w/ Additives: 350 mL  Total IN: 350 mL    OUT:    Indwelling Catheter - Urethral (mL): 600 mL  Total OUT: 600 mL    Total NET: -250 mL      2021 07:01  -  2021 13:41  --------------------------------------------------------  IN:    dextrose 5%: 943 mL  Total IN: 943 mL    OUT:  Total OUT: 0 mL    Total NET: 943 mL      T(C): 36.1 (2021 22:16), Max: 36.1 (2021 22:16)  T(F): 97 (2021 22:16), Max: 97 (2021 22:16)  HR: 50 (2021 11:13) (50 - 69)  BP: 169/70 (2021 11:13) (154/73 - 169/70)  BP(mean): --  RR: 18 (2021 15:55) (18 - 18)  SpO2: 96% (2021 11:13) (96% - 99%)    PHYSICAL EXAM:  GEN: encephalopathy  HEENT: dryMM  Vital Signs Last 24 Hrs  NECK supple no jvd  CV: RRR s1s2  LUNGS: b/l CTA  ABD: + soft,   EXT: no edema    LABS:                            13.6   19.05 )-----------( 275      ( 2021 10:08 )             43.1                           13.0   21.26 )-----------( 289      ( 2021 08:48 )             40.8                           13.8   23.11 )-----------( 292      ( 2021 13:06 )             43.2     01-22    152<H>  |  127<H>  |  139<H>  ----------------------------<  110<H>  5.1   |  17<L>  |  3.56<H>    Ca    11.8<H>      2021 10:08  Phos  4.2       Mg     2.8         TPro  6.7  /  Alb  1.6<L>  /  TBili  1.2  /  DBili  x   /  AST  29  /  ALT  27  /  AlkPhos  189<H>      153<H>  |  127<H>  |  151<H>  ----------------------------<  129<H>  4.9   |  19<L>  |  3.86<H>    Ca    11.1<H>      2021 17:58  Phos  4.9       Mg     2.9         TPro  6.2  /  Alb  1.4<L>  /  TBili  1.3<H>  /  DBili  x   /  AST  30  /  ALT  29  /  AlkPhos  205<H>      150<H>  |  123<H>  |  148<H>  ----------------------------<  76  4.8   |  19<L>  |  4.16<H>    Ca    11.1<H>      2021 13:06  Phos  5.5       Mg     2.8         TPro  6.9  /  Alb  1.8<L>  /  TBili  2.1<H>  /  DBili  x   /  AST  63<H>  /  ALT  45  /  AlkPhos  266<H>      PT/INR - ( 2021 14:30 )   PT: 15.8 sec;   INR: 1.37 ratio         PTT - ( 2021 14:30 )  PTT:38.2 sec  Urinalysis Basic - ( 2021 15:21 )    Color: Fabby / Appearance: Slightly Turbid / S.020 / pH: x  Gluc: x / Ketone: Negative  / Bili: Negative / Urobili: Negative mg/dL   Blood: x / Protein: 100 mg/dL / Nitrite: Negative   Leuk Esterase: Small / RBC: >50 /HPF / WBC 26-50   Sq Epi: x / Non Sq Epi: Occasional / Bacteria: Moderate      Magnesium, Serum: 2.8 mg/dL ( @ 13:06)  Phosphorus Level, Serum: 5.5 mg/dL ( @ 13:06)

## 2021-01-22 NOTE — GOALS OF CARE CONVERSATION - ADVANCED CARE PLANNING - TREATMENT GUIDELINE COMMENT
c/w current interventions hoping for improvement and return to baseline.     *Spent 16 minutes discussing GOC with family including Advance care planning, explanation and discussion of advance directives, reviewed all treatment/dispo options, and MOLST.
c/w current measures to give pt time to improve.   MOLST decisions: DNR and DNI    *Spent 22 minutes discussing GOC with family including Advance care planning, explanation and discussion of advance directives, reviewed all treatment/dispo options, and MOLST.

## 2021-01-22 NOTE — GOALS OF CARE CONVERSATION - ADVANCED CARE PLANNING - WHAT MATTERS MOST
Pt's comfort and having time to improve.
Pt's comfort and having a chance to improve. Family was tyrone and clearly care a great deal for Mr. Long.

## 2021-01-22 NOTE — PROGRESS NOTE ADULT - SUBJECTIVE AND OBJECTIVE BOX
HPI: Pt seen and examined this am in follow up for sx and GOC. Pt awake and seemed to be trying to communicate, though majority of words were garbled. He denied pain or discomfort, endorsed hunger and thirst when asked. Pt was able to take few bites of apple sauce when offered. No other issues noted.     Plan for follow up GOC meeting with family at 1pm for updates. See GOC note that will follow.      PAIN: denies    DYSPNEA: denies      ROS:  Remainder limited by mentation and garbled speech      PHYSICAL EXAM:    Vital Signs Last 24 Hrs  T(C): 36.1 (21 Jan 2021 22:16), Max: 36.1 (21 Jan 2021 22:16)  T(F): 97 (21 Jan 2021 22:16), Max: 97 (21 Jan 2021 22:16)  HR: 51 (21 Jan 2021 22:16) (51 - 69)  BP: 161/61 (21 Jan 2021 22:16) (154/73 - 161/61)  BP(mean): --  RR: 18 (21 Jan 2021 15:55) (18 - 18)  SpO2: 99% (21 Jan 2021 22:16) (98% - 99%)  Daily     Daily     PPSV2: 30  %    General: Elderly male lying in bed, ill-appearing, awake and trying to respond but speech garbled, not yet following commands  Mental Status: answers to name, unable to discern further  HEENT: dmm, + temporal wasting  Lungs: dec at bases bl, + rhonchi and upper airway mucus  Cardiac: + s1 s2 rrr  GI: soft nt nd +bs  : incontinent  MSK/skin: moves all extremities spontaneously, some edema in feet, some muscle and fat wasting noted in extremities  Neuro: limited by mentation, responds to touch, garbled speech    LABS:                        13.0   21.26 )-----------( 289      ( 21 Jan 2021 08:48 )             40.8     01-21    153<H>  |  127<H>  |  151<H>  ----------------------------<  129<H>  4.9   |  19<L>  |  3.86<H>    Ca    11.1<H>      21 Jan 2021 17:58  Phos  4.9     01-21  Mg     2.9     01-21    TPro  6.2  /  Alb  1.4<L>  /  TBili  1.3<H>  /  DBili  x   /  AST  30  /  ALT  29  /  AlkPhos  205<H>  01-21    Today's labs pending result    Albumin: Albumin, Serum: 1.4 g/dL (01-21 @ 08:48)      Allergies    No Known Allergies    Intolerances      MEDICATIONS  (STANDING):  aspirin enteric coated 81 milliGRAM(s) Oral daily  cefepime  Injectable. 1000 milliGRAM(s) IV Push every 12 hours  dextrose 5%. 1000 milliLiter(s) (100 mL/Hr) IV Continuous <Continuous>  dorzolamide 2%/timolol 0.5% Ophthalmic Solution 1 Drop(s) Both EYES two times a day  heparin   Injectable 5000 Unit(s) SubCutaneous every 12 hours  latanoprost 0.005% Ophthalmic Solution 1 Drop(s) Both EYES at bedtime  tamsulosin 0.4 milliGRAM(s) Oral at bedtime    MEDICATIONS  (PRN):  acetaminophen   Tablet .. 650 milliGRAM(s) Oral every 6 hours PRN Temp greater or equal to 38.5C (101.3F), Mild Pain (1 - 3)  acetaminophen  Suppository .. 650 milliGRAM(s) Rectal every 6 hours PRN Temp greater or equal to 38.5C (101.3F)  aluminum hydroxide/magnesium hydroxide/simethicone Suspension 30 milliLiter(s) Oral every 4 hours PRN Dyspepsia  ondansetron Injectable 4 milliGRAM(s) IV Push every 6 hours PRN Nausea      RADIOLOGY:    EXAM:  CT BRAIN                        PROCEDURE DATE:  01/21/2021      IMPRESSION:    -No acute intracranial findings.    ABBI DOUGLAS   This document has been electronically signed. Jan 21 2021 10:39AM

## 2021-01-22 NOTE — GOALS OF CARE CONVERSATION - ADVANCED CARE PLANNING - CONVERSATION DETAILS
Called Mr. Long's daughter Ayesha to review pt's medical issues and family's overall wishes for his care. Ayesha explained that the pt has actually been doing well for himself up to the past few weeks. She noted that he is very independent, though needing some help with things since the pandemic started. He lives alone and is able to complete all ADLs and most iADLs, except for grocery shopping and laundry, which his daughter adamantly took over in June (to keep him safe in COVID). Though the pt walks with a rolling walker, he continued to walk 8 blocks to see his friends at their clubhouse. She explained though that pt had hernia surgery in July, coming home afterward with PT, but refused this after a few visits. Up until 2 weeks ago the pt was showering and handling his own needs, but then family started to notice his appetite started to decline as well as his cognition. Thus, his current presentation is a significant deviation from his baseline. Ayesha is worried, noting that the toughest thing to consider is not being able to see him since he left the last hospital on 1/8, thus open to support in gathering information and making a plan.     First, took time to establish what has been shared thus far. Ayesha noted her knowledge of pt's likely pneumonia and speaking with Dr. Cox earlier who noted concern about pt's kidney function. She summarized by saying everything was going downhill. We reviewed pt's presenting sx, workup of AMS with CTH that was negative and further investigation of underlying infection or organ dysfunction that could also cause this. Shared CXR results showing R perihilar infiltrate, normal wbc count in comparison to pt's which was over 30, but improved today. Noted that remainder of labs also showed severe kidney dysfunction that is also slightly improved- reviewing Cr, what normal is, the need to watch this closely as decline in function may necessitate consideration of dialysis if deemed appropriate by nephrology. Also discussed recent NSTEMI and need to consider this as well when reviewing overall balance of the body. She recalled this being diagnosed at the other hospital, understanding how the heart having recent insult could undermine other organs- knowing this will also be watched for need to work up on this admission. Lastly, we discussed pt's overall debility, noting that he came from [facility] and that PT has already been consulted to assess physical capacity as well.     After this review, took time to assess family's understanding and they were able to verbalize their grasp of the gravity of the situation. They also understood the need to clarify advanced directives, given we are all hoping with them for the best, but need a plan for all scenarios. Ayesha noted that she is not the HCP (she is surrogate), but she and her brother are in agreement about everything. She confirmed the DNR and DNI put in place on admission, agreeing that should her father decline to that point she would not want him on machines. She also confirmed initial plan to give the pt more time to see if he will rally and perhaps return to [facility] before hopefully returning home. However, she was open to further conversation about other plans, including complete comfort, if he did not improve or began to decline.     Ultimately, Mr. Long was doing pretty well for himself, with some physical debility recently. The family understands that he is up against a number of medical challenges and forms of instability in his body, but would like a few days to see how he fairs. We agreed with this, noting that further subspecialty impressions are also still coming in and perhaps we should, for these reasons, have a follow up meeting on Friday to touch base. Also, considering that she has not seen her father, which is one of the most troubling aspects of these most recent hospitalizations, we offered the option of arranging a virtual visit. Ayesha was very excited about this and shared her gratefulness to the staff for caring for her father. Reassured her that we will continue to do our best for him and at the very least, keep her informed. Shared above with TOBI Wolf and Dr. Cox. Follow-up conversation is scheduled for Friday at 1pm.
Called Mr. Long's daughter Ayesha for planned follow up conversation. Provided updates on how pt is doing. Noted he was wakeful this morning and able to endorse thirst, accepting apple sauce when fed. However, also shared concern that his speech is still quite garbled. Explained that his labs are continuing to improve, with wbc count normalizing slowly (down from 34 to 19) and renal function likely improving after placement of reilly. However, noted that Cr is still high, though trending down, which could explain continued AMS. Went on to note repeat CTH was negative and to support the plan team shared with her earlier today- to pursue MRI. Explained that this is a more sensitive test and will help team ensure they are not missing anything.     Overall Ayesha was encouraged, though understanding that the pt still is up against some challenges. She is hoping for clearing of his mentation and remains prepared to talk more if this does not happen. For now, we shared intent to hope with her for further improvement. She agreed with plan to see how pt fairs over the weekend and was open to us reaching out again next week, even if it is just to check in on her and the family.     Will continue to follow, returning to service on Monday.

## 2021-01-22 NOTE — PROGRESS NOTE ADULT - SUBJECTIVE AND OBJECTIVE BOX
SUBJECTIVE: Limited ROS/CC as patient is AMS.     1/20-1/21:Overnight Bladder US ~1600cc in bladder -> Simons insertion with 1400cc drained.   1/22: More energy; Still confused and speech is still off. Unsure whether expressive aphasia is related to neurologic event (patient has risks including Afib not on AC) vs Metabolic. Limited ROS/CC    FROM H&P:    "89 M with PMH of Afib, not currently on AC's, BPH,  post hospitalization s/p NSTEMI d/c to subacute rehab presenting to the ED from rehab with AMS, SOB. Per rehab, patient was found today in bed mumbling, not making sense with associated SOB. Sent patient to the ED for further evaluation. Pt is on baby ASA. Unable to obtain full HPI secondary to patients AMS. Admit for HCAP."        PHYSICAL EXAM:    T(C): 36.1 (01-22-21 @ 15:45), Max: 36.1 (01-21-21 @ 22:16)  HR: 54 (01-22-21 @ 15:45) (50 - 54)  BP: 166/66 (01-22-21 @ 15:45) (161/61 - 169/70)  RR: 18 (01-22-21 @ 15:45) (18 - 18)  SpO2: 97% (01-22-21 @ 15:45) (96% - 99%)    General: AAOx0; NAD; lethargic  Head: AT/NC  ENT: Dry mucous membranes No Injury  Neck: Non-tender; No JVD  CVS: RRR, S1&S2, No murmur, b/L LE Edema  Respiratory: Decreased bibasilar breath sounds; Normal Respiratory Effort  Abdomen/GI: Soft, non-tender, non-distended, no guarding, no rebound, normal bowel sounds  : No bladder distention, Simons in place with dark concentrated urine  Extremites: No cyanosis, No clubbing, No edema  MSK: No CVA tenderness, Normal ROM, No injury  Neuro: AAOx1, Limited exam as patient not cooperative.   Psych: Unable to evaluate  Skin: Clean, Dry and Intact                          13.6   19.05 )-----------( 275      ( 22 Jan 2021 10:08 )             43.1     01-22    152<H>  |  127<H>  |  139<H>  ----------------------------<  110<H>  5.1   |  17<L>  |  3.56<H>    Ca    11.8<H>      22 Jan 2021 10:08  Phos  4.2     01-22  Mg     2.8     01-22    TPro  6.7  /  Alb  1.6<L>  /  TBili  1.2  /  DBili  x   /  AST  29  /  ALT  27  /  AlkPhos  189<H>  01-22    COVID-19 PCR: NotDetec (20 Jan 2021 22:07)  COVID-19 PCR: NotDetec (19 Jan 2021 15:21)    CAPILLARY BLOOD GLUCOSE                                  13.0   21.26 )-----------( 289      ( 21 Jan 2021 08:48 )             40.8     01-21    156<H>  |  128<H>  |  151<H>  ----------------------------<  121<H>  4.7   |  18<L>  |  4.07<H>    Ca    10.8<H>      21 Jan 2021 08:48  Phos  4.9     01-21  Mg     2.9     01-21    TPro  6.2  /  Alb  1.4<L>  /  TBili  1.3<H>  /  DBili  x   /  AST  30  /  ALT  29  /  AlkPhos  205<H>  01-21    COVID-19 PCR: NotDetec (20 Jan 2021 22:07)  COVID-19 PCR: NotDetec (19 Jan 2021 15:21)    CAPILLARY BLOOD GLUCOSE          Culture - Blood (collected 19 Jan 2021 15:33)  Source: .Blood None  Preliminary Report (20 Jan 2021 20:01):    No growth to date.    Culture - Blood (collected 19 Jan 2021 15:33)  Source: .Blood None  Preliminary Report (20 Jan 2021 20:01):    No growth to date.    Culture - Urine (collected 19 Jan 2021 15:21)  Source: .Urine None  Final Report (20 Jan 2021 14:39):    No growth                            13.8   23.11 )-----------( 292      ( 20 Jan 2021 13:06 )             43.2     01-20    150<H>  |  123<H>  |  148<H>  ----------------------------<  76  4.8   |  19<L>  |  4.16<H>    Ca    11.1<H>      20 Jan 2021 13:06  Phos  5.5     01-20  Mg     2.8     01-20    TPro  6.9  /  Alb  1.8<L>  /  TBili  2.1<H>  /  DBili  x   /  AST  63<H>  /  ALT  45  /  AlkPhos  266<H>  01-19    COVID-19 PCR: NotDetec (19 Jan 2021 15:21)    CAPILLARY BLOOD GLUCOSE          Culture - Urine (collected 19 Jan 2021 15:21)  Source: .Urine None  Final Report (20 Jan 2021 14:39):    No growth        < from: CT Head No Cont (01.19.21 @ 14:38) >  IMPRESSION:  Limited exam due to motion    No acute intracranial hemorrhage or acute territorial infarct.  If symptoms persist, follow-up MRI exam recommended.    < end of copied text >  < from: Xray Chest 1 View- PORTABLE-Urgent (Xray Chest 1 View- PORTABLE-Urgent .) (01.19.21 @ 14:18) >    IMPRESSION: Vague right perihilar infiltrate.    < end of copied text >    < from: CT Head No Cont (01.21.21 @ 09:55) >  IMPRESSION:    -No acute intracranial findings.    < end of copied text >  < from: US Kidney and Bladder (01.20.21 @ 19:10) >  IMPRESSION:    Overdistended, 1300 cc urinary bladder volume causing mild bilateral hydronephrosis.    < end of copied text >

## 2021-01-22 NOTE — PROGRESS NOTE ADULT - ASSESSMENT
89 you with recent NSTEMI transferred from SNF with lethargy.  Found to be in C[facility] with hypernatremia due to dehydration in presence of ace-I  Noted with hypercalcemia   Pna with leukocytosis     PLAN   - change ivf to d5w and inc rate    fu uop currently incontinent    prn bladder scan   - pna abx   - fu trend of calcium level.  if no improvement with current fluid composition, may need to change fluid composition  - palliative discussion noted, to revisit on friday 1/21  Na elevated to 156, On D5W  CRISTELA improving  PNA on abx   hypercalcemia, cont IVF  check PTH r/o primary hyperparathyroidism    1/22  doubt hypercalcemia due to dehydration solely, since calcium up while Na and Creat improving  PTH pending.  If elevated, and primary hyper para suspected, may get cinecalcet 30 mg BID  If PTH suppressed may be due to malignancy, sarcoid etc   -will need other means to treat the underlying dz , calcitonin etc  Low K diet, K up to 5.1 on hydration  Simons draining good UO  Dr Simmons covering this weekend

## 2021-01-23 LAB
ALBUMIN SERPL ELPH-MCNC: 1.4 G/DL — LOW (ref 3.3–5)
ALP SERPL-CCNC: 182 U/L — HIGH (ref 40–120)
ALT FLD-CCNC: 25 U/L — SIGNIFICANT CHANGE UP (ref 12–78)
ANION GAP SERPL CALC-SCNC: 8 MMOL/L — SIGNIFICANT CHANGE UP (ref 5–17)
AST SERPL-CCNC: 22 U/L — SIGNIFICANT CHANGE UP (ref 15–37)
BASOPHILS # BLD AUTO: 0.04 K/UL — SIGNIFICANT CHANGE UP (ref 0–0.2)
BASOPHILS NFR BLD AUTO: 0.2 % — SIGNIFICANT CHANGE UP (ref 0–2)
BILIRUB SERPL-MCNC: 1 MG/DL — SIGNIFICANT CHANGE UP (ref 0.2–1.2)
BUN SERPL-MCNC: 120 MG/DL — HIGH (ref 7–23)
CALCIUM SERPL-MCNC: 10.5 MG/DL — HIGH (ref 8.5–10.1)
CHLORIDE SERPL-SCNC: 120 MMOL/L — HIGH (ref 96–108)
CO2 SERPL-SCNC: 18 MMOL/L — LOW (ref 22–31)
CREAT SERPL-MCNC: 3.21 MG/DL — HIGH (ref 0.5–1.3)
EOSINOPHIL # BLD AUTO: 1.01 K/UL — HIGH (ref 0–0.5)
EOSINOPHIL NFR BLD AUTO: 6.2 % — HIGH (ref 0–6)
GLUCOSE SERPL-MCNC: 92 MG/DL — SIGNIFICANT CHANGE UP (ref 70–99)
HCT VFR BLD CALC: 39.1 % — SIGNIFICANT CHANGE UP (ref 39–50)
HGB BLD-MCNC: 12.5 G/DL — LOW (ref 13–17)
IMM GRANULOCYTES NFR BLD AUTO: 1.8 % — HIGH (ref 0–1.5)
LYMPHOCYTES # BLD AUTO: 0.98 K/UL — LOW (ref 1–3.3)
LYMPHOCYTES # BLD AUTO: 6 % — LOW (ref 13–44)
MAGNESIUM SERPL-MCNC: 2.4 MG/DL — SIGNIFICANT CHANGE UP (ref 1.6–2.6)
MCHC RBC-ENTMCNC: 27.4 PG — SIGNIFICANT CHANGE UP (ref 27–34)
MCHC RBC-ENTMCNC: 32 GM/DL — SIGNIFICANT CHANGE UP (ref 32–36)
MCV RBC AUTO: 85.7 FL — SIGNIFICANT CHANGE UP (ref 80–100)
MONOCYTES # BLD AUTO: 1.21 K/UL — HIGH (ref 0–0.9)
MONOCYTES NFR BLD AUTO: 7.5 % — SIGNIFICANT CHANGE UP (ref 2–14)
NEUTROPHILS # BLD AUTO: 12.71 K/UL — HIGH (ref 1.8–7.4)
NEUTROPHILS NFR BLD AUTO: 78.3 % — HIGH (ref 43–77)
PHOSPHATE SERPL-MCNC: 3.7 MG/DL — SIGNIFICANT CHANGE UP (ref 2.5–4.5)
PLATELET # BLD AUTO: 251 K/UL — SIGNIFICANT CHANGE UP (ref 150–400)
POTASSIUM SERPL-MCNC: 4.6 MMOL/L — SIGNIFICANT CHANGE UP (ref 3.5–5.3)
POTASSIUM SERPL-SCNC: 4.6 MMOL/L — SIGNIFICANT CHANGE UP (ref 3.5–5.3)
PROT SERPL-MCNC: 6.3 GM/DL — SIGNIFICANT CHANGE UP (ref 6–8.3)
RBC # BLD: 4.56 M/UL — SIGNIFICANT CHANGE UP (ref 4.2–5.8)
RBC # FLD: 17.9 % — HIGH (ref 10.3–14.5)
SODIUM SERPL-SCNC: 146 MMOL/L — HIGH (ref 135–145)
WBC # BLD: 16.24 K/UL — HIGH (ref 3.8–10.5)
WBC # FLD AUTO: 16.24 K/UL — HIGH (ref 3.8–10.5)

## 2021-01-23 PROCEDURE — 99233 SBSQ HOSP IP/OBS HIGH 50: CPT

## 2021-01-23 RX ORDER — HYDRALAZINE HCL 50 MG
25 TABLET ORAL ONCE
Refills: 0 | Status: COMPLETED | OUTPATIENT
Start: 2021-01-23 | End: 2021-01-23

## 2021-01-23 RX ORDER — CALCITONIN SALMON 200 [IU]/ML
290 INJECTION, SOLUTION INTRAMUSCULAR EVERY 12 HOURS
Refills: 0 | Status: COMPLETED | OUTPATIENT
Start: 2021-01-23 | End: 2021-01-25

## 2021-01-23 RX ADMIN — CEFEPIME 1000 MILLIGRAM(S): 1 INJECTION, POWDER, FOR SOLUTION INTRAMUSCULAR; INTRAVENOUS at 09:41

## 2021-01-23 RX ADMIN — SODIUM CHLORIDE 100 MILLILITER(S): 9 INJECTION, SOLUTION INTRAVENOUS at 08:17

## 2021-01-23 RX ADMIN — HEPARIN SODIUM 5000 UNIT(S): 5000 INJECTION INTRAVENOUS; SUBCUTANEOUS at 21:59

## 2021-01-23 RX ADMIN — DORZOLAMIDE HYDROCHLORIDE TIMOLOL MALEATE 1 DROP(S): 20; 5 SOLUTION/ DROPS OPHTHALMIC at 09:43

## 2021-01-23 RX ADMIN — Medication 25 MILLIGRAM(S): at 09:33

## 2021-01-23 RX ADMIN — CALCITONIN SALMON 290 INTERNATIONAL UNIT(S): 200 INJECTION, SOLUTION INTRAMUSCULAR at 22:53

## 2021-01-23 RX ADMIN — Medication 81 MILLIGRAM(S): at 09:33

## 2021-01-23 RX ADMIN — SODIUM CHLORIDE 100 MILLILITER(S): 9 INJECTION, SOLUTION INTRAVENOUS at 18:32

## 2021-01-23 RX ADMIN — DORZOLAMIDE HYDROCHLORIDE TIMOLOL MALEATE 1 DROP(S): 20; 5 SOLUTION/ DROPS OPHTHALMIC at 21:58

## 2021-01-23 RX ADMIN — CEFEPIME 1000 MILLIGRAM(S): 1 INJECTION, POWDER, FOR SOLUTION INTRAMUSCULAR; INTRAVENOUS at 21:59

## 2021-01-23 RX ADMIN — LATANOPROST 1 DROP(S): 0.05 SOLUTION/ DROPS OPHTHALMIC; TOPICAL at 21:58

## 2021-01-23 RX ADMIN — HEPARIN SODIUM 5000 UNIT(S): 5000 INJECTION INTRAVENOUS; SUBCUTANEOUS at 09:41

## 2021-01-23 RX ADMIN — TAMSULOSIN HYDROCHLORIDE 0.4 MILLIGRAM(S): 0.4 CAPSULE ORAL at 21:58

## 2021-01-23 NOTE — PROGRESS NOTE ADULT - ASSESSMENT
89 you with recent NSTEMI transferred from SNF with lethargy.  Found to be in [facility] with hypernatremia due to dehydration in presence of ace   Noted with hypercalcemia  Pna with leukocytosis   AMS     CRISTELA - mutifactorial - Cr improving    prerenal ? +/- ATN sec to Hyerpcalcemia renal vasoconstriction on ACE     Obstructive Uropathy with bilat hydro and blader retention - Simons in place   Hypernatremia slowly improving   continue d5W - change to NS once Na+ improved   good UOP  - continue I and O's w catheter    PTH 32 - normal w hypercalcemia - not supressed - subclinical HPT?     check SPEP, FLC, IF for completeness ,PTH rp    malignancy ? age appropriate workup    Corrected Ca 12.6 w Alb 1.4 - give calcitonin x 4 doses    check urine lytes/osm     AMS - ? acute    Neuro workup    medicine evaluation       Palliative care evaluation noted     Dr Kennedy al to resume care Monday

## 2021-01-23 NOTE — PROGRESS NOTE ADULT - ASSESSMENT
MEDICATIONS  (STANDING):  aspirin enteric coated 81 milliGRAM(s) Oral daily  cefepime  Injectable. 1000 milliGRAM(s) IV Push every 12 hours  dextrose 5%. 1000 milliLiter(s) (100 mL/Hr) IV Continuous <Continuous>  dorzolamide 2%/timolol 0.5% Ophthalmic Solution 1 Drop(s) Both EYES two times a day  heparin   Injectable 5000 Unit(s) SubCutaneous every 12 hours  latanoprost 0.005% Ophthalmic Solution 1 Drop(s) Both EYES at bedtime  tamsulosin 0.4 milliGRAM(s) Oral at bedtime    MEDICATIONS  (PRN):  acetaminophen   Tablet .. 650 milliGRAM(s) Oral every 6 hours PRN Temp greater or equal to 38.5C (101.3F), Mild Pain (1 - 3)  acetaminophen  Suppository .. 650 milliGRAM(s) Rectal every 6 hours PRN Temp greater or equal to 38.5C (101.3F)  aluminum hydroxide/magnesium hydroxide/simethicone Suspension 30 milliLiter(s) Oral every 4 hours PRN Dyspepsia  ondansetron Injectable 4 milliGRAM(s) IV Push every 6 hours PRN Nausea      ASSESSMENT:    Severe Sepsis secondary to RLL pneumonia HCAP vs Aspiration Pneumonia  CRISTELA due to Sepsis + Obstructive Uropathy  Hypernatremia due to Dehydration  Hypercalcemia likely due to dehdyration  Afib (not on AC; deferred by patient and family in outpatient setting)  Dementia  CAD  BPH      PLAN    Admitted to medicine  IVF with D5. Continue to monitor Na closely and avoid rapid correction  Strict I/Os s/p Simons insertion with >1400cc in bladder (1/20 evening). Start flomax.   Nephrology consulted  Avoid nephrotoxic agents  Empiric ABX. Cultures NGTD.   SLP evaluation->Diet adjusted accordingly  Palliative care consulted on admission as patient's multiple comorbidities and presenting picture and guarded condition, patient may benefit from discussing comfort care/GOC  Discussed possiblity of neurologic event with family (1/22) including negative CT however MRI more definitive but likely will require sedation which can cause more harm than good in patient's current state. Including empirically starting full dose AC for TE prevention in afib. Daughter agreeabel to hold full dose AC and correct metabolic disturbances first and will proceed with neurologic assessment as patient become more stable and can tolerate MRI.  1/23: improved Cr and Na with no improvment in mental status and even more lethargy.   Neurology consulted.      DVT Prophylaxis: Heparin subq  Discussed plan of care with patient's daughter Ayesha [phone] and Palliative care.

## 2021-01-23 NOTE — PROGRESS NOTE ADULT - SUBJECTIVE AND OBJECTIVE BOX
SUBJECTIVE: Limited ROS/CC as patient is AMS.     1/20-1/21:Overnight Bladder US ~1600cc in bladder -> Simons insertion with 1400cc drained.   1/22: More energy; Still confused and speech is still off. Unsure whether expressive aphasia is related to neurologic event (patient has risks including Afib not on AC) vs Metabolic. Limited ROS/CC  1:23: more lethargic today. Producing less words. Urine output adequate. Cr and Na improving.     FROM H&P:    "89 M with PMH of Afib, not currently on AC's, BPH,  post hospitalization s/p NSTEMI d/c to subacute rehab presenting to the ED from rehab with AMS, SOB. Per rehab, patient was found today in bed mumbling, not making sense with associated SOB. Sent patient to the ED for further evaluation. Pt is on baby ASA. Unable to obtain full HPI secondary to patients AMS. Admit for HCAP."        PHYSICAL EXAM:    T(C): 36.3 (01-23-21 @ 08:27), Max: 36.4 (01-23-21 @ 00:12)  HR: 52 (01-23-21 @ 08:27) (52 - 54)  BP: 144/63 (01-23-21 @ 11:12) (122/75 - 179/74)  RR: 18 (01-23-21 @ 08:27) (18 - 18)  SpO2: 98% (01-23-21 @ 08:27) (97% - 100%)    General: AAOx0; NAD; lethargic  Head: AT/NC  ENT: Dry mucous membranes No Injury  Neck: Non-tender; No JVD  CVS: RRR, S1&S2, No murmur, b/L LE Edema  Respiratory: Decreased bibasilar breath sounds; Normal Respiratory Effort  Abdomen/GI: Soft, non-tender, non-distended, normal bowel sounds  : No bladder distention, Simons in place with dark concentrated urine  Extremites: No cyanosis, No clubbing, LE Edema  Neuro: AAOx1, Limited exam as patient not cooperative.   Psych: Unable to evaluate  Skin: Clean, Dry and Intact                          12.5   16.24 )-----------( 251      ( 23 Jan 2021 10:33 )             39.1     01-23    146<H>  |  120<H>  |  120<H>  ----------------------------<  92  4.6   |  18<L>  |  3.21<H>    Ca    10.5<H>      23 Jan 2021 10:33  Phos  3.7     01-23  Mg     2.4     01-23    TPro  6.3  /  Alb  1.4<L>  /  TBili  1.0  /  DBili  x   /  AST  22  /  ALT  25  /  AlkPhos  182<H>  01-23    COVID-19 PCR: NotDetec (20 Jan 2021 22:07)  COVID-19 PCR: NotDetec (19 Jan 2021 15:21)    CAPILLARY BLOOD GLUCOSE                                13.6   19.05 )-----------( 275      ( 22 Jan 2021 10:08 )             43.1     01-22    152<H>  |  127<H>  |  139<H>  ----------------------------<  110<H>  5.1   |  17<L>  |  3.56<H>    Ca    11.8<H>      22 Jan 2021 10:08  Phos  4.2     01-22  Mg     2.8     01-22    TPro  6.7  /  Alb  1.6<L>  /  TBili  1.2  /  DBili  x   /  AST  29  /  ALT  27  /  AlkPhos  189<H>  01-22    COVID-19 PCR: NotDetec (20 Jan 2021 22:07)  COVID-19 PCR: NotDetec (19 Jan 2021 15:21)    CAPILLARY BLOOD GLUCOSE                                  13.0   21.26 )-----------( 289      ( 21 Jan 2021 08:48 )             40.8     01-21    156<H>  |  128<H>  |  151<H>  ----------------------------<  121<H>  4.7   |  18<L>  |  4.07<H>    Ca    10.8<H>      21 Jan 2021 08:48  Phos  4.9     01-21  Mg     2.9     01-21    TPro  6.2  /  Alb  1.4<L>  /  TBili  1.3<H>  /  DBili  x   /  AST  30  /  ALT  29  /  AlkPhos  205<H>  01-21    COVID-19 PCR: NotDetec (20 Jan 2021 22:07)  COVID-19 PCR: NotDete (19 Jan 2021 15:21)    CAPILLARY BLOOD GLUCOSE          Culture - Blood (collected 19 Jan 2021 15:33)  Source: .Blood None  Preliminary Report (20 Jan 2021 20:01):    No growth to date.    Culture - Blood (collected 19 Jan 2021 15:33)  Source: .Blood None  Preliminary Report (20 Jan 2021 20:01):    No growth to date.    Culture - Urine (collected 19 Jan 2021 15:21)  Source: .Urine None  Final Report (20 Jan 2021 14:39):    No growth                            13.8   23.11 )-----------( 292      ( 20 Jan 2021 13:06 )             43.2     01-20    150<H>  |  123<H>  |  148<H>  ----------------------------<  76  4.8   |  19<L>  |  4.16<H>    Ca    11.1<H>      20 Jan 2021 13:06  Phos  5.5     01-20  Mg     2.8     01-20    TPro  6.9  /  Alb  1.8<L>  /  TBili  2.1<H>  /  DBili  x   /  AST  63<H>  /  ALT  45  /  AlkPhos  266<H>  01-19    COVID-19 PCR: NotDete (19 Jan 2021 15:21)    CAPILLARY BLOOD GLUCOSE          Culture - Urine (collected 19 Jan 2021 15:21)  Source: .Urine None  Final Report (20 Jan 2021 14:39):    No growth        < from: CT Head No Cont (01.19.21 @ 14:38) >  IMPRESSION:  Limited exam due to motion    No acute intracranial hemorrhage or acute territorial infarct.  If symptoms persist, follow-up MRI exam recommended.    < end of copied text >  < from: Xray Chest 1 View- PORTABLE-Urgent (Xray Chest 1 View- PORTABLE-Urgent .) (01.19.21 @ 14:18) >    IMPRESSION: Vague right perihilar infiltrate.    < end of copied text >    < from: CT Head No Cont (01.21.21 @ 09:55) >  IMPRESSION:    -No acute intracranial findings.    < end of copied text >  < from: US Kidney and Bladder (01.20.21 @ 19:10) >  IMPRESSION:    Overdistended, 1300 cc urinary bladder volume causing mild bilateral hydronephrosis.    < end of copied text >    I personally reviewed vitals, labs, orders and imaging

## 2021-01-23 NOTE — PROGRESS NOTE ADULT - SUBJECTIVE AND OBJECTIVE BOX
COVERING FOR DR Carmichael       89 M with PMH of Afib, not currently on AC's, BPH,  post hospitalization s/p NSTEMI d/c to subacute rehab presenting to the ED from rehab with AMS, SOB. Per rehab, patient was found  in bed mumbling, not making sense with associated SOB. Sent patient to the ED for further evaluation. Pt is on baby ASA. Unable to obtain full HPI secondary to patients AMS.   hx from chart  renal eval for  pt admitted for yang with hypernatremia   hx of ? ckd with recent scr of 1.5    Remains altered MS       PAST MEDICAL & SURGICAL HISTORY:  Atrial fibrillation  Macular degeneration  History of BPH  Glaucoma  Inguinal hernia  left  History of prostate surgery  2019  S/P ORIF (open reduction internal fixation) fracture  right leg     FAMILY HISTORY:    MEDICATIONS  (STANDING):  aspirin enteric coated 81 milliGRAM(s) Oral daily  cefepime  Injectable. 1000 milliGRAM(s) IV Push every 12 hours  dextrose 5%. 1000 milliLiter(s) (100 mL/Hr) IV Continuous <Continuous>  dorzolamide 2%/timolol 0.5% Ophthalmic Solution 1 Drop(s) Both EYES two times a day  heparin   Injectable 5000 Unit(s) SubCutaneous every 12 hours  latanoprost 0.005% Ophthalmic Solution 1 Drop(s) Both EYES at bedtime  tamsulosin 0.4 milliGRAM(s) Oral at bedtime    MEDICATIONS  (PRN):  acetaminophen   Tablet .. 650 milliGRAM(s) Oral every 6 hours PRN Temp greater or equal to 38.5C (101.3F), Mild Pain (1 - 3)  acetaminophen  Suppository .. 650 milliGRAM(s) Rectal every 6 hours PRN Temp greater or equal to 38.5C (101.3F)  aluminum hydroxide/magnesium hydroxide/simethicone Suspension 30 milliLiter(s) Oral every 4 hours PRN Dyspepsia  ondansetron Injectable 4 milliGRAM(s) IV Push every 6 hours PRN Nausea        Allergies    No Known Allergies    Intolerances        I&O's Summary      Home Medications:  acetaminophen 500 mg oral tablet: 1 tab(s) orally every 4 hours, As Needed (2021 15:45)  dorzolamide-timolol 2.23%-0.68% ophthalmic solution: 1 drop(s) in each eye 2 times a day (2021 15:45)  ipratropium-albuterol 0.5 mg-2.5 mg/3 mLinhalation solution: 3 milliliter(s) inhaled 4 times a day, As Needed (2021 15:45)  latanoprost 0.005% ophthalmic solution: 1 drop(s) to each affected eye once a day (in the evening) (2021 15:45)  lisinopril 10 mg oral tablet: 1 tab(s) orally once a day (2021 15:45)      Vital Signs Last 24 Hrs  T(C): 36 (2021 21:16), Max: 36.4 (2021 00:12)  T(F): 96.8 (2021 21:16), Max: 97.6 (2021 00:12)  HR: 68 (2021 21:16) (52 - 68)  BP: 152/91 (2021 21:16) (122/75 - 179/74)  BP(mean): --  RR: 18 (2021 21:16) (18 - 18)  SpO2: 100% (2021 21:16) (91% - 100%)    I&O's Detail    2021 07:01  -  2021 07:00  --------------------------------------------------------  IN:    dextrose 5%: 1943 mL  Total IN: 1943 mL    OUT:    Indwelling Catheter - Urethral (mL): 900 mL  Total OUT: 900 mL    Total NET: 1043 mL      2021 07:01  -  2021 21:22  --------------------------------------------------------  IN:    Oral Fluid: 10 mL  Total IN: 10 mL    OUT:    Indwelling Catheter - Urethral (mL): 1200 mL  Total OUT: 1200 mL    Total NET: -1190 mL        PHYSICAL EXAM:  GEN: confused   HEENT: dryMM  NECK supple no jvd  CV: RRR s1s2  LUNGS: b/l CTA  ABD: + soft,   EXT: no edema  neuro : confused. not following commands garbled speech  Simons + yellow urine     LABS:        146    |  120    |  120    ----------------------------<  92        2021 10:33  4.6     |  18     |  3.21     152    |  125    |  135    ----------------------------<  113       2021 20:08  4.6     |  18     |  3.35     152    |  127    |  139    ----------------------------<  110       2021 10:08  5.1     |  17     |  3.56     Ca    10.5       2021 10:33  Albumin 1.4  Corrected Ca   PTH 32 with Ca 11.1 on      Phos  3.7       2021 10:33  Phos  4.2       2021 10:08    Mg     2.4       2021 10:33  Mg     2.6       2021 20:08         Urinalysis Basic - ( 2021 15:21 )    Color: Fabby / Appearance: Slightly Turbid / S.020 / pH: x  Gluc: x / Ketone: Negative  / Bili: Negative / Urobili: Negative mg/dL   Blood: x / Protein: 100 mg/dL / Nitrite: Negative   Leuk Esterase: Small / RBC: >50 /HPF / WBC 26-50   Sq Epi: x / Non Sq Epi: Occasional / Bacteria: Moderate      Magnesium, Serum: 2.8 mg/dL ( @ 13:06)  Phosphorus Level, Serum: 5.5 mg/dL ( @ 13:06)

## 2021-01-24 LAB
24R-OH-CALCIDIOL SERPL-MCNC: 24.3 NG/ML — LOW (ref 30–80)
ANION GAP SERPL CALC-SCNC: 9 MMOL/L — SIGNIFICANT CHANGE UP (ref 5–17)
BUN SERPL-MCNC: 113 MG/DL — HIGH (ref 7–23)
CALCIUM SERPL-MCNC: 10.4 MG/DL — HIGH (ref 8.5–10.1)
CHLORIDE SERPL-SCNC: 115 MMOL/L — HIGH (ref 96–108)
CO2 SERPL-SCNC: 19 MMOL/L — LOW (ref 22–31)
CREAT ?TM UR-MCNC: 39 MG/DL — SIGNIFICANT CHANGE UP
CREAT SERPL-MCNC: 2.89 MG/DL — HIGH (ref 0.5–1.3)
CULTURE RESULTS: SIGNIFICANT CHANGE UP
CULTURE RESULTS: SIGNIFICANT CHANGE UP
GLUCOSE SERPL-MCNC: 93 MG/DL — SIGNIFICANT CHANGE UP (ref 70–99)
OSMOLALITY UR: 443 MOSM/KG — SIGNIFICANT CHANGE UP (ref 50–1200)
POTASSIUM SERPL-MCNC: 4.4 MMOL/L — SIGNIFICANT CHANGE UP (ref 3.5–5.3)
POTASSIUM SERPL-SCNC: 4.4 MMOL/L — SIGNIFICANT CHANGE UP (ref 3.5–5.3)
PROT SERPL-MCNC: 5.3 G/DL — LOW (ref 6–8.3)
PROT SERPL-MCNC: 5.3 G/DL — LOW (ref 6–8.3)
SODIUM SERPL-SCNC: 143 MMOL/L — SIGNIFICANT CHANGE UP (ref 135–145)
SODIUM UR-SCNC: 66 MMOL/L — SIGNIFICANT CHANGE UP
SPECIMEN SOURCE: SIGNIFICANT CHANGE UP
SPECIMEN SOURCE: SIGNIFICANT CHANGE UP

## 2021-01-24 PROCEDURE — 95816 EEG AWAKE AND DROWSY: CPT | Mod: 26

## 2021-01-24 PROCEDURE — 99223 1ST HOSP IP/OBS HIGH 75: CPT

## 2021-01-24 PROCEDURE — 99233 SBSQ HOSP IP/OBS HIGH 50: CPT

## 2021-01-24 RX ADMIN — CEFEPIME 1000 MILLIGRAM(S): 1 INJECTION, POWDER, FOR SOLUTION INTRAMUSCULAR; INTRAVENOUS at 22:49

## 2021-01-24 RX ADMIN — DORZOLAMIDE HYDROCHLORIDE TIMOLOL MALEATE 1 DROP(S): 20; 5 SOLUTION/ DROPS OPHTHALMIC at 09:38

## 2021-01-24 RX ADMIN — TAMSULOSIN HYDROCHLORIDE 0.4 MILLIGRAM(S): 0.4 CAPSULE ORAL at 22:49

## 2021-01-24 RX ADMIN — CALCITONIN SALMON 290 INTERNATIONAL UNIT(S): 200 INJECTION, SOLUTION INTRAMUSCULAR at 22:48

## 2021-01-24 RX ADMIN — HEPARIN SODIUM 5000 UNIT(S): 5000 INJECTION INTRAVENOUS; SUBCUTANEOUS at 09:40

## 2021-01-24 RX ADMIN — HEPARIN SODIUM 5000 UNIT(S): 5000 INJECTION INTRAVENOUS; SUBCUTANEOUS at 22:49

## 2021-01-24 RX ADMIN — LATANOPROST 1 DROP(S): 0.05 SOLUTION/ DROPS OPHTHALMIC; TOPICAL at 22:49

## 2021-01-24 RX ADMIN — DORZOLAMIDE HYDROCHLORIDE TIMOLOL MALEATE 1 DROP(S): 20; 5 SOLUTION/ DROPS OPHTHALMIC at 22:49

## 2021-01-24 RX ADMIN — SODIUM CHLORIDE 100 MILLILITER(S): 9 INJECTION, SOLUTION INTRAVENOUS at 12:41

## 2021-01-24 RX ADMIN — CEFEPIME 1000 MILLIGRAM(S): 1 INJECTION, POWDER, FOR SOLUTION INTRAMUSCULAR; INTRAVENOUS at 09:39

## 2021-01-24 RX ADMIN — CALCITONIN SALMON 290 INTERNATIONAL UNIT(S): 200 INJECTION, SOLUTION INTRAMUSCULAR at 11:44

## 2021-01-24 RX ADMIN — Medication 81 MILLIGRAM(S): at 09:41

## 2021-01-24 RX ADMIN — SODIUM CHLORIDE 100 MILLILITER(S): 9 INJECTION, SOLUTION INTRAVENOUS at 05:14

## 2021-01-24 NOTE — PROGRESS NOTE ADULT - ASSESSMENT
89 you with recent NSTEMI transferred from SNF with lethargy.  Found to be in CRISTELA with hypernatremia due to dehydration in presence of ace   Noted with hypercalcemia  Pna with leukocytosis   AMS     CRISTELA - mutifactorial - Cr improving    prerenal ? +/- ATN sec to Hyerpcalcemia renal vasoconstriction on ACE     Obstructive Uropathy with bilat hydro and blader retention - Simons in place   Hypernatremia slowly improving   continue d5W    good UOP  - continue I and O's w catheter    PTH 32 - normal w hypercalcemia - not supressed - subclinical HPT?     check SPEP, FLC, IF for completeness ,PTH rp    malignancy ? age appropriate workup    Corrected Ca 12.6  calcitonin x 4 doses    check urine lytes/osm     AMS - ? acute    Neuro workup    medicine evaluation       Palliative care followimg    Dr Ruiz et al to resume care Monday

## 2021-01-24 NOTE — PROGRESS NOTE ADULT - SUBJECTIVE AND OBJECTIVE BOX
COVERING FOR DR Carmichael       89 M with PMH of Afib, not currently on AC's, BPH,  post hospitalization s/p NSTEMI d/c to subacute rehab presenting to the ED from rehab with AMS, SOB. Per rehab, patient was found  in bed mumbling, not making sense with associated SOB. Sent patient to the ED for further evaluation. Pt is on baby ASA. Unable to obtain full HPI secondary to patients AMS.   hx from chart  renal eval for  pt admitted for yang with hypernatremia   hx of ? ckd with recent scr of 1.5    Remains altered MS dean epstein speech - responded to name and attempted to make eye contact      PAST MEDICAL & SURGICAL HISTORY:  Atrial fibrillation  Macular degeneration  History of BPH  Glaucoma  Inguinal hernia  left  History of prostate surgery  2019  S/P ORIF (open reduction internal fixation) fracture  right leg     FAMILY HISTORY:    MEDICATIONS  (STANDING):  aspirin enteric coated 81 milliGRAM(s) Oral daily  cefepime  Injectable. 1000 milliGRAM(s) IV Push every 12 hours  dextrose 5%. 1000 milliLiter(s) (100 mL/Hr) IV Continuous <Continuous>  dorzolamide 2%/timolol 0.5% Ophthalmic Solution 1 Drop(s) Both EYES two times a day  heparin   Injectable 5000 Unit(s) SubCutaneous every 12 hours  latanoprost 0.005% Ophthalmic Solution 1 Drop(s) Both EYES at bedtime  tamsulosin 0.4 milliGRAM(s) Oral at bedtime    MEDICATIONS  (PRN):  acetaminophen   Tablet .. 650 milliGRAM(s) Oral every 6 hours PRN Temp greater or equal to 38.5C (101.3F), Mild Pain (1 - 3)  acetaminophen  Suppository .. 650 milliGRAM(s) Rectal every 6 hours PRN Temp greater or equal to 38.5C (101.3F)  aluminum hydroxide/magnesium hydroxide/simethicone Suspension 30 milliLiter(s) Oral every 4 hours PRN Dyspepsia  ondansetron Injectable 4 milliGRAM(s) IV Push every 6 hours PRN Nausea        Allergies    No Known Allergies    Intolerances        I&O's Summary      Home Medications:  acetaminophen 500 mg oral tablet: 1 tab(s) orally every 4 hours, As Needed (2021 15:45)  dorzolamide-timolol 2.23%-0.68% ophthalmic solution: 1 drop(s) in each eye 2 times a day (2021 15:45)  ipratropium-albuterol 0.5 mg-2.5 mg/3 mLinhalation solution: 3 milliliter(s) inhaled 4 times a day, As Needed (2021 15:45)  latanoprost 0.005% ophthalmic solution: 1 drop(s) to each affected eye once a day (in the evening) (2021 15:45)  lisinopril 10 mg oral tablet: 1 tab(s) orally once a day (2021 15:45)        Vital Signs Last 24 Hrs  T(C): 36 (2021 21:10), Max: 36.4 (2021 17:55)  T(F): 96.8 (2021 21:10), Max: 97.5 (2021 17:55)  HR: 66 (2021 21:10) (57 - 72)  BP: 157/84 (2021 21:10) (141/56 - 159/67)  BP(mean): --  RR: 18 (2021 21:10) (18 - 18)  SpO2: 100% (2021 21:10) (97% - 100%)    I&O's Detail    2021 07:01  -  2021 07:00  --------------------------------------------------------  IN:    dextrose 5%: 1000 mL    Oral Fluid: 10 mL  Total IN: 1010 mL    OUT:    Indwelling Catheter - Urethral (mL): 1800 mL  Total OUT: 1800 mL    Total NET: -790 mL      2021 07:01  -  2021 23:04  --------------------------------------------------------  IN:    dextrose 5%: 1000 mL  Total IN: 1000 mL    OUT:    Indwelling Catheter - Urethral (mL): 850 mL  Total OUT: 850 mL    Total NET: 150 mL        PHYSICAL EXAM:  GEN: confused   HEENT: dryMM  NECK supple no jvd  CV: RRR s1s2  LUNGS: b/l CTA  ABD: + soft,   EXT: no edema  neuro : confused. not following commands garbled speech  Simons + yellow urine     LABS:        143    |  115    |  113    ----------------------------<  93        2021 10:22  4.4     |  19     |  2.89     146    |  120    |  120    ----------------------------<  92        2021 10:33  4.6     |  18     |  3.21     152    |  125    |  135    ----------------------------<  113       2021 20:08  4.6     |  18     |  3.35     Ca    10.4       2021 10:22  Ca    10.5       2021 10:33    Phos  3.7       2021 10:33  Phos  4.2       2021 10:08    Mg     2.4       2021 10:33  Mg     2.6       2021 20:08    TPro  5.3    /  Alb  x      /  TBili  x      /        2021 10:22  DBili  x      /  AST  x      /  ALT  x      /  AlkPhos  x        TPro  6.3    /  Alb  1.4    /  TBili  1.0    /        2021 10:33  DBili  x      /  AST  22     /  ALT  25     /  AlkPhos  182        Ca    10.5       2021 10:33  Albumin 1.4  Corrected Ca   PTH 32 with Ca 11.1 on      Phos  3.7       2021 10:33  Phos  4.2       2021 10:08    Mg     2.4       2021 10:33  Mg     2.6       2021 20:08         Urinalysis Basic - ( 2021 15:21 )    Color: Fabby / Appearance: Slightly Turbid / S.020 / pH: x  Gluc: x / Ketone: Negative  / Bili: Negative / Urobili: Negative mg/dL   Blood: x / Protein: 100 mg/dL / Nitrite: Negative   Leuk Esterase: Small / RBC: >50 /HPF / WBC 26-50   Sq Epi: x / Non Sq Epi: Occasional / Bacteria: Moderate      Magnesium, Serum: 2.8 mg/dL ( @ 13:06)  Phosphorus Level, Serum: 5.5 mg/dL ( @ 13:06)

## 2021-01-24 NOTE — CONSULT NOTE ADULT - ASSESSMENT
89 year old man admitted with Severe Sepsis secondary to RLL pneumonia HCAP vs Aspiration Pneumonia, CRISTELA due to Sepsis + Obstructive Uropathy, hypernatremia and hypercalcemia likely related to dehydration.     Neurology consulted for altered mental status.  The patient does seem to have an element of aphasia, but it is difficult to discern how much is secondary to encephalopathy. No other focal findings are present.  Although renal function has improved somewhat, it is still significantly impaired beyond baseline (creatinine in July was 1.3-1.5).  Agree that MRI brain would be helpful but patient may need sedation to tolerate.   Will order EEG and if study is most suggestive of encephalopathy, can possibly defer MRI for now.    Discussed with Dr. Cox.

## 2021-01-24 NOTE — PROGRESS NOTE ADULT - ASSESSMENT
MEDICATIONS  (STANDING):  aspirin enteric coated 81 milliGRAM(s) Oral daily  calcitonin Injectable 290 International Unit(s) IntraMuscular every 12 hours  cefepime  Injectable. 1000 milliGRAM(s) IV Push every 12 hours  dextrose 5%. 1000 milliLiter(s) (100 mL/Hr) IV Continuous <Continuous>  dorzolamide 2%/timolol 0.5% Ophthalmic Solution 1 Drop(s) Both EYES two times a day  heparin   Injectable 5000 Unit(s) SubCutaneous every 12 hours  latanoprost 0.005% Ophthalmic Solution 1 Drop(s) Both EYES at bedtime  tamsulosin 0.4 milliGRAM(s) Oral at bedtime    MEDICATIONS  (PRN):  acetaminophen   Tablet .. 650 milliGRAM(s) Oral every 6 hours PRN Temp greater or equal to 38.5C (101.3F), Mild Pain (1 - 3)  acetaminophen  Suppository .. 650 milliGRAM(s) Rectal every 6 hours PRN Temp greater or equal to 38.5C (101.3F)  aluminum hydroxide/magnesium hydroxide/simethicone Suspension 30 milliLiter(s) Oral every 4 hours PRN Dyspepsia  ondansetron Injectable 4 milliGRAM(s) IV Push every 6 hours PRN Nausea      ASSESSMENT:    Severe Sepsis secondary to RLL pneumonia HCAP vs Aspiration Pneumonia  CRISTELA due to Sepsis + Obstructive Uropathy  Hypernatremia due to Dehydration  Hypercalcemia likely due to dehdyration  Afib (not on AC; deferred by patient and family in outpatient setting)  Dementia  CAD  BPH      PLAN    Admitted to medicine  IVF with D5. Continue to monitor Na closely and avoid rapid correction  Strict I/Os s/p Simons insertion with >1400cc in bladder (1/20 evening). Start flomax.   Nephrology consulted  Avoid nephrotoxic agents  Empiric ABX. Cultures NGTD.   SLP evaluation->Diet adjusted accordingly  Palliative care consulted on admission as patient's multiple comorbidities and presenting picture and guarded condition, patient may benefit from discussing comfort care/GOC  Discussed possiblity of neurologic event with family (1/22) including negative CT however MRI more definitive but likely will require sedation which can cause more harm than good in patient's current state. Including empirically starting full dose AC for TE prevention in afib. Daughter agreeabel to hold full dose AC and correct metabolic disturbances first and will proceed with neurologic assessment as patient become more stable and can tolerate MRI.  1/23: improved Cr and Na with no improvment in mental status and even more lethargy. ->Neurology consulted. EEG ordered      DVT Prophylaxis: Heparin subq  Discussed plan of care with patient's daughter Ayesha [phone] and Palliative care.

## 2021-01-24 NOTE — PROGRESS NOTE ADULT - SUBJECTIVE AND OBJECTIVE BOX
SUBJECTIVE: Limited ROS/CC as patient is AMS.     1/20-1/21:Overnight Bladder US ~1600cc in bladder -> Simons insertion with 1400cc drained.   1/22: More energy; Still confused and speech is still off. Unsure whether expressive aphasia is related to neurologic event (patient has risks including Afib not on AC) vs Metabolic. Limited ROS/CC  1:23: more lethargic today. Producing less words. Urine output adequate. Cr and Na improving.   1/24: some improvement; speech not coherent; no apparent new focal deficit; Patient resting comfortabley    FROM H&P:    "89 M with PMH of Afib, not currently on AC's, BPH,  post hospitalization s/p NSTEMI d/c to subacute rehab presenting to the ED from rehab with AMS, SOB. Per rehab, patient was found today in bed mumbling, not making sense with associated SOB. Sent patient to the ED for further evaluation. Pt is on baby ASA. Unable to obtain full HPI secondary to patients AMS. Admit for HCAP."        PHYSICAL EXAM:    T(C): 36.2 (01-24-21 @ 08:24), Max: 36.3 (01-23-21 @ 18:27)  HR: 57 (01-24-21 @ 08:24) (57 - 68)  BP: 159/67 (01-24-21 @ 08:24) (152/70 - 159/67)  RR: 18 (01-24-21 @ 08:24) (18 - 18)  SpO2: 97% (01-24-21 @ 08:24) (91% - 100%)  General: AAOx0; NAD; lethargic  Head: AT/NC  ENT: Dry mucous membranes No Injury  Neck: Non-tender; No JVD  CVS: RRR, S1&S2, No murmur, b/L LE Edema  Respiratory: Decreased bibasilar breath sounds; Normal Respiratory Effort  Abdomen/GI: Soft, non-tender, non-distended, normal bowel sounds  : No bladder distention, Simons in place with dark concentrated urine  Extremites: No cyanosis, No clubbing, LE Edema  Neuro: AAOx1, Limited exam as patient not cooperative.   Psych: Unable to evaluate  Skin: Clean, Dry and Intact                            12.5   16.24 )-----------( 251      ( 23 Jan 2021 10:33 )             39.1     01-24    143  |  115<H>  |  113<H>  ----------------------------<  93  4.4   |  19<L>  |  2.89<H>    Ca    10.4<H>      24 Jan 2021 10:22  Phos  3.7     01-23  Mg     2.4     01-23    TPro  5.3<L>  /  Alb  x   /  TBili  x   /  DBili  x   /  AST  x   /  ALT  x   /  AlkPhos  x   01-24    COVID-19 PCR: NotDetec (20 Jan 2021 22:07)  COVID-19 PCR: NotDetec (19 Jan 2021 15:21)    CAPILLARY BLOOD GLUCOSE                                12.5   16.24 )-----------( 251      ( 23 Jan 2021 10:33 )             39.1     01-23    146<H>  |  120<H>  |  120<H>  ----------------------------<  92  4.6   |  18<L>  |  3.21<H>    Ca    10.5<H>      23 Jan 2021 10:33  Phos  3.7     01-23  Mg     2.4     01-23    TPro  6.3  /  Alb  1.4<L>  /  TBili  1.0  /  DBili  x   /  AST  22  /  ALT  25  /  AlkPhos  182<H>  01-23    COVID-19 PCR: NotDetec (20 Jan 2021 22:07)  COVID-19 PCR: NotDetec (19 Jan 2021 15:21)    CAPILLARY BLOOD GLUCOSE                                13.6   19.05 )-----------( 275      ( 22 Jan 2021 10:08 )             43.1     01-22    152<H>  |  127<H>  |  139<H>  ----------------------------<  110<H>  5.1   |  17<L>  |  3.56<H>    Ca    11.8<H>      22 Jan 2021 10:08  Phos  4.2     01-22  Mg     2.8     01-22    TPro  6.7  /  Alb  1.6<L>  /  TBili  1.2  /  DBili  x   /  AST  29  /  ALT  27  /  AlkPhos  189<H>  01-22    COVID-19 PCR: NotDetec (20 Jan 2021 22:07)  COVID-19 PCR: NotDetec (19 Jan 2021 15:21)    CAPILLARY BLOOD GLUCOSE                                  13.0   21.26 )-----------( 289      ( 21 Jan 2021 08:48 )             40.8     01-21    156<H>  |  128<H>  |  151<H>  ----------------------------<  121<H>  4.7   |  18<L>  |  4.07<H>    Ca    10.8<H>      21 Jan 2021 08:48  Phos  4.9     01-21  Mg     2.9     01-21    TPro  6.2  /  Alb  1.4<L>  /  TBili  1.3<H>  /  DBili  x   /  AST  30  /  ALT  29  /  AlkPhos  205<H>  01-21    COVID-19 PCR: NotDetec (20 Jan 2021 22:07)  COVID-19 PCR: NotDetec (19 Jan 2021 15:21)    CAPILLARY BLOOD GLUCOSE          Culture - Blood (collected 19 Jan 2021 15:33)  Source: .Blood None  Preliminary Report (20 Jan 2021 20:01):    No growth to date.    Culture - Blood (collected 19 Jan 2021 15:33)  Source: .Blood None  Preliminary Report (20 Jan 2021 20:01):    No growth to date.    Culture - Urine (collected 19 Jan 2021 15:21)  Source: .Urine None  Final Report (20 Jan 2021 14:39):    No growth                            13.8   23.11 )-----------( 292      ( 20 Jan 2021 13:06 )             43.2     01-20    150<H>  |  123<H>  |  148<H>  ----------------------------<  76  4.8   |  19<L>  |  4.16<H>    Ca    11.1<H>      20 Jan 2021 13:06  Phos  5.5     01-20  Mg     2.8     01-20    TPro  6.9  /  Alb  1.8<L>  /  TBili  2.1<H>  /  DBili  x   /  AST  63<H>  /  ALT  45  /  AlkPhos  266<H>  01-19    COVID-19 PCR: NotDetec (19 Jan 2021 15:21)    CAPILLARY BLOOD GLUCOSE          Culture - Urine (collected 19 Jan 2021 15:21)  Source: .Urine None  Final Report (20 Jan 2021 14:39):    No growth        < from: CT Head No Cont (01.19.21 @ 14:38) >  IMPRESSION:  Limited exam due to motion    No acute intracranial hemorrhage or acute territorial infarct.  If symptoms persist, follow-up MRI exam recommended.    < end of copied text >  < from: Xray Chest 1 View- PORTABLE-Urgent (Xray Chest 1 View- PORTABLE-Urgent .) (01.19.21 @ 14:18) >    IMPRESSION: Vague right perihilar infiltrate.    < end of copied text >    < from: CT Head No Cont (01.21.21 @ 09:55) >  IMPRESSION:    -No acute intracranial findings.    < end of copied text >  < from: US Kidney and Bladder (01.20.21 @ 19:10) >  IMPRESSION:    Overdistended, 1300 cc urinary bladder volume causing mild bilateral hydronephrosis.    < end of copied text >    I personally reviewed vitals, labs, orders and imaging

## 2021-01-25 LAB
ANION GAP SERPL CALC-SCNC: 9 MMOL/L — SIGNIFICANT CHANGE UP (ref 5–17)
BUN SERPL-MCNC: 107 MG/DL — HIGH (ref 7–23)
CALCIUM SERPL-MCNC: 10 MG/DL — SIGNIFICANT CHANGE UP (ref 8.5–10.1)
CHLORIDE SERPL-SCNC: 112 MMOL/L — HIGH (ref 96–108)
CO2 SERPL-SCNC: 18 MMOL/L — LOW (ref 22–31)
CREAT SERPL-MCNC: 2.71 MG/DL — HIGH (ref 0.5–1.3)
GLUCOSE SERPL-MCNC: 88 MG/DL — SIGNIFICANT CHANGE UP (ref 70–99)
KAPPA LC SER QL IFE: 11.01 MG/DL — HIGH (ref 0.33–1.94)
KAPPA/LAMBDA FREE LIGHT CHAIN RATIO, SERUM: 0.84 RATIO — SIGNIFICANT CHANGE UP (ref 0.26–1.65)
LAMBDA LC SER QL IFE: 13.14 MG/DL — HIGH (ref 0.57–2.63)
POTASSIUM SERPL-MCNC: 4.1 MMOL/L — SIGNIFICANT CHANGE UP (ref 3.5–5.3)
POTASSIUM SERPL-SCNC: 4.1 MMOL/L — SIGNIFICANT CHANGE UP (ref 3.5–5.3)
SODIUM SERPL-SCNC: 139 MMOL/L — SIGNIFICANT CHANGE UP (ref 135–145)

## 2021-01-25 PROCEDURE — 99233 SBSQ HOSP IP/OBS HIGH 50: CPT

## 2021-01-25 PROCEDURE — 99232 SBSQ HOSP IP/OBS MODERATE 35: CPT

## 2021-01-25 RX ORDER — SODIUM CHLORIDE 9 MG/ML
1000 INJECTION, SOLUTION INTRAVENOUS
Refills: 0 | Status: DISCONTINUED | OUTPATIENT
Start: 2021-01-25 | End: 2021-01-29

## 2021-01-25 RX ADMIN — SODIUM CHLORIDE 100 MILLILITER(S): 9 INJECTION, SOLUTION INTRAVENOUS at 13:51

## 2021-01-25 RX ADMIN — SODIUM CHLORIDE 100 MILLILITER(S): 9 INJECTION, SOLUTION INTRAVENOUS at 00:22

## 2021-01-25 RX ADMIN — Medication 81 MILLIGRAM(S): at 10:53

## 2021-01-25 RX ADMIN — HEPARIN SODIUM 5000 UNIT(S): 5000 INJECTION INTRAVENOUS; SUBCUTANEOUS at 10:53

## 2021-01-25 RX ADMIN — LATANOPROST 1 DROP(S): 0.05 SOLUTION/ DROPS OPHTHALMIC; TOPICAL at 21:32

## 2021-01-25 RX ADMIN — CEFEPIME 1000 MILLIGRAM(S): 1 INJECTION, POWDER, FOR SOLUTION INTRAMUSCULAR; INTRAVENOUS at 21:30

## 2021-01-25 RX ADMIN — TAMSULOSIN HYDROCHLORIDE 0.4 MILLIGRAM(S): 0.4 CAPSULE ORAL at 21:31

## 2021-01-25 RX ADMIN — CEFEPIME 1000 MILLIGRAM(S): 1 INJECTION, POWDER, FOR SOLUTION INTRAMUSCULAR; INTRAVENOUS at 10:56

## 2021-01-25 RX ADMIN — DORZOLAMIDE HYDROCHLORIDE TIMOLOL MALEATE 1 DROP(S): 20; 5 SOLUTION/ DROPS OPHTHALMIC at 21:30

## 2021-01-25 RX ADMIN — HEPARIN SODIUM 5000 UNIT(S): 5000 INJECTION INTRAVENOUS; SUBCUTANEOUS at 22:25

## 2021-01-25 RX ADMIN — SODIUM CHLORIDE 100 MILLILITER(S): 9 INJECTION, SOLUTION INTRAVENOUS at 23:20

## 2021-01-25 RX ADMIN — SODIUM CHLORIDE 100 MILLILITER(S): 9 INJECTION, SOLUTION INTRAVENOUS at 14:28

## 2021-01-25 RX ADMIN — CALCITONIN SALMON 290 INTERNATIONAL UNIT(S): 200 INJECTION, SOLUTION INTRAMUSCULAR at 13:40

## 2021-01-25 RX ADMIN — DORZOLAMIDE HYDROCHLORIDE TIMOLOL MALEATE 1 DROP(S): 20; 5 SOLUTION/ DROPS OPHTHALMIC at 10:57

## 2021-01-25 NOTE — PROGRESS NOTE ADULT - ASSESSMENT
89 you with recent NSTEMI transferred from SNF with lethargy.  Found to be in CRISTELA with hypernatremia due to dehydration in presence of ace-I  Noted with hypercalcemia   Pna with leukocytosis     PLAN   - change ivf to d5w and inc rate    fu uop currently incontinent    prn bladder scan   - pna abx   - fu trend of calcium level.  if no improvement with current fluid composition, may need to change fluid composition  - palliative discussion noted, to revisit on friday 1/21  Na elevated to 156, On D5W  CRISTELA improving  PNA on abx   hypercalcemia, cont IVF  check PTH r/o primary hyperparathyroidism    1/22  doubt hypercalcemia due to dehydration solely, since calcium up while Na and Creat improving  PTH pending.  If elevated, and primary hyper para suspected, may get cinecalcet 30 mg BID  If PTH suppressed may be due to malignancy, sarcoid etc   -will need other means to treat the underlying dz , calcitonin etc  Low K diet, K up to 5.1 on hydration  Simons draining good UO  Dr Simmons covering this weekend    1/25 SY  --CRISTELA /CKD :? baseline creat of 1.5. : renal function slowly improving.    --Hypernatremia : improved : change IVF to 1/2 NS to maintain current serum sodium level and for Calcium excretion:   --Hypercalcemia of unclear etiology : Free light chain Kappa/Lambda ratio wnl., follow up serum MANOLO.  --AMS : unclear if all due to metabolic encephalopathy : continue to monitor.

## 2021-01-25 NOTE — PROGRESS NOTE ADULT - ASSESSMENT
89y old Male with hx of Afib (not on AC), BPH, w/ recent NSTEMI at [facility] (found after pt admitted there for a fall) and was dc'd to [facility] on 1/13. Now admitted at  on 1/19 for AMS and SOB. Found to have CXR showing R perihilar infiltrate, COVID-19 negative, CTH negative for acute pathology, leukocytosis (wbc>30) and CRISTELA with Cr>4. Palliative Care consulted to assist with establishing GOC.     1) AMS  - daughter clarified for primary team on admission that pt does not have dementia  - likely encephalopathy/hypoactive delirium from underlying infection and metabolic abnormalities  - CTH negative--> repeat also negative  - however still with garbled speech which may be due to kidney dysfunction. ?MRI if does not improve with improving renal function  - neurology now also consulted   - eeg done with generalized background slowing c/w encephalopathy and no evidence of seizure  - concern for doing MRI due to likely need for anesthesia. Thus plan to wait until kidney function more normalized and pursue this is mentation remains poor  - fall and aspiration precautions    2) HCAP  - CXR showing infiltrate on R  - IV abx  - supplemental O2 as needed  - can also consider addition of mucinex for audible secretions  - COVID negative x2  - WBC improved    3) CRISTELA  - unclear, possibly do to poor po intake  - renal US showed retention of >1L--> reilly in place now draining well   - nephrology consult appreciated, fluids adjusted, monitoring  - Cr elevated>4, but slowly improving. Likely to continue improving with reilly placed  - avoid nephrotoxic agents as able  - also would consider that pt just had NSTEMI    4) Recent NSTEMI  - recently treated for NSTEMI at outside hospital  - ? echo  - ? cardio to follow up and ensure this is not contributing?    5) Debility  - PPS<40%  - speech and swallow eval appreciated, awaiting impression  - suggest nutrition eval given evidence of malnutrition on exam   - as per care coordination notes pt fully independent and living alone, with visits from daughter who helps with errands and appointments, pt does not drive  - PT consulted as pt is coming from rehab-->max assist, not following commands, MIKE recommended    6) Prognosis  - guarded  - pt with advanced age, significant comorbidities, including recent hospitalization for NSTEMI, now with evidence of multiorgan compromise, recent fall, PPS<40% at this time, albumin 1.8 with evidence of malnutrition on exam; pt at increased risk for complications    7) GOC/Advanced Directives  - pt does not currently have capacity for decision making, but according to EMR, pt was was cognitively intact though very [place] prior  - surrogate decision maker is daughter Ayesha Le [phone]  - MOLST on chart 1/19: DNR and DNI  - GOC meeting initiated 1/20- plan to give pt more time to see if he can improve, as desired plan is for MIKE and home. Follow up meeting held 1/22 to touch base/update leading to same goals. See GO note that will follow    Thank you for including us in Mr. Long's care. Will continue to follow with you.    Ridge Oakley MD  Palliative Care Attending

## 2021-01-25 NOTE — PROGRESS NOTE ADULT - SUBJECTIVE AND OBJECTIVE BOX
HPI: Pt seen and examined this am in follow up for sx, RN and nursing aid at bedside. Pt remains wakeful, but with garbled speech. Unable to gather history from pt. As per RN and aid, some minimal intake and very little, if any improvement in his ability to converse.       PAIN: no nonverbal signs of pain    DYSPNEA: no nonverbal signs of dyspnea      ROS:  Unable to gather 2/2 to ams      PHYSICAL EXAM:    Vital Signs Last 24 Hrs  T(C): 35.9 (25 Jan 2021 07:11), Max: 36.4 (24 Jan 2021 17:55)  T(F): 96.6 (25 Jan 2021 07:11), Max: 97.5 (24 Jan 2021 17:55)  HR: 64 (25 Jan 2021 07:11) (64 - 72)  BP: 167/81 (25 Jan 2021 07:11) (141/56 - 167/81)  RR: 18 (25 Jan 2021 07:11) (18 - 18)  SpO2: 98% (25 Jan 2021 07:11) (98% - 100%)  Daily     Daily     PPSV2: 30  %    General: Elderly male lying in bed, ill-appearing, awake and trying to respond but speech garbled, not yet following commands  Mental Status: answers to name, unable to discern further  HEENT: dmm, + temporal wasting  Lungs: dec at bases bl  Cardiac: + s1 s2 rrr  GI: soft nt nd +bs  : incontinent  MSK/skin: moves all extremities spontaneously, some edema in feet, some muscle and fat wasting noted in extremities  Neuro: limited by mentation, responds to touch, garbled speech    LABS:    01-25    139  |  112<H>  |  107<H>  ----------------------------<  88  4.1   |  18<L>  |  2.71<H>    Ca    10.0      25 Jan 2021 07:25    TPro  5.3<L>  /  Alb  x   /  TBili  x   /  DBili  x   /  AST  x   /  ALT  x   /  AlkPhos  x   01-24      Albumin: Albumin, Serum: 1.4 g/dL (01-23 @ 10:33)      Allergies    No Known Allergies    Intolerances      MEDICATIONS  (STANDING):  aspirin enteric coated 81 milliGRAM(s) Oral daily  calcitonin Injectable 290 International Unit(s) IntraMuscular every 12 hours  cefepime  Injectable. 1000 milliGRAM(s) IV Push every 12 hours  dextrose 5%. 1000 milliLiter(s) (100 mL/Hr) IV Continuous <Continuous>  dorzolamide 2%/timolol 0.5% Ophthalmic Solution 1 Drop(s) Both EYES two times a day  heparin   Injectable 5000 Unit(s) SubCutaneous every 12 hours  latanoprost 0.005% Ophthalmic Solution 1 Drop(s) Both EYES at bedtime  tamsulosin 0.4 milliGRAM(s) Oral at bedtime    MEDICATIONS  (PRN):  acetaminophen   Tablet .. 650 milliGRAM(s) Oral every 6 hours PRN Temp greater or equal to 38.5C (101.3F), Mild Pain (1 - 3)  acetaminophen  Suppository .. 650 milliGRAM(s) Rectal every 6 hours PRN Temp greater or equal to 38.5C (101.3F)  aluminum hydroxide/magnesium hydroxide/simethicone Suspension 30 milliLiter(s) Oral every 4 hours PRN Dyspepsia  ondansetron Injectable 4 milliGRAM(s) IV Push every 6 hours PRN Nausea      RADIOLOGY:    EXAM:  EEG AWAKE AND DROWSY    PROCEDURE DATE:  01/24/2021      EEG Summary/Classification:  This was an abnormal EEG study in the awake and drowsy states due to the presence of marked generalized slowing.    EEG Impression/Clinical Correlate:  The findings are suggestive of diffuse cerebral dysfunction/encephalopathy. No epileptiform activity was seen and no clinical events or seizures were recorded. Consider a repeat study if clinically indicated.    ________________________________________  Audra Jenkins MD  Attending Physician  Director of Epilepsy at [facility]

## 2021-01-25 NOTE — PROGRESS NOTE ADULT - ASSESSMENT
MEDICATIONS  (STANDING):  aspirin enteric coated 81 milliGRAM(s) Oral daily  cefepime  Injectable. 1000 milliGRAM(s) IV Push every 12 hours  dorzolamide 2%/timolol 0.5% Ophthalmic Solution 1 Drop(s) Both EYES two times a day  heparin   Injectable 5000 Unit(s) SubCutaneous every 12 hours  latanoprost 0.005% Ophthalmic Solution 1 Drop(s) Both EYES at bedtime  sodium chloride 0.45%. 1000 milliLiter(s) (100 mL/Hr) IV Continuous <Continuous>  tamsulosin 0.4 milliGRAM(s) Oral at bedtime    MEDICATIONS  (PRN):  acetaminophen   Tablet .. 650 milliGRAM(s) Oral every 6 hours PRN Temp greater or equal to 38.5C (101.3F), Mild Pain (1 - 3)  acetaminophen  Suppository .. 650 milliGRAM(s) Rectal every 6 hours PRN Temp greater or equal to 38.5C (101.3F)  aluminum hydroxide/magnesium hydroxide/simethicone Suspension 30 milliLiter(s) Oral every 4 hours PRN Dyspepsia  LORazepam     Tablet 0.25 milliGRAM(s) Oral once PRN MRI premed  ondansetron Injectable 4 milliGRAM(s) IV Push every 6 hours PRN Nausea        ASSESSMENT:    Severe Sepsis secondary to RLL pneumonia HCAP vs Aspiration Pneumonia  CRISTELA due to Sepsis + Obstructive Uropathy  Hypernatremia due to Dehydration. Resolved  Hypercalcemia likely due to dehdyration  Afib (not on AC; deferred by patient and family in outpatient setting)  Dementia  CAD  BPH      PLAN    Admitted to medicine  IVF with D5. Continue to monitor Na closely and avoid rapid correction. Improvement and resolution of Hypernatremia.  Continue to monitor Electrolytes and Cr. Correct electrolytes accordinlgy.   Strict I/Os s/p Simons insertion with >1400cc in bladder (1/20 evening). Start flomax.   Nephrology consulted  IVF as needed for fluid balance  Avoid nephrotoxic agents  Empiric ABX. Cultures NGTD.   SLP evaluation->Diet adjusted accordingly  Palliative care consulted on admission as patient's multiple comorbidities and presenting picture and guarded condition, patient may benefit from discussing comfort care/GOC  Discussed possiblity of neurologic event with family (1/22) including negative CT however MRI more definitive but likely will require sedation which can cause more harm than good in patient's current state. Including empirically starting full dose AC for TE prevention in afib. Daughter agreeable to hold full dose AC and correct metabolic disturbances first and will proceed with neurologic assessment as patient become more stable and can tolerate MRI.  1/23: improved Cr and Na with no improvement in mental status and even more lethargy. ->Neurology consulted. EEG (resulted as non-specific diffuse encephalopathy)  In the setting of no drastic improvement despite improvement in Cr and Na, order MRI head wo con. Mild ativan for sedation. Monitor for respiratory depression.   Also BUN still>100 despite improvement urine output and Cr->Uremia still contributing to AMS? Will discuss with nephrology      DVT Prophylaxis: Heparin subq  Discussed plan of care with patient's daughter Ayesha [phone] and Palliative care.

## 2021-01-25 NOTE — PROGRESS NOTE ADULT - SUBJECTIVE AND OBJECTIVE BOX
Interval History:  1/25/21: Patient remains confused.    MEDICATIONS  (STANDING):  aspirin enteric coated 81 milliGRAM(s) Oral daily  calcitonin Injectable 290 International Unit(s) IntraMuscular every 12 hours  cefepime  Injectable. 1000 milliGRAM(s) IV Push every 12 hours  dextrose 5%. 1000 milliLiter(s) (100 mL/Hr) IV Continuous <Continuous>  dorzolamide 2%/timolol 0.5% Ophthalmic Solution 1 Drop(s) Both EYES two times a day  heparin   Injectable 5000 Unit(s) SubCutaneous every 12 hours  latanoprost 0.005% Ophthalmic Solution 1 Drop(s) Both EYES at bedtime  tamsulosin 0.4 milliGRAM(s) Oral at bedtime    MEDICATIONS  (PRN):  acetaminophen   Tablet .. 650 milliGRAM(s) Oral every 6 hours PRN Temp greater or equal to 38.5C (101.3F), Mild Pain (1 - 3)  acetaminophen  Suppository .. 650 milliGRAM(s) Rectal every 6 hours PRN Temp greater or equal to 38.5C (101.3F)  aluminum hydroxide/magnesium hydroxide/simethicone Suspension 30 milliLiter(s) Oral every 4 hours PRN Dyspepsia  ondansetron Injectable 4 milliGRAM(s) IV Push every 6 hours PRN Nausea      Allergies    No Known Allergies    Intolerances        PHYSICAL EXAM:  Vital Signs Last 24 Hrs  T(F): 96.6 (01-25-21 @ 07:11)  HR: 64 (01-25-21 @ 07:11)  BP: 167/81 (01-25-21 @ 07:11)  RR: 18 (01-25-21 @ 07:11)    GENERAL: NAD, well-groomed, well-developed  HEAD:  Atraumatic, Normocephalic  Neuro:  Sleeping, but easily arousable. Becomes attentive. Able to follow simple commands. However, with paucity of speech and unable to repeat phrases or name objects.   CN: PERRL, EOMI, no nystagmus, no facial weakness, tongue protrudes in the midline  Motor: not cooperative with confrontation testing, but moves all extremities  Sens: withdraws to noxious stimuli in all four extremiteis  Reflexes: Symmetric and normal . BJ 1, BR 1, KJ 1, plantars flexor b/l  Coord:  unable to cooperate. No involuntary movements noted.      LABS:                        12.5   16.24 )-----------( 251      ( 23 Jan 2021 10:33 )             39.1     01-25    139  |  112<H>  |  107<H>  ----------------------------<  88  4.1   |  18<L>  |  2.71<H>    Ca    10.0      25 Jan 2021 07:25  Phos  3.7     01-23  Mg     2.4     01-23    TPro  5.3<L>  /  Alb  x   /  TBili  x   /  DBili  x   /  AST  x   /  ALT  x   /  AlkPhos  x   01-24          RADIOLOGY & ADDITIONAL STUDIES:  CT head 1/19/21:  Limited exam due to motion  No acute intracranial hemorrhage or acute territorial infarct.  If symptoms persist, follow-up MRI exam recommended.    CT head 1/21/21: no acute intracranial findings    EEG 1/24/21: Moderate to marked generalized slowing

## 2021-01-25 NOTE — PROGRESS NOTE ADULT - ASSESSMENT
89 year old man admitted with Severe Sepsis secondary to RLL pneumonia HCAP vs Aspiration Pneumonia, CRISTELA due to Sepsis + Obstructive Uropathy, hypernatremia and hypercalcemia likely related to dehydration.     Neurology consulted for altered mental status.  The patient does seem to have an element of aphasia, but it is difficult to discern how much is secondary to encephalopathy. No other focal findings are present.  Although renal function has improved somewhat, it is still significantly impaired beyond baseline (creatinine in July was 1.3-1.5).  Agree that MRI brain would be helpful but patient may need sedation to tolerate. Can attempt MRI as patient is calm.  EEG suggestive of encephalopathy.  If there is an alternative antibiotic to cefepime, consider changing as cefepime can contribute to altered mental status, particularly in the setting of renal failure.

## 2021-01-25 NOTE — PROGRESS NOTE ADULT - SUBJECTIVE AND OBJECTIVE BOX
NEPHROLOGY INTERVAL HPI/OVERNIGHT EVENTS:    Date of Service: 01-25-21 @ 13:48    1/25--No acute events overnight.  CRISTELA slowly improving.  No hx of Dementia per pt's daughter.  Pt awake but unable to answer any questions with garbled speech.    HPI:  89 M with PMH of Afib, not currently on AC's, BPH,  post hospitalization s/p NSTEMI d/c to subacute rehab presenting to the ED from rehab with AMS, SOB. Per rehab, patient was found today in bed mumbling, not making sense with associated SOB. Sent patient to the ED for further evaluation. Pt is on baby ASA. Unable to obtain full HPI secondary to patients AMS. Admit for HCAP    hx fromchart  pt admitted for cristela with hypernatremia   hx of ? ckd with recent scr of 1.5    PAST MEDICAL & SURGICAL HISTORY:  Atrial fibrillation  Macular degeneration  History of BPH  Glaucoma  Inguinal hernia  left  History of prostate surgery  2019  S/P ORIF (open reduction internal fixation) fracture  right leg 1988    MEDICATIONS  (STANDING):  aspirin enteric coated 81 milliGRAM(s) Oral daily  cefepime  Injectable. 1000 milliGRAM(s) IV Push every 12 hours  dextrose 5%. 1000 milliLiter(s) (100 mL/Hr) IV Continuous <Continuous>  dorzolamide 2%/timolol 0.5% Ophthalmic Solution 1 Drop(s) Both EYES two times a day  heparin   Injectable 5000 Unit(s) SubCutaneous every 12 hours  latanoprost 0.005% Ophthalmic Solution 1 Drop(s) Both EYES at bedtime  tamsulosin 0.4 milliGRAM(s) Oral at bedtime    MEDICATIONS  (PRN):  acetaminophen   Tablet .. 650 milliGRAM(s) Oral every 6 hours PRN Temp greater or equal to 38.5C (101.3F), Mild Pain (1 - 3)  acetaminophen  Suppository .. 650 milliGRAM(s) Rectal every 6 hours PRN Temp greater or equal to 38.5C (101.3F)  aluminum hydroxide/magnesium hydroxide/simethicone Suspension 30 milliLiter(s) Oral every 4 hours PRN Dyspepsia  ondansetron Injectable 4 milliGRAM(s) IV Push every 6 hours PRN Nausea    Vital Signs Last 24 Hrs  T(C): 35.9 (25 Jan 2021 07:11), Max: 36.4 (24 Jan 2021 17:55)  T(F): 96.6 (25 Jan 2021 07:11), Max: 97.5 (24 Jan 2021 17:55)  HR: 64 (25 Jan 2021 07:11) (64 - 72)  BP: 167/81 (25 Jan 2021 07:11) (141/56 - 167/81)  BP(mean): --  RR: 18 (25 Jan 2021 07:11) (18 - 18)  SpO2: 98% (25 Jan 2021 07:11) (98% - 100%)    01-24 @ 07:01 - 01-25 @ 07:00  --------------------------------------------------------  IN: 2000 mL / OUT: 1625 mL / NET: 375 mL    01-25 @ 07:01 - 01-25 @ 13:48  --------------------------------------------------------  IN: 0 mL / OUT: 475 mL / NET: -475 mL    PHYSICAL EXAM:  GENERAL: comfortable.  CHEST/LUNG: Fair air entry  HEART: S1S2 RRR  ABDOMEN: soft  EXTREMITIES: no edema  SKIN:     LABS:    01-25    139  |  112<H>  |  107<H>  ----------------------------<  88  4.1   |  18<L>  |  2.71<H>    Ca    10.0      25 Jan 2021 07:25    TPro  5.3<L>  /  Alb  x   /  TBili  x   /  DBili  x   /  AST  x   /  ALT  x   /  AlkPhos  x   01-24                RADIOLOGY & ADDITIONAL TESTS:

## 2021-01-25 NOTE — PROGRESS NOTE ADULT - SUBJECTIVE AND OBJECTIVE BOX
SUBJECTIVE: Limited ROS/CC as patient is AMS.     1/20-1/21:Overnight Bladder US ~1600cc in bladder -> Simons insertion with 1400cc drained.   1/22: More energy; Still confused and speech is still off. Unsure whether expressive aphasia is related to neurologic event (patient has risks including Afib not on AC) vs Metabolic. Limited ROS/CC  1:23: more lethargic today. Producing less words. Urine output adequate. Cr and Na improving.   1/24: some improvement; speech not coherent; no apparent new focal deficit; Patient resting comfortabley  1/25: No significant change; speech not coherent; no apparent new focal deficit; Patient resting comfortabley; follow minimal commands    FROM H&P:    "89 M with PMH of Afib, not currently on AC's, BPH,  post hospitalization s/p NSTEMI d/c to subacute rehab presenting to the ED from rehab with AMS, SOB. Per rehab, patient was found today in bed mumbling, not making sense with associated SOB. Sent patient to the ED for further evaluation. Pt is on baby ASA. Unable to obtain full HPI secondary to patients AMS. Admit for HCAP."        PHYSICAL EXAM:    T(C): 35.9 (01-25-21 @ 07:11), Max: 36.4 (01-24-21 @ 17:55)  HR: 64 (01-25-21 @ 07:11) (64 - 72)  BP: 167/81 (01-25-21 @ 07:11) (141/56 - 167/81)  RR: 18 (01-25-21 @ 07:11) (18 - 18)  SpO2: 98% (01-25-21 @ 07:11) (98% - 100%)  General: AAOx0; NAD; lethargic  Head: AT/NC  ENT: Dry mucous membranes No Injury  Neck: Non-tender; No JVD  CVS: RRR, S1&S2, No murmur, b/L LE Edema  Respiratory: Decreased bibasilar breath sounds; Normal Respiratory Effort  Abdomen/GI: Soft, non-tender, non-distended, normal bowel sounds  : No bladder distention, Simons in place with dark concentrated urine  Extremites: No cyanosis, No clubbing, LE Edema  Neuro: AAOx1, Limited exam as patient not cooperative.   Psych: Unable to evaluate  Skin: Clean, Dry and Intact      01-25    139  |  112<H>  |  107<H>  ----------------------------<  88  4.1   |  18<L>  |  2.71<H>    Ca    10.0      25 Jan 2021 07:25    TPro  5.3<L>  /  Alb  x   /  TBili  x   /  DBili  x   /  AST  x   /  ALT  x   /  AlkPhos  x   01-24    COVID-19 PCR: NotDetec (20 Jan 2021 22:07)  COVID-19 PCR: NotDetec (19 Jan 2021 15:21)    CAPILLARY BLOOD GLUCOSE                                12.5   16.24 )-----------( 251      ( 23 Jan 2021 10:33 )             39.1     01-24    143  |  115<H>  |  113<H>  ----------------------------<  93  4.4   |  19<L>  |  2.89<H>    Ca    10.4<H>      24 Jan 2021 10:22  Phos  3.7     01-23  Mg     2.4     01-23    TPro  5.3<L>  /  Alb  x   /  TBili  x   /  DBili  x   /  AST  x   /  ALT  x   /  AlkPhos  x   01-24    COVID-19 PCR: NotDetec (20 Jan 2021 22:07)  COVID-19 PCR: NotDetec (19 Jan 2021 15:21)    CAPILLARY BLOOD GLUCOSE                                12.5   16.24 )-----------( 251      ( 23 Jan 2021 10:33 )             39.1     01-23    146<H>  |  120<H>  |  120<H>  ----------------------------<  92  4.6   |  18<L>  |  3.21<H>    Ca    10.5<H>      23 Jan 2021 10:33  Phos  3.7     01-23  Mg     2.4     01-23    TPro  6.3  /  Alb  1.4<L>  /  TBili  1.0  /  DBili  x   /  AST  22  /  ALT  25  /  AlkPhos  182<H>  01-23    COVID-19 PCR: NotDetec (20 Jan 2021 22:07)  COVID-19 PCR: NotDetec (19 Jan 2021 15:21)    CAPILLARY BLOOD GLUCOSE                                13.6   19.05 )-----------( 275      ( 22 Jan 2021 10:08 )             43.1     01-22    152<H>  |  127<H>  |  139<H>  ----------------------------<  110<H>  5.1   |  17<L>  |  3.56<H>    Ca    11.8<H>      22 Jan 2021 10:08  Phos  4.2     01-22  Mg     2.8     01-22    TPro  6.7  /  Alb  1.6<L>  /  TBili  1.2  /  DBili  x   /  AST  29  /  ALT  27  /  AlkPhos  189<H>  01-22    COVID-19 PCR: NotDetec (20 Jan 2021 22:07)  COVID-19 PCR: NotDetec (19 Jan 2021 15:21)    CAPILLARY BLOOD GLUCOSE                                  13.0   21.26 )-----------( 289      ( 21 Jan 2021 08:48 )             40.8     01-21    156<H>  |  128<H>  |  151<H>  ----------------------------<  121<H>  4.7   |  18<L>  |  4.07<H>    Ca    10.8<H>      21 Jan 2021 08:48  Phos  4.9     01-21  Mg     2.9     01-21    TPro  6.2  /  Alb  1.4<L>  /  TBili  1.3<H>  /  DBili  x   /  AST  30  /  ALT  29  /  AlkPhos  205<H>  01-21    COVID-19 PCR: NotDetec (20 Jan 2021 22:07)  COVID-19 PCR: NotDetec (19 Jan 2021 15:21)    CAPILLARY BLOOD GLUCOSE          Culture - Blood (collected 19 Jan 2021 15:33)  Source: .Blood None  Preliminary Report (20 Jan 2021 20:01):    No growth to date.    Culture - Blood (collected 19 Jan 2021 15:33)  Source: .Blood None  Preliminary Report (20 Jan 2021 20:01):    No growth to date.    Culture - Urine (collected 19 Jan 2021 15:21)  Source: .Urine None  Final Report (20 Jan 2021 14:39):    No growth                            13.8   23.11 )-----------( 292      ( 20 Jan 2021 13:06 )             43.2     01-20    150<H>  |  123<H>  |  148<H>  ----------------------------<  76  4.8   |  19<L>  |  4.16<H>    Ca    11.1<H>      20 Jan 2021 13:06  Phos  5.5     01-20  Mg     2.8     01-20    TPro  6.9  /  Alb  1.8<L>  /  TBili  2.1<H>  /  DBili  x   /  AST  63<H>  /  ALT  45  /  AlkPhos  266<H>  01-19    COVID-19 PCR: NotDetec (19 Jan 2021 15:21)    CAPILLARY BLOOD GLUCOSE          Culture - Urine (collected 19 Jan 2021 15:21)  Source: .Urine None  Final Report (20 Jan 2021 14:39):    No growth        < from: CT Head No Cont (01.19.21 @ 14:38) >  IMPRESSION:  Limited exam due to motion    No acute intracranial hemorrhage or acute territorial infarct.  If symptoms persist, follow-up MRI exam recommended.    < end of copied text >  < from: Xray Chest 1 View- PORTABLE-Urgent (Xray Chest 1 View- PORTABLE-Urgent .) (01.19.21 @ 14:18) >    IMPRESSION: Vague right perihilar infiltrate.    < end of copied text >    < from: CT Head No Cont (01.21.21 @ 09:55) >  IMPRESSION:    -No acute intracranial findings.    < end of copied text >  < from: US Kidney and Bladder (01.20.21 @ 19:10) >  IMPRESSION:    Overdistended, 1300 cc urinary bladder volume causing mild bilateral hydronephrosis.    < end of copied text >    I personally reviewed vitals, labs, orders and imaging

## 2021-01-26 LAB
% ALBUMIN: 37.4 % — SIGNIFICANT CHANGE UP
% ALPHA 1: 10.1 % — SIGNIFICANT CHANGE UP
% ALPHA 2: 15.9 % — SIGNIFICANT CHANGE UP
% BETA: 14.4 % — SIGNIFICANT CHANGE UP
% GAMMA: 22.2 % — SIGNIFICANT CHANGE UP
ALBUMIN SERPL ELPH-MCNC: 1.5 G/DL — LOW (ref 3.3–5)
ALBUMIN SERPL ELPH-MCNC: 2 G/DL — LOW (ref 3.6–5.5)
ALBUMIN/GLOB SERPL ELPH: 0.6 RATIO — SIGNIFICANT CHANGE UP
ALPHA1 GLOB SERPL ELPH-MCNC: 0.5 G/DL — HIGH (ref 0.1–0.4)
ALPHA2 GLOB SERPL ELPH-MCNC: 0.8 G/DL — SIGNIFICANT CHANGE UP (ref 0.5–1)
ANION GAP SERPL CALC-SCNC: 10 MMOL/L — SIGNIFICANT CHANGE UP (ref 5–17)
B-GLOBULIN SERPL ELPH-MCNC: 0.8 G/DL — SIGNIFICANT CHANGE UP (ref 0.5–1)
BUN SERPL-MCNC: 96 MG/DL — HIGH (ref 7–23)
CALCIUM SERPL-MCNC: 10.2 MG/DL — HIGH (ref 8.5–10.1)
CHLORIDE SERPL-SCNC: 114 MMOL/L — HIGH (ref 96–108)
CO2 SERPL-SCNC: 16 MMOL/L — LOW (ref 22–31)
CREAT SERPL-MCNC: 2.63 MG/DL — HIGH (ref 0.5–1.3)
GAMMA GLOBULIN: 1.2 G/DL — SIGNIFICANT CHANGE UP (ref 0.6–1.6)
GLUCOSE SERPL-MCNC: 63 MG/DL — LOW (ref 70–99)
HCT VFR BLD CALC: 39.3 % — SIGNIFICANT CHANGE UP (ref 39–50)
HGB BLD-MCNC: 12.7 G/DL — LOW (ref 13–17)
INTERPRETATION SERPL IFE-IMP: SIGNIFICANT CHANGE UP
MCHC RBC-ENTMCNC: 27.5 PG — SIGNIFICANT CHANGE UP (ref 27–34)
MCHC RBC-ENTMCNC: 32.3 GM/DL — SIGNIFICANT CHANGE UP (ref 32–36)
MCV RBC AUTO: 85.1 FL — SIGNIFICANT CHANGE UP (ref 80–100)
PHOSPHATE SERPL-MCNC: 4.2 MG/DL — SIGNIFICANT CHANGE UP (ref 2.5–4.5)
PLATELET # BLD AUTO: 243 K/UL — SIGNIFICANT CHANGE UP (ref 150–400)
POTASSIUM SERPL-MCNC: 4.8 MMOL/L — SIGNIFICANT CHANGE UP (ref 3.5–5.3)
POTASSIUM SERPL-SCNC: 4.8 MMOL/L — SIGNIFICANT CHANGE UP (ref 3.5–5.3)
PROT PATTERN SERPL ELPH-IMP: SIGNIFICANT CHANGE UP
RBC # BLD: 4.62 M/UL — SIGNIFICANT CHANGE UP (ref 4.2–5.8)
RBC # FLD: 17.8 % — HIGH (ref 10.3–14.5)
SODIUM SERPL-SCNC: 140 MMOL/L — SIGNIFICANT CHANGE UP (ref 135–145)
WBC # BLD: 16.84 K/UL — HIGH (ref 3.8–10.5)
WBC # FLD AUTO: 16.84 K/UL — HIGH (ref 3.8–10.5)

## 2021-01-26 PROCEDURE — 99233 SBSQ HOSP IP/OBS HIGH 50: CPT

## 2021-01-26 PROCEDURE — 99232 SBSQ HOSP IP/OBS MODERATE 35: CPT

## 2021-01-26 PROCEDURE — 70551 MRI BRAIN STEM W/O DYE: CPT | Mod: 26

## 2021-01-26 RX ADMIN — LATANOPROST 1 DROP(S): 0.05 SOLUTION/ DROPS OPHTHALMIC; TOPICAL at 20:48

## 2021-01-26 RX ADMIN — Medication 81 MILLIGRAM(S): at 10:36

## 2021-01-26 RX ADMIN — CEFEPIME 1000 MILLIGRAM(S): 1 INJECTION, POWDER, FOR SOLUTION INTRAMUSCULAR; INTRAVENOUS at 20:48

## 2021-01-26 RX ADMIN — TAMSULOSIN HYDROCHLORIDE 0.4 MILLIGRAM(S): 0.4 CAPSULE ORAL at 20:48

## 2021-01-26 RX ADMIN — SODIUM CHLORIDE 100 MILLILITER(S): 9 INJECTION, SOLUTION INTRAVENOUS at 10:46

## 2021-01-26 RX ADMIN — CEFEPIME 1000 MILLIGRAM(S): 1 INJECTION, POWDER, FOR SOLUTION INTRAMUSCULAR; INTRAVENOUS at 10:36

## 2021-01-26 RX ADMIN — HEPARIN SODIUM 5000 UNIT(S): 5000 INJECTION INTRAVENOUS; SUBCUTANEOUS at 20:48

## 2021-01-26 RX ADMIN — DORZOLAMIDE HYDROCHLORIDE TIMOLOL MALEATE 1 DROP(S): 20; 5 SOLUTION/ DROPS OPHTHALMIC at 10:36

## 2021-01-26 RX ADMIN — DORZOLAMIDE HYDROCHLORIDE TIMOLOL MALEATE 1 DROP(S): 20; 5 SOLUTION/ DROPS OPHTHALMIC at 20:47

## 2021-01-26 RX ADMIN — Medication 0.25 MILLIGRAM(S): at 08:41

## 2021-01-26 RX ADMIN — HEPARIN SODIUM 5000 UNIT(S): 5000 INJECTION INTRAVENOUS; SUBCUTANEOUS at 10:36

## 2021-01-26 NOTE — PROGRESS NOTE ADULT - SUBJECTIVE AND OBJECTIVE BOX
NEPHROLOGY INTERVAL HPI/OVERNIGHT EVENTS:    Date of Service: 01-26-21 @ 15:14    1/26--wakes up easily when called.  Unable to answer any questions.  No apparent distress.  1/25--No acute events overnight.  CRISTELA slowly improving.  No hx of Dementia per pt's daughter.  Pt awake but unable to answer any questions with garbled speech.    HPI:  89 M with PMH of Afib, not currently on AC's, BPH,  post hospitalization s/p NSTEMI d/c to subacute rehab presenting to the ED from rehab with AMS, SOB. Per rehab, patient was found today in bed mumbling, not making sense with associated SOB. Sent patient to the ED for further evaluation. Pt is on baby ASA. Unable to obtain full HPI secondary to patients AMS. Admit for HCAP    hx fromchart  pt admitted for cristela with hypernatremia   hx of ? ckd with recent scr of 1.5    PAST MEDICAL & SURGICAL HISTORY:  Atrial fibrillation  Macular degeneration  History of BPH  Glaucoma  Inguinal hernia  left  History of prostate surgery  2019  S/P ORIF (open reduction internal fixation) fracture  right leg 1988    MEDICATIONS  (STANDING):  aspirin enteric coated 81 milliGRAM(s) Oral daily  cefepime  Injectable. 1000 milliGRAM(s) IV Push every 12 hours  dorzolamide 2%/timolol 0.5% Ophthalmic Solution 1 Drop(s) Both EYES two times a day  heparin   Injectable 5000 Unit(s) SubCutaneous every 12 hours  latanoprost 0.005% Ophthalmic Solution 1 Drop(s) Both EYES at bedtime  sodium chloride 0.45%. 1000 milliLiter(s) (100 mL/Hr) IV Continuous <Continuous>  tamsulosin 0.4 milliGRAM(s) Oral at bedtime    MEDICATIONS  (PRN):  acetaminophen   Tablet .. 650 milliGRAM(s) Oral every 6 hours PRN Temp greater or equal to 38.5C (101.3F), Mild Pain (1 - 3)  acetaminophen  Suppository .. 650 milliGRAM(s) Rectal every 6 hours PRN Temp greater or equal to 38.5C (101.3F)  aluminum hydroxide/magnesium hydroxide/simethicone Suspension 30 milliLiter(s) Oral every 4 hours PRN Dyspepsia  ondansetron Injectable 4 milliGRAM(s) IV Push every 6 hours PRN Nausea    Vital Signs Last 24 Hrs  T(C): 35.9 (26 Jan 2021 08:13), Max: 36.1 (25 Jan 2021 16:12)  T(F): 96.6 (26 Jan 2021 08:13), Max: 96.9 (25 Jan 2021 16:12)  HR: 59 (26 Jan 2021 08:13) (58 - 65)  BP: 177/86 (26 Jan 2021 08:13) (165/78 - 177/86)  BP(mean): --  RR: 18 (26 Jan 2021 08:13) (18 - 19)  SpO2: 98% (26 Jan 2021 08:13) (98% - 100%)  Daily     Daily     01-25 @ 07:01 - 01-26 @ 07:00  --------------------------------------------------------  IN: 981 mL / OUT: 1425 mL / NET: -444 mL    01-26 @ 07:01 - 01-26 @ 15:14  --------------------------------------------------------  IN: 950 mL / OUT: 0 mL / NET: 950 mL    PHYSICAL EXAM:  GENERAL: No acute distress  CHEST/LUNG: Fair air entry  HEART: S1S2 RRR  ABDOMEN: soft  EXTREMITIES: no edema  SKIN:     LABS:                        12.7   16.84 )-----------( 243      ( 26 Jan 2021 07:52 )             39.3     01-26    140  |  114<H>  |  96<H>  ----------------------------<  63<L>  4.8   |  16<L>  |  2.63<H>    Ca    10.2<H>      26 Jan 2021 07:52  Phos  4.2     01-26    TPro  x   /  Alb  1.5<L>  /  TBili  x   /  DBili  x   /  AST  x   /  ALT  x   /  AlkPhos  x   01-26        Phosphorus Level, Serum: 4.2 mg/dL (01-26 @ 07:52)          RADIOLOGY & ADDITIONAL TESTS:

## 2021-01-26 NOTE — PROGRESS NOTE ADULT - ASSESSMENT
89 you with recent NSTEMI transferred from SNF with lethargy.  Found to be in CRISTELA with hypernatremia due to dehydration in presence of ace-I  Noted with hypercalcemia   Pna with leukocytosis     PLAN   - change ivf to d5w and inc rate    fu uop currently incontinent    prn bladder scan   - pna abx   - fu trend of calcium level.  if no improvement with current fluid composition, may need to change fluid composition  - palliative discussion noted, to revisit on friday 1/21  Na elevated to 156, On D5W  CRISTELA improving  PNA on abx   hypercalcemia, cont IVF  check PTH r/o primary hyperparathyroidism    1/22  doubt hypercalcemia due to dehydration solely, since calcium up while Na and Creat improving  PTH pending.  If elevated, and primary hyper para suspected, may get cinecalcet 30 mg BID  If PTH suppressed may be due to malignancy, sarcoid etc   -will need other means to treat the underlying dz , calcitonin etc  Low K diet, K up to 5.1 on hydration  Simons draining good UO  Dr Simmons covering this weekend    1/25 SY  --CRISTELA /CKD :? baseline creat of 1.5. : renal function slowly improving.    --Hypernatremia : improved : change IVF to 1/2 NS to maintain current serum sodium level and for Calcium excretion:   --Hypercalcemia of unclear etiology : Free light chain Kappa/Lambda ratio wnl., follow up serum MANOLO.  --AMS : unclear if all due to metabolic encephalopathy : continue to monitor.    1/26 SY  --CRISTELA/ with unclear CKD : Renal function continues to slowly improve.  --Hypernatremia :  improved and stable.  --Hypercalcemia : Ig G Kappa band positive though unclear if significant with normal Kappa to Lambda ratio.  Will need Heme evaluation.  --AMS : follow up MRI of head.  --Monitor metabolic acidosis.

## 2021-01-26 NOTE — PROGRESS NOTE ADULT - ASSESSMENT
89 year old man admitted with Severe Sepsis secondary to RLL pneumonia HCAP vs Aspiration Pneumonia, CRISTELA due to Sepsis + Obstructive Uropathy, hypernatremia and hypercalcemia likely related to dehydration.     Neurology consulted for altered mental status.  The patient does seem to have an element of aphasia, but it is difficult to discern how much is secondary to encephalopathy. No other focal findings are present.  Creatinine trending down, but no significant improvement in mental status.  EEG suggestive of encephalopathy.  Would attempt MRI brain to r/o acute structural pathology such as infarct.  Consider changing antibiotics if possible as cefepime may contribute to altered mental status, particularly in the setting of renal failure.    will f/u as needed

## 2021-01-26 NOTE — PROGRESS NOTE ADULT - ASSESSMENT
89y old Male with hx of Afib (not on AC), BPH, w/ recent NSTEMI at [facility] (found after pt admitted there for a fall) and was dc'd to [facility] on 1/13. Now admitted at  on 1/19 for AMS and SOB. Found to have CXR showing R perihilar infiltrate, COVID-19 negative, CTH negative for acute pathology, leukocytosis (wbc>30) and CRISTELA with Cr>4. Palliative Care consulted to assist with establishing GOC.     1) AMS  - daughter clarified for primary team on admission that pt does not have dementia  - likely encephalopathy/hypoactive delirium from underlying infection and metabolic abnormalities  - CTH negative--> repeat also negative  - however still with garbled speech which may be due to kidney dysfunction. ?MRI if does not improve with improving renal function  - neurology now also consulted   - eeg done with generalized background slowing c/w encephalopathy and no evidence of seizure  - pt went for MRI today, awaiting read and neuro impression  - fall and aspiration precautions    2) HCAP  - CXR showing infiltrate on R  - IV abx  - supplemental O2 as needed  - can also consider addition of mucinex for audible secretions  - COVID negative x2  - WBC improved    3) CRISTELA  - unclear, possibly do to poor po intake  - renal US showed retention of >1L--> reilly in place now draining well   - nephrology consult appreciated, fluids adjusted, monitoring  - Cr elevated>4, but slowly improving. Likely to continue improving with reilly placed  - avoid nephrotoxic agents as able  - also would consider that pt just had NSTEMI    4) Recent NSTEMI  - recently treated for NSTEMI at outside hospital  - ? echo  - ? cardio to follow up and ensure this is not contributing?    5) Debility  - PPS<40%  - speech and swallow eval appreciated, awaiting impression  - suggest nutrition eval given evidence of malnutrition on exam   - as per care coordination notes pt fully independent and living alone, with visits from daughter who helps with errands and appointments, pt does not drive  - PT consulted as pt is coming from rehab-->max assist, not following commands, MIKE recommended    6) Prognosis  - guarded  - pt with advanced age, significant comorbidities, including recent hospitalization for NSTEMI, now with evidence of multiorgan compromise, recent fall, PPS<40% at this time, albumin 1.8 with evidence of malnutrition on exam; pt at increased risk for complications    7) GOC/Advanced Directives  - pt does not currently have capacity for decision making, but according to EMR, pt was was cognitively intact though very [place] prior  - surrogate decision maker is daughter Ayesha Le [phone]  - MOLST on chart 1/19: DNR and DNI  - GOC meeting initiated 1/20- plan to give pt more time to see if he can improve, as desired plan is for MIKE and home. Follow up meeting held 1/22 to touch base/update leading to same goals. See GOC note that will follow. Will call again tomorrow once MRI resulted to discuss goals again since pt's mentation has not seemed to have improved (though still uremic).     Thank you for including us in Mr. Long's care. Will continue to follow with you.    Ridge Oakley MD  Palliative Care Attending

## 2021-01-26 NOTE — PROGRESS NOTE ADULT - SUBJECTIVE AND OBJECTIVE BOX
Interval History:  1/26/21: No new events. Patient remains somnolent and confused    MEDICATIONS  (STANDING):  aspirin enteric coated 81 milliGRAM(s) Oral daily  cefepime  Injectable. 1000 milliGRAM(s) IV Push every 12 hours  dorzolamide 2%/timolol 0.5% Ophthalmic Solution 1 Drop(s) Both EYES two times a day  heparin   Injectable 5000 Unit(s) SubCutaneous every 12 hours  latanoprost 0.005% Ophthalmic Solution 1 Drop(s) Both EYES at bedtime  sodium chloride 0.45%. 1000 milliLiter(s) (100 mL/Hr) IV Continuous <Continuous>  tamsulosin 0.4 milliGRAM(s) Oral at bedtime    MEDICATIONS  (PRN):  acetaminophen   Tablet .. 650 milliGRAM(s) Oral every 6 hours PRN Temp greater or equal to 38.5C (101.3F), Mild Pain (1 - 3)  acetaminophen  Suppository .. 650 milliGRAM(s) Rectal every 6 hours PRN Temp greater or equal to 38.5C (101.3F)  aluminum hydroxide/magnesium hydroxide/simethicone Suspension 30 milliLiter(s) Oral every 4 hours PRN Dyspepsia  LORazepam     Tablet 0.25 milliGRAM(s) Oral once PRN MRI premed  ondansetron Injectable 4 milliGRAM(s) IV Push every 6 hours PRN Nausea      Allergies    No Known Allergies    Intolerances        PHYSICAL EXAM:  Vital Signs Last 24 Hrs  T(F): 96.5 (01-26-21 @ 05:21)  HR: 62 (01-26-21 @ 05:21)  BP: 170/80 (01-26-21 @ 05:21)  RR: 19 (01-26-21 @ 05:21)    GENERAL: NAD, well-groomed, well-developed  HEAD:  Atraumatic, Normocephalic  Neuro:  Somnolent but easily arousable  Paucity of speech. When asked his name he does not answer but when asked if he is Phu he says yes.  CN: PERRL, EOMI, no nystagmus, no facial weakness, tongue protrudes in the midline  Motor: not cooperative with confrontation testing, but moves all extremities  Sens: withdraws to noxious stimuli in all four extremiteis  Reflexes: Symmetric and normal . BJ 1, BR 1, KJ 1, plantars flexor b/l  Coord:  unable to cooperate. No involuntary movements noted.  LABS:    01-25    139  |  112<H>  |  107<H>  ----------------------------<  88  4.1   |  18<L>  |  2.71<H>    Ca    10.0      25 Jan 2021 07:25    TPro  5.3<L>  /  Alb  x   /  TBili  x   /  DBili  x   /  AST  x   /  ALT  x   /  AlkPhos  x   01-24          RADIOLOGY & ADDITIONAL STUDIES:  CT head 1/19/21:  Limited exam due to motion  No acute intracranial hemorrhage or acute territorial infarct.  If symptoms persist, follow-up MRI exam recommended.    CT head 1/21/21: no acute intracranial findings    EEG 1/24/21: Moderate to marked generalized slowing

## 2021-01-26 NOTE — PROGRESS NOTE ADULT - ASSESSMENT
ASSESSMENT:    Severe Sepsis secondary to RLL pneumonia HCAP vs Aspiration Pneumonia  CRISTELA due to Sepsis + Obstructive Uropathy  Hypernatremia due to Dehydration. Resolved  Hypercalcemia likely due to dehdyration  Afib (not on AC; deferred by patient and family in outpatient setting)  Dementia  CAD  BPH      PLAN    Continue to monitor Electrolytes and Cr. Correct electrolytes accordinlgy.   Strict I/Os s/p Simons insertion for urinary retention  Nephrology follow up appreciated  IVF as needed for fluid balance  Avoid nephrotoxic agents  Empiric ABX. Cultures NGTD.   SLP evaluation->Diet adjusted accordingly  Palliative care follow up appreciated  MRI brain: no acute CVA  Neurology consult apprecaited    DVT Prophylaxis: Heparin subq      Prognosis- poor

## 2021-01-26 NOTE — PROGRESS NOTE ADULT - SUBJECTIVE AND OBJECTIVE BOX
HPI: Pt seen and examined this am in follow up for sx. Pt lethargic after MRI, got small dose of ativan prior to this. Though not very different from how he has been, slurring words and unable to give HPI. Appears comfortable. No other issues as per RN.     PAIN: no nonverbal signs of pain    DYSPNEA: no nonverbal signs of dyspnea      ROS:  Unable to gather 2/2 to ams    PHYSICAL EXAM:    Vital Signs Last 24 Hrs  T(C): 35.9 (26 Jan 2021 08:13), Max: 36.1 (25 Jan 2021 16:12)  T(F): 96.6 (26 Jan 2021 08:13), Max: 96.9 (25 Jan 2021 16:12)  HR: 59 (26 Jan 2021 08:13) (58 - 65)  BP: 177/86 (26 Jan 2021 08:13) (165/78 - 177/86)  BP(mean): --  RR: 18 (26 Jan 2021 08:13) (18 - 19)  SpO2: 98% (26 Jan 2021 08:13) (98% - 100%)  Daily     Daily     PPSV2: 30  %    General: Elderly male lying in bed, ill-appearing, awake and trying to respond but speech garbled, not yet following commands  Mental Status: answers to name, unable to discern further  HEENT: dmm, + temporal wasting  Lungs: dec at bases bl  Cardiac: + s1 s2 rrr  GI: soft nt nd +bs  : incontinent  MSK/skin: moves all extremities spontaneously, some edema in feet, some muscle and fat wasting noted in extremities  Neuro: limited by mentation, responds to touch, garbled speech    LABS:                        12.7   16.84 )-----------( 243      ( 26 Jan 2021 07:52 )             39.3     01-26    140  |  114<H>  |  96<H>  ----------------------------<  63<L>  4.8   |  16<L>  |  2.63<H>    Ca    10.2<H>      26 Jan 2021 07:52  Phos  4.2     01-26    TPro  x   /  Alb  1.5<L>  /  TBili  x   /  DBili  x   /  AST  x   /  ALT  x   /  AlkPhos  x   01-26      Albumin: Albumin, Serum: 1.5 g/dL (01-26 @ 07:52)      Allergies    No Known Allergies    Intolerances      MEDICATIONS  (STANDING):  aspirin enteric coated 81 milliGRAM(s) Oral daily  cefepime  Injectable. 1000 milliGRAM(s) IV Push every 12 hours  dorzolamide 2%/timolol 0.5% Ophthalmic Solution 1 Drop(s) Both EYES two times a day  heparin   Injectable 5000 Unit(s) SubCutaneous every 12 hours  latanoprost 0.005% Ophthalmic Solution 1 Drop(s) Both EYES at bedtime  sodium chloride 0.45%. 1000 milliLiter(s) (100 mL/Hr) IV Continuous <Continuous>  tamsulosin 0.4 milliGRAM(s) Oral at bedtime    MEDICATIONS  (PRN):  acetaminophen   Tablet .. 650 milliGRAM(s) Oral every 6 hours PRN Temp greater or equal to 38.5C (101.3F), Mild Pain (1 - 3)  acetaminophen  Suppository .. 650 milliGRAM(s) Rectal every 6 hours PRN Temp greater or equal to 38.5C (101.3F)  aluminum hydroxide/magnesium hydroxide/simethicone Suspension 30 milliLiter(s) Oral every 4 hours PRN Dyspepsia  ondansetron Injectable 4 milliGRAM(s) IV Push every 6 hours PRN Nausea

## 2021-01-26 NOTE — PROGRESS NOTE ADULT - SUBJECTIVE AND OBJECTIVE BOX
SUBJECTIVE: Limited ROS/CC as patient is AMS.     1/20-1/21:Overnight Bladder US ~1600cc in bladder -> Simons insertion with 1400cc drained.   1/22: More energy; Still confused and speech is still off. Unsure whether expressive aphasia is related to neurologic event (patient has risks including Afib not on AC) vs Metabolic. Limited ROS/CC  1:23: more lethargic today. Producing less words. Urine output adequate. Cr and Na improving.   1/24: some improvement; speech not coherent; no apparent new focal deficit; Patient resting comfortabley  1/25: No significant change; speech not coherent; no apparent new focal deficit; Patient resting comfortabley; follow minimal commands  01/26/21: Patient seen and examined after coming back from MRI, sleepy, received IV ativan prior to MRI.     FROM H&P:    "89 M with PMH of Afib, not currently on AC's, BPH,  post hospitalization s/p NSTEMI d/c to subacute rehab presenting to the ED from rehab with AMS, SOB. Per rehab, patient was found today in bed mumbling, not making sense with associated SOB. Sent patient to the ED for further evaluation. Pt is on baby ASA. Unable to obtain full HPI secondary to patients AMS. Admit for HCAP."        PHYSICAL EXAM:    Vital Signs Last 24 Hrs  T(C): 35.9 (26 Jan 2021 08:13), Max: 36.1 (25 Jan 2021 16:12)  T(F): 96.6 (26 Jan 2021 08:13), Max: 96.9 (25 Jan 2021 16:12)  HR: 59 (26 Jan 2021 08:13) (58 - 65)  BP: 177/86 (26 Jan 2021 08:13) (165/78 - 177/86)  BP(mean): --  RR: 18 (26 Jan 2021 08:13) (18 - 19)  SpO2: 98% (26 Jan 2021 08:13) (98% - 100%)      General: AAOx0; NAD; lethargic  Head: AT/NC  ENT: Dry mucous membranes No Injury  Neck: Non-tender; No JVD  CVS: RRR, S1&S2, No murmur, b/L LE Edema  Respiratory: Decreased bibasilar breath sounds; Normal Respiratory Effort  Abdomen/GI: Soft, non-tender, non-distended, normal bowel sounds  : No bladder distention, Simons in place with dark concentrated urine  Extremites: No cyanosis, No clubbing, LE Edema  Neuro: AAOx1, Limited exam as patient not cooperative.   Psych: Unable to evaluate  Skin: Clean, Dry and Intact                              12.7   16.84 )-----------( 243      ( 26 Jan 2021 07:52 )             39.3     26 Jan 2021 07:52    140    |  114    |  96     ----------------------------<  63     4.8     |  16     |  2.63     Ca    10.2       26 Jan 2021 07:52  Phos  4.2       26 Jan 2021 07:52    TPro  x      /  Alb  1.5    /  TBili  x      /  DBili  x      /  AST  x      /  ALT  x      /  AlkPhos  x      26 Jan 2021 07:52    LIVER FUNCTIONS - ( 26 Jan 2021 07:52 )  Alb: 1.5 g/dL / Pro: x     / ALK PHOS: x     / ALT: x     / AST: x     / GGT: x             MEDICATIONS  (STANDING):  aspirin enteric coated 81 milliGRAM(s) Oral daily  cefepime  Injectable. 1000 milliGRAM(s) IV Push every 12 hours  dorzolamide 2%/timolol 0.5% Ophthalmic Solution 1 Drop(s) Both EYES two times a day  heparin   Injectable 5000 Unit(s) SubCutaneous every 12 hours  latanoprost 0.005% Ophthalmic Solution 1 Drop(s) Both EYES at bedtime  sodium chloride 0.45%. 1000 milliLiter(s) (100 mL/Hr) IV Continuous <Continuous>  tamsulosin 0.4 milliGRAM(s) Oral at bedtime    MEDICATIONS  (PRN):  acetaminophen   Tablet .. 650 milliGRAM(s) Oral every 6 hours PRN Temp greater or equal to 38.5C (101.3F), Mild Pain (1 - 3)  acetaminophen  Suppository .. 650 milliGRAM(s) Rectal every 6 hours PRN Temp greater or equal to 38.5C (101.3F)  aluminum hydroxide/magnesium hydroxide/simethicone Suspension 30 milliLiter(s) Oral every 4 hours PRN Dyspepsia  ondansetron Injectable 4 milliGRAM(s) IV Push every 6 hours PRN Nausea

## 2021-01-26 NOTE — PROVIDER CONTACT NOTE (OTHER) - SITUATION
Tele MD service spoke with Roger to request MD routine consult.
Spoke to [place].  is aware of consult.
Informed Yvonne at md office of consult

## 2021-01-27 DIAGNOSIS — J18.9 PNEUMONIA, UNSPECIFIED ORGANISM: ICD-10-CM

## 2021-01-27 DIAGNOSIS — N17.9 ACUTE KIDNEY FAILURE, UNSPECIFIED: ICD-10-CM

## 2021-01-27 DIAGNOSIS — D89.2 HYPERGAMMAGLOBULINEMIA, UNSPECIFIED: ICD-10-CM

## 2021-01-27 LAB
ALBUMIN SERPL ELPH-MCNC: 1.4 G/DL — LOW (ref 3.3–5)
ANION GAP SERPL CALC-SCNC: 13 MMOL/L — SIGNIFICANT CHANGE UP (ref 5–17)
BUN SERPL-MCNC: 92 MG/DL — HIGH (ref 7–23)
CALCIUM SERPL-MCNC: 10.9 MG/DL — HIGH (ref 8.5–10.1)
CHLORIDE SERPL-SCNC: 115 MMOL/L — HIGH (ref 96–108)
CO2 SERPL-SCNC: 13 MMOL/L — LOW (ref 22–31)
CREAT SERPL-MCNC: 2.46 MG/DL — HIGH (ref 0.5–1.3)
GLUCOSE SERPL-MCNC: 62 MG/DL — LOW (ref 70–99)
HCT VFR BLD CALC: 39.2 % — SIGNIFICANT CHANGE UP (ref 39–50)
HGB BLD-MCNC: 11.9 G/DL — LOW (ref 13–17)
MCHC RBC-ENTMCNC: 27.1 PG — SIGNIFICANT CHANGE UP (ref 27–34)
MCHC RBC-ENTMCNC: 30.4 GM/DL — LOW (ref 32–36)
MCV RBC AUTO: 89.3 FL — SIGNIFICANT CHANGE UP (ref 80–100)
PHOSPHATE SERPL-MCNC: 5.3 MG/DL — HIGH (ref 2.5–4.5)
PLATELET # BLD AUTO: 229 K/UL — SIGNIFICANT CHANGE UP (ref 150–400)
POTASSIUM SERPL-MCNC: 4.9 MMOL/L — SIGNIFICANT CHANGE UP (ref 3.5–5.3)
POTASSIUM SERPL-SCNC: 4.9 MMOL/L — SIGNIFICANT CHANGE UP (ref 3.5–5.3)
RBC # BLD: 4.39 M/UL — SIGNIFICANT CHANGE UP (ref 4.2–5.8)
RBC # FLD: 18.4 % — HIGH (ref 10.3–14.5)
SODIUM SERPL-SCNC: 141 MMOL/L — SIGNIFICANT CHANGE UP (ref 135–145)
WBC # BLD: 19.76 K/UL — HIGH (ref 3.8–10.5)
WBC # FLD AUTO: 19.76 K/UL — HIGH (ref 3.8–10.5)

## 2021-01-27 PROCEDURE — 99232 SBSQ HOSP IP/OBS MODERATE 35: CPT

## 2021-01-27 PROCEDURE — 99233 SBSQ HOSP IP/OBS HIGH 50: CPT

## 2021-01-27 RX ORDER — SODIUM BICARBONATE 1 MEQ/ML
650 SYRINGE (ML) INTRAVENOUS
Refills: 0 | Status: DISCONTINUED | OUTPATIENT
Start: 2021-01-27 | End: 2021-01-29

## 2021-01-27 RX ORDER — AMLODIPINE BESYLATE 2.5 MG/1
2.5 TABLET ORAL DAILY
Refills: 0 | Status: DISCONTINUED | OUTPATIENT
Start: 2021-01-27 | End: 2021-01-29

## 2021-01-27 RX ADMIN — CEFEPIME 1000 MILLIGRAM(S): 1 INJECTION, POWDER, FOR SOLUTION INTRAMUSCULAR; INTRAVENOUS at 20:36

## 2021-01-27 RX ADMIN — DORZOLAMIDE HYDROCHLORIDE TIMOLOL MALEATE 1 DROP(S): 20; 5 SOLUTION/ DROPS OPHTHALMIC at 20:36

## 2021-01-27 RX ADMIN — Medication 650 MILLIGRAM(S): at 20:36

## 2021-01-27 RX ADMIN — DORZOLAMIDE HYDROCHLORIDE TIMOLOL MALEATE 1 DROP(S): 20; 5 SOLUTION/ DROPS OPHTHALMIC at 08:57

## 2021-01-27 RX ADMIN — SODIUM CHLORIDE 100 MILLILITER(S): 9 INJECTION, SOLUTION INTRAVENOUS at 00:03

## 2021-01-27 RX ADMIN — TAMSULOSIN HYDROCHLORIDE 0.4 MILLIGRAM(S): 0.4 CAPSULE ORAL at 20:36

## 2021-01-27 RX ADMIN — HEPARIN SODIUM 5000 UNIT(S): 5000 INJECTION INTRAVENOUS; SUBCUTANEOUS at 20:36

## 2021-01-27 RX ADMIN — LATANOPROST 1 DROP(S): 0.05 SOLUTION/ DROPS OPHTHALMIC; TOPICAL at 20:35

## 2021-01-27 RX ADMIN — Medication 81 MILLIGRAM(S): at 08:57

## 2021-01-27 RX ADMIN — CEFEPIME 1000 MILLIGRAM(S): 1 INJECTION, POWDER, FOR SOLUTION INTRAMUSCULAR; INTRAVENOUS at 08:57

## 2021-01-27 RX ADMIN — HEPARIN SODIUM 5000 UNIT(S): 5000 INJECTION INTRAVENOUS; SUBCUTANEOUS at 08:57

## 2021-01-27 RX ADMIN — SODIUM CHLORIDE 100 MILLILITER(S): 9 INJECTION, SOLUTION INTRAVENOUS at 16:08

## 2021-01-27 NOTE — PROGRESS NOTE ADULT - SUBJECTIVE AND OBJECTIVE BOX
NEPHROLOGY INTERVAL HPI/OVERNIGHT EVENTS:  01-27-21 @ 15:01  1/27 open eyes to commands, appears comfortable   1/26--wakes up easily when called.  Unable to answer any questions.  No apparent distress.  1/25--No acute events overnight.  CRISTELA slowly improving.  No hx of Dementia per pt's daughter.  Pt awake but unable to answer any questions with garbled speech.    HPI:  89 M with PMH of Afib, not currently on AC's, BPH,  post hospitalization s/p NSTEMI d/c to subacute rehab presenting to the ED from rehab with AMS, SOB. Per rehab, patient was found today in bed mumbling, not making sense with associated SOB. Sent patient to the ED for further evaluation. Pt is on baby ASA. Unable to obtain full HPI secondary to patients AMS. Admit for HCAP    hx fromchart  pt admitted for cristela with hypernatremia   hx of ? ckd with recent scr of 1.5      MEDICATIONS  (STANDING):  aspirin enteric coated 81 milliGRAM(s) Oral daily  cefepime  Injectable. 1000 milliGRAM(s) IV Push every 12 hours  dorzolamide 2%/timolol 0.5% Ophthalmic Solution 1 Drop(s) Both EYES two times a day  heparin   Injectable 5000 Unit(s) SubCutaneous every 12 hours  latanoprost 0.005% Ophthalmic Solution 1 Drop(s) Both EYES at bedtime  sodium chloride 0.45%. 1000 milliLiter(s) (100 mL/Hr) IV Continuous <Continuous>  tamsulosin 0.4 milliGRAM(s) Oral at bedtime    MEDICATIONS  (PRN):  acetaminophen   Tablet .. 650 milliGRAM(s) Oral every 6 hours PRN Temp greater or equal to 38.5C (101.3F), Mild Pain (1 - 3)  acetaminophen  Suppository .. 650 milliGRAM(s) Rectal every 6 hours PRN Temp greater or equal to 38.5C (101.3F)  aluminum hydroxide/magnesium hydroxide/simethicone Suspension 30 milliLiter(s) Oral every 4 hours PRN Dyspepsia  ondansetron Injectable 4 milliGRAM(s) IV Push every 6 hours PRN Nausea      Allergies    No Known Allergies    Intolerances        I&O's Detail    26 Jan 2021 07:01  -  27 Jan 2021 07:00  --------------------------------------------------------  IN:    sodium chloride 0.45%: 1900 mL  Total IN: 1900 mL    OUT:    Indwelling Catheter - Urethral (mL): 650 mL  Total OUT: 650 mL    Total NET: 1250 mL        Vital Signs Last 24 Hrs  T(C): 35.7 (27 Jan 2021 08:43), Max: 36.4 (26 Jan 2021 20:44)  T(F): 96.3 (27 Jan 2021 08:43), Max: 97.6 (27 Jan 2021 05:36)  HR: 63 (27 Jan 2021 08:43) (52 - 69)  BP: 162/70 (27 Jan 2021 08:43) (146/55 - 166/78)  BP(mean): --  RR: 18 (27 Jan 2021 08:43) (17 - 18)  SpO2: 92% (27 Jan 2021 08:43) (92% - 99%)  Daily     Daily     PHYSICAL EXAM:  General: alert. awake   HEENT: dryMM  CV: s1s2 rrr  LUNGS: B/L CTA  EXT: no edema    LABS:                        11.9   19.76 )-----------( 229      ( 27 Jan 2021 08:46 )             39.2     01-27    141  |  115<H>  |  92<H>  ----------------------------<  62<L>  4.9   |  13<L>  |  2.46<H>    Ca    10.9<H>      27 Jan 2021 08:46  Phos  5.3     01-27    TPro  x   /  Alb  1.4<L>  /  TBili  x   /  DBili  x   /  AST  x   /  ALT  x   /  AlkPhos  x   01-27        Phosphorus Level, Serum: 5.3 mg/dL (01-27 @ 08:46)

## 2021-01-27 NOTE — CONSULT NOTE ADULT - SUBJECTIVE AND OBJECTIVE BOX
Patient is a 89y old  Male who admitted on 1/19/21  with a chief complaint of lethargy.      HPI:  89 M with PMH of Afib, not currently on AC's, BPH,  post hospitalization s/p NSTEMI d/c to subacute rehab presenting to the ED from rehab with AMS, SOB. Per rehab, patient was found in his bed mumbling, not making sense with associated SOB. He was admitted with hospital acquired pneumonia.  He has had renal failure and hypernatremia during the admission.  Neurology was consulted for altered mental status.        PAST MEDICAL & SURGICAL HISTORY:  Atrial fibrillation  Macular degeneration  History of BPH  Glaucoma  Inguinal hernia  left  History of prostate surgery  2019  S/P ORIF (open reduction internal fixation) fracture  right leg 1988        FAMILY HISTORY:      Social Hx:  Nonsmoker, no drug or alcohol use    MEDICATIONS  (STANDING):  aspirin enteric coated 81 milliGRAM(s) Oral daily  calcitonin Injectable 290 International Unit(s) IntraMuscular every 12 hours  cefepime  Injectable. 1000 milliGRAM(s) IV Push every 12 hours  dextrose 5%. 1000 milliLiter(s) (100 mL/Hr) IV Continuous <Continuous>  dorzolamide 2%/timolol 0.5% Ophthalmic Solution 1 Drop(s) Both EYES two times a day  heparin   Injectable 5000 Unit(s) SubCutaneous every 12 hours  latanoprost 0.005% Ophthalmic Solution 1 Drop(s) Both EYES at bedtime  tamsulosin 0.4 milliGRAM(s) Oral at bedtime       Allergies    No Known Allergies    Intolerances        ROS: Pertinent positives in HPI, all other ROS were reviewed and are negative.      Vital Signs Last 24 Hrs  T(C): 36.2 (24 Jan 2021 08:24), Max: 36.3 (23 Jan 2021 18:27)  T(F): 97.2 (24 Jan 2021 08:24), Max: 97.3 (23 Jan 2021 18:27)  HR: 57 (24 Jan 2021 08:24) (57 - 68)  BP: 159/67 (24 Jan 2021 08:24) (144/63 - 159/67)  BP(mean): --  RR: 18 (24 Jan 2021 08:24) (18 - 18)  SpO2: 97% (24 Jan 2021 08:24) (91% - 100%)        Constitutional: awake and alert.  HEENT: PERRLA, EOMI,   Neck: Supple.  Respiratory: Breath sounds are clear bilaterally  Cardiovascular: S1 and S2, regular / irregular rhythm  Gastrointestinal: soft, nontender  Extremities:  no edema  Vascular: Caritid Bruit - no  Musculoskeletal: no joint swelling/tenderness, no abnormal movements  Skin: No rashes    Neurological exam:  HF: Awake and alert. Makes eye contact. Able to state name, but then perseverates on name when asked other questions. Asked to repeat, "Today is a very nice day," and he said, "Today is nice day".   Does not name objects or follow instructions.   CN: MED, EOMI, VFF, facial sensation normal, no NLFD, tongue midline, Palate moves equally, SCM equal bilaterally  Motor: not cooperative with confrontation testing, but moves all extremities  Sens: withdraws to noxious stimuli in all four extremiteis  Reflexes: Symmetric and normal . BJ 1, BR 1, KJ 1, plantars flexor b/l  Coord:  unable to cooperate. No involuntary movements noted.  Gait/Balance: cannot test        Labs:   01-23    146<H>  |  120<H>  |  120<H>  ----------------------------<  92  4.6   |  18<L>  |  3.21<H>    Ca    10.5<H>      23 Jan 2021 10:33  Phos  3.7     01-23  Mg     2.4     01-23    TPro  6.3  /  Alb  1.4<L>  /  TBili  1.0  /  DBili  x   /  AST  22  /  ALT  25  /  AlkPhos  182<H>  01-23                              12.5   16.24 )-----------( 251      ( 23 Jan 2021 10:33 )             39.1       Radiology:  CT head 1/19/21:  Limited exam due to motion  No acute intracranial hemorrhage or acute territorial infarct.  If symptoms persist, follow-up MRI exam recommended.    CT head 1/21/21: no acute intracranial findings  
  HPI: Mr. Long is an 89y old Male with hx of Afib (not on AC), BPH, w/ recent NSTEMI at [facility] (found after pt admitted there for a fall) and was dc'd to [facility]. Now admitted at  on 1/19 for AMS and SOB. Found to have CXR showing R perihilar infiltrate, COVID-19 negative, CTH negative for acute pathology, leukocytosis (wbc>30) and CRISTELA with Cr>4. Palliative Care consulted to assist with establishing GOC.     Met Mr. Long this am. He was awake, but still lethargic and confused. He was able to say no to pain, but was unable to answer any other questions or follow commands. RN at bedside noting the same. Appeared comfortable though ill. Plan to arrange GOC conversation with family.       PAIN: ( )Yes   ( x)No    DYSPNEA: ( ) Yes  (x ) No    PAST MEDICAL & SURGICAL HISTORY:  Atrial fibrillation  Macular degeneration  History of BPH  Glaucoma  Inguinal hernia  left  History of prostate surgery  2019  S/P ORIF (open reduction internal fixation) fracture  right leg 1988        SOCIAL HX:     Hx opiate tolerance ( )YES  ( )NO- unable to gather    Baseline ADLs  (Prior to Admission)- as per case management notes...pt lives alone, is independent with all ADLs and IADLs  (x ) Independent   ( )Dependent    FAMILY HISTORY:  unable to gather    Review of Systems:    Unable to gather 2/2 to altered mental status      PHYSICAL EXAM:    Vital Signs Last 24 Hrs  T(C): 36.5 (20 Jan 2021 09:07), Max: 36.6 (19 Jan 2021 15:27)  T(F): 97.7 (20 Jan 2021 09:07), Max: 97.9 (19 Jan 2021 15:27)  HR: 56 (20 Jan 2021 09:54) (47 - 59)  BP: 139/58 (20 Jan 2021 09:07) (108/85 - 139/58)  BP(mean): 89 (19 Jan 2021 20:56) (70 - 89)  RR: 18 (20 Jan 2021 09:07) (15 - 18)  SpO2: 95% (20 Jan 2021 09:07) (93% - 100%)  Daily Height in cm: 158.75 (19 Jan 2021 13:40)    Daily     PPSV2: 30  %    General: Elderly male lying in bed, ill-appearing, awake, but distracted and not saying much, mumbling  Mental Status: answers to name, unable to discern further  HEENT: dmm, + temporal wasting  Lungs: dec at bases bl, + rhonchi and upper airway mucus  Cardiac: + s1 s2 rrr  GI: soft nt nd +bs  : incontinent  MSK/skin: moves all extremities spontaneously, some edema in feet, some muscle and fat wasting noted in extremities  Neuro: limited by mentation, responds to touch      LABS:                        12.4   26.79 )-----------( 283      ( 20 Jan 2021 07:52 )             38.3     01-20    149<H>  |  122<H>  |  149<H>  ----------------------------<  71  4.5   |  16<L>  |  4.17<H>    Ca    10.7<H>      20 Jan 2021 07:52    TPro  6.9  /  Alb  1.8<L>  /  TBili  2.1<H>  /  DBili  x   /  AST  63<H>  /  ALT  45  /  AlkPhos  266<H>  01-19    PT/INR - ( 19 Jan 2021 14:30 )   PT: 15.8 sec;   INR: 1.37 ratio         PTT - ( 19 Jan 2021 14:30 )  PTT:38.2 sec  Albumin: Albumin, Serum: 1.8 g/dL (01-19 @ 14:30)      Allergies    No Known Allergies    Intolerances      MEDICATIONS  (STANDING):  aspirin enteric coated 81 milliGRAM(s) Oral daily  cefepime  Injectable. 1000 milliGRAM(s) IV Push every 12 hours  dorzolamide 2%/timolol 0.5% Ophthalmic Solution 1 Drop(s) Both EYES two times a day  heparin   Injectable 5000 Unit(s) SubCutaneous every 12 hours  latanoprost 0.005% Ophthalmic Solution 1 Drop(s) Both EYES at bedtime  lisinopril 10 milliGRAM(s) Oral daily  sodium chloride 0.9%. 1000 milliLiter(s) (80 mL/Hr) IV Continuous <Continuous>    MEDICATIONS  (PRN):  acetaminophen   Tablet .. 650 milliGRAM(s) Oral every 6 hours PRN Temp greater or equal to 38.5C (101.3F), Mild Pain (1 - 3)  acetaminophen  Suppository .. 650 milliGRAM(s) Rectal every 6 hours PRN Temp greater or equal to 38.5C (101.3F)  aluminum hydroxide/magnesium hydroxide/simethicone Suspension 30 milliLiter(s) Oral every 4 hours PRN Dyspepsia  ondansetron Injectable 4 milliGRAM(s) IV Push every 6 hours PRN Nausea      RADIOLOGY/ADDITIONAL STUDIES:    EXAM:  XR CHEST PORTABLE URGENT 1V                        PROCEDURE DATE:  01/19/2021      IMPRESSION: Vague right perihilar infiltrate.    ALMA DELIA SHAY MD; Attending Radiologist  This document has been electronically signed. Jan 19 2021  2:26PM      EXAM:  CT BRAIN                        PROCEDURE DATE:  01/19/2021      IMPRESSION:  Limited exam due to motion    No acute intracranial hemorrhage or acute territorial infarct.  If symptoms persist, follow-up MRI exam recommended.    NENA GHOTRA MD; Attending Radiologist  This document has been electronically signed. Jan 19 2021  2:45PM    
NEPHROLOGY INTERVAL HPI/OVERNIGHT EVENTS:  SONY [id number]  HPI:  89 M with PMH of Afib, not currently on AC's, BPH,  post hospitalization s/p NSTEMI d/c to subacute rehab presenting to the ED from rehab with AMS, SOB. Per rehab, patient was found today in bed mumbling, not making sense with associated SOB. Sent patient to the ED for further evaluation. Pt is on baby ASA. Unable to obtain full HPI secondary to patients AMS. Admit for HCAP     (2021 15:49)      Patient is a 89y old  Male who presents with a chief complaint of lethargy/AMS (2021 15:28)  ------------  hx fromchart  pt admitted for yang with hypernatremia   hx of ? ckd with recent scr of 1.5      PAST MEDICAL & SURGICAL HISTORY:  Atrial fibrillation  Macular degeneration  History of BPH  Glaucoma  Inguinal hernia  left  History of prostate surgery  2019  S/P ORIF (open reduction internal fixation) fracture  right leg     FAMILY HISTORY:      MEDICATIONS  (STANDING):  aspirin enteric coated 81 milliGRAM(s) Oral daily  cefepime  Injectable. 1000 milliGRAM(s) IV Push every 12 hours  dextrose 5% + sodium chloride 0.9% 1000 milliLiter(s) (75 mL/Hr) IV Continuous <Continuous>  dextrose 5%. 1000 milliLiter(s) (100 mL/Hr) IV Continuous <Continuous>  dorzolamide 2%/timolol 0.5% Ophthalmic Solution 1 Drop(s) Both EYES two times a day  heparin   Injectable 5000 Unit(s) SubCutaneous every 12 hours  latanoprost 0.005% Ophthalmic Solution 1 Drop(s) Both EYES at bedtime    MEDICATIONS  (PRN):  acetaminophen   Tablet .. 650 milliGRAM(s) Oral every 6 hours PRN Temp greater or equal to 38.5C (101.3F), Mild Pain (1 - 3)  acetaminophen  Suppository .. 650 milliGRAM(s) Rectal every 6 hours PRN Temp greater or equal to 38.5C (101.3F)  aluminum hydroxide/magnesium hydroxide/simethicone Suspension 30 milliLiter(s) Oral every 4 hours PRN Dyspepsia  ondansetron Injectable 4 milliGRAM(s) IV Push every 6 hours PRN Nausea      Allergies    No Known Allergies    Intolerances        I&O's Summary      Home Medications:  acetaminophen 500 mg oral tablet: 1 tab(s) orally every 4 hours, As Needed (2021 15:45)  dorzolamide-timolol 2.23%-0.68% ophthalmic solution: 1 drop(s) in each eye 2 times a day (2021 15:45)  ipratropium-albuterol 0.5 mg-2.5 mg/3 mLinhalation solution: 3 milliliter(s) inhaled 4 times a day, As Needed (2021 15:45)  latanoprost 0.005% ophthalmic solution: 1 drop(s) to each affected eye once a day (in the evening) (2021 15:45)  lisinopril 10 mg oral tablet: 1 tab(s) orally once a day (2021 15:45)    Vital Signs Last 24 Hrs  T(C): 35.9 (2021 15:35), Max: 36.5 (2021 09:07)  T(F): 96.6 (2021 15:35), Max: 97.7 (2021 09:07)  HR: 62 (2021 15:35) (47 - 62)  BP: 140/72 (2021 15:35) (108/85 - 140/72)  BP(mean): 89 (2021 20:56) (89 - 89)  RR: 18 (2021 15:35) (16 - 18)  SpO2: 98% (2021 15:35) (95% - 100%)  Daily     Daily   I&O's Summary      PHYSICAL EXAM:  GEN: encephalopahty  HEENT: dryMM  NECK supple no jvd  CV: RRR s1s2  LUNGS: b/l CTA  ABD: + soft,   EXT: no edema    LABS:                        13.8   23.11 )-----------( 292      ( 2021 13:06 )             43.2     01-20    150<H>  |  123<H>  |  148<H>  ----------------------------<  76  4.8   |  19<L>  |  4.16<H>    Ca    11.1<H>      2021 13:06  Phos  5.5       Mg     2.8         TPro  6.9  /  Alb  1.8<L>  /  TBili  2.1<H>  /  DBili  x   /  AST  63<H>  /  ALT  45  /  AlkPhos  266<H>      PT/INR - ( 2021 14:30 )   PT: 15.8 sec;   INR: 1.37 ratio         PTT - ( 2021 14:30 )  PTT:38.2 sec  Urinalysis Basic - ( 2021 15:21 )    Color: Fabby / Appearance: Slightly Turbid / S.020 / pH: x  Gluc: x / Ketone: Negative  / Bili: Negative / Urobili: Negative mg/dL   Blood: x / Protein: 100 mg/dL / Nitrite: Negative   Leuk Esterase: Small / RBC: >50 /HPF / WBC 26-50   Sq Epi: x / Non Sq Epi: Occasional / Bacteria: Moderate      Magnesium, Serum: 2.8 mg/dL ( @ 13:06)  Phosphorus Level, Serum: 5.5 mg/dL ( @ 13:06)      
Patient is a 89y old  Male who presents with a chief complaint of lethargy (27 Jan 2021 15:01)      HPI:  89 M with PMH of Afib, not currently on AC's, BPH,  post hospitalization s/p NSTEMI d/c to subacute rehab presenting to the ED from rehab with AMS, SOB.  Patient found to be in renal failure due to obstructive uropathy. He has evidence of pneumonia possibly due to aspiration  Patient lethargic and noncommunicative  Found to have low level M protein  Patient was also hypercalcemic initially With a calcium level of 11.7  PTH 32 not suppressed  Consult requested for evaluation for Plasma cell disorder      PAST MEDICAL & SURGICAL HISTORY:  Atrial fibrillation    Macular degeneration    History of BPH    Glaucoma    Inguinal hernia  left    History of prostate surgery  2019    S/P ORIF (open reduction internal fixation) fracture  right leg 1988        REVIEW OF SYSTEMS    Unable to obtain  Patient lethargic and noncommunicative          FAMILY HISTORY:      Home Medications:  acetaminophen 500 mg oral tablet: 1 tab(s) orally every 4 hours, As Needed (19 Jan 2021 15:45)  dorzolamide-timolol 2.23%-0.68% ophthalmic solution: 1 drop(s) in each eye 2 times a day (19 Jan 2021 15:45)  ipratropium-albuterol 0.5 mg-2.5 mg/3 mLinhalation solution: 3 milliliter(s) inhaled 4 times a day, As Needed (19 Jan 2021 15:45)  latanoprost 0.005% ophthalmic solution: 1 drop(s) to each affected eye once a day (in the evening) (19 Jan 2021 15:45)  lisinopril 10 mg oral tablet: 1 tab(s) orally once a day (19 Jan 2021 15:45)      MEDICATIONS  (STANDING):  amLODIPine   Tablet 2.5 milliGRAM(s) Oral daily  aspirin enteric coated 81 milliGRAM(s) Oral daily  cefepime  Injectable. 1000 milliGRAM(s) IV Push every 12 hours  dorzolamide 2%/timolol 0.5% Ophthalmic Solution 1 Drop(s) Both EYES two times a day  heparin   Injectable 5000 Unit(s) SubCutaneous every 12 hours  latanoprost 0.005% Ophthalmic Solution 1 Drop(s) Both EYES at bedtime  sodium bicarbonate 650 milliGRAM(s) Oral two times a day  sodium chloride 0.45%. 1000 milliLiter(s) (100 mL/Hr) IV Continuous <Continuous>  tamsulosin 0.4 milliGRAM(s) Oral at bedtime    MEDICATIONS  (PRN):  acetaminophen   Tablet .. 650 milliGRAM(s) Oral every 6 hours PRN Temp greater or equal to 38.5C (101.3F), Mild Pain (1 - 3)  acetaminophen  Suppository .. 650 milliGRAM(s) Rectal every 6 hours PRN Temp greater or equal to 38.5C (101.3F)  aluminum hydroxide/magnesium hydroxide/simethicone Suspension 30 milliLiter(s) Oral every 4 hours PRN Dyspepsia  ondansetron Injectable 4 milliGRAM(s) IV Push every 6 hours PRN Nausea      PHYSICAL EXAM:  Vital Signs Last 24 Hrs  T(C): 35.7 (27 Jan 2021 08:43), Max: 36.4 (26 Jan 2021 20:44)  T(F): 96.3 (27 Jan 2021 08:43), Max: 97.6 (27 Jan 2021 05:36)  HR: 63 (27 Jan 2021 08:43) (52 - 69)  BP: 162/70 (27 Jan 2021 08:43) (146/55 - 162/70)  BP(mean): --  RR: 18 (27 Jan 2021 08:43) (17 - 18)  SpO2: 92% (27 Jan 2021 08:43) (92% - 99%)      Gen:  Elderly gentleman who is lethargic and noncommunicative  Diffuse crackles in the right lung  No lymphadenopathy or hepatosplenomegaly      LABS:                        11.9   19.76 )-----------( 229      ( 27 Jan 2021 08:46 )             39.2     27 Jan 2021 08:46    141    |  115    |  92     ----------------------------<  62     4.9     |  13     |  2.46     Ca    10.9       27 Jan 2021 08:46  Phos  5.3       27 Jan 2021 08:46    TPro  x      /  Alb  1.4    /  TBili  x      /  DBili  x      /  AST  x      /  ALT  x      /  AlkPhos  x      27 Jan 2021 08:46    LIVER FUNCTIONS - ( 27 Jan 2021 08:46 )  Alb: 1.4 g/dL / Pro: x     / ALK PHOS: x     / ALT: x     / AST: x     / GGT: x           Free lambda light chain 13 Free kappa light chain 11  Ratio 0.84    gamma Migrating paraprotein present  Not quantified    Covid antibody positive  Patient was also hypercalcemic initially With a calcium level of 11.7  PTH 32 not suppressed    RADIOLOGY & ADDITIONAL STUDIES:    Vague right perihilar infiltrate

## 2021-01-27 NOTE — PROGRESS NOTE ADULT - SUBJECTIVE AND OBJECTIVE BOX
HPI: Pt seen and examined this am in follow up for sx and GOC. Pt awake, slurring words, lethargic and with food pouring out of side of mouth. He was unable to follow commands and required cleaning of food out of his mouth during encounter to prevent aspiration. As per RN, concern with am labs as pt not really swallowing them or apple sauce. Plan to call daughter today. See GOC note that will follow      PAIN: no nonverbal signs of pain    DYSPNEA: no nonverbal signs of dyspnea      ROS:  Unable to gather      PHYSICAL EXAM:    Vital Signs Last 24 Hrs  T(C): 35.7 (27 Jan 2021 08:43), Max: 36.4 (26 Jan 2021 20:44)  T(F): 96.3 (27 Jan 2021 08:43), Max: 97.6 (27 Jan 2021 05:36)  HR: 63 (27 Jan 2021 08:43) (52 - 69)  BP: 162/70 (27 Jan 2021 08:43) (146/55 - 166/78)  BP(mean): --  RR: 18 (27 Jan 2021 08:43) (17 - 18)  SpO2: 92% (27 Jan 2021 08:43) (92% - 99%)  Daily     Daily     PPSV2: 30  %    General: Elderly male lying in bed, ill-appearing, awake and trying to respond but speech garbled, not yet following commands  Mental Status: answers to name, unable to discern further  HEENT: dmm, + temporal wasting  Lungs: dec at bases bl  Cardiac: + s1 s2 rrr  GI: soft nt nd +bs  : incontinent  MSK/skin: moves all extremities spontaneously, some edema in feet, some muscle and fat wasting noted in extremities  Neuro: limited by mentation, responds to touch, garbled speech    LABS:                        11.9   19.76 )-----------( 229      ( 27 Jan 2021 08:46 )             39.2     01-27    141  |  115<H>  |  92<H>  ----------------------------<  62<L>  4.9   |  13<L>  |  2.46<H>    Ca    10.9<H>      27 Jan 2021 08:46  Phos  5.3     01-27    TPro  x   /  Alb  1.4<L>  /  TBili  x   /  DBili  x   /  AST  x   /  ALT  x   /  AlkPhos  x   01-27      Albumin: Albumin, Serum: 1.4 g/dL (01-27 @ 08:46)      Allergies    No Known Allergies    Intolerances      MEDICATIONS  (STANDING):  aspirin enteric coated 81 milliGRAM(s) Oral daily  cefepime  Injectable. 1000 milliGRAM(s) IV Push every 12 hours  dorzolamide 2%/timolol 0.5% Ophthalmic Solution 1 Drop(s) Both EYES two times a day  heparin   Injectable 5000 Unit(s) SubCutaneous every 12 hours  latanoprost 0.005% Ophthalmic Solution 1 Drop(s) Both EYES at bedtime  sodium chloride 0.45%. 1000 milliLiter(s) (100 mL/Hr) IV Continuous <Continuous>  tamsulosin 0.4 milliGRAM(s) Oral at bedtime    MEDICATIONS  (PRN):  acetaminophen   Tablet .. 650 milliGRAM(s) Oral every 6 hours PRN Temp greater or equal to 38.5C (101.3F), Mild Pain (1 - 3)  acetaminophen  Suppository .. 650 milliGRAM(s) Rectal every 6 hours PRN Temp greater or equal to 38.5C (101.3F)  aluminum hydroxide/magnesium hydroxide/simethicone Suspension 30 milliLiter(s) Oral every 4 hours PRN Dyspepsia  ondansetron Injectable 4 milliGRAM(s) IV Push every 6 hours PRN Nausea      RADIOLOGY:    EXAM:  MR BRAIN                        PROCEDURE DATE:  01/26/2021      IMPRESSION:   Moderate periventricular and deep white matter ischemia. 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 Infarctions are seen within the LEFT external capsule and rosibel.    KIMBERLY VENTURA MD; Attending Radiologist  This document has been electronically signed. Jan 26 2021  9:44AM     HPI: Pt seen and examined this am in follow up for sx and GOC. Pt awake, slurring words, lethargic and with food pouring out of side of mouth. He was unable to follow commands and required cleaning of food out of his mouth during encounter to prevent aspiration. As per RN, concern with am labs as pt not really swallowing them or apple sauce.  See GOC note below      PAIN: no nonverbal signs of pain    DYSPNEA: no nonverbal signs of dyspnea      ROS:  Unable to gather      PHYSICAL EXAM:    Vital Signs Last 24 Hrs  T(C): 35.7 (27 Jan 2021 08:43), Max: 36.4 (26 Jan 2021 20:44)  T(F): 96.3 (27 Jan 2021 08:43), Max: 97.6 (27 Jan 2021 05:36)  HR: 63 (27 Jan 2021 08:43) (52 - 69)  BP: 162/70 (27 Jan 2021 08:43) (146/55 - 166/78)  BP(mean): --  RR: 18 (27 Jan 2021 08:43) (17 - 18)  SpO2: 92% (27 Jan 2021 08:43) (92% - 99%)  Daily     Daily     PPSV2: 30  %    General: Elderly male lying in bed, ill-appearing, awake and trying to respond but speech garbled, not yet following commands  Mental Status: answers to name, unable to discern further  HEENT: dmm, + temporal wasting  Lungs: dec at bases bl  Cardiac: + s1 s2 rrr  GI: soft nt nd +bs  : incontinent  MSK/skin: moves all extremities spontaneously, some edema in feet, some muscle and fat wasting noted in extremities  Neuro: limited by mentation, responds to touch, garbled speech    LABS:                        11.9   19.76 )-----------( 229      ( 27 Jan 2021 08:46 )             39.2     01-27    141  |  115<H>  |  92<H>  ----------------------------<  62<L>  4.9   |  13<L>  |  2.46<H>    Ca    10.9<H>      27 Jan 2021 08:46  Phos  5.3     01-27    TPro  x   /  Alb  1.4<L>  /  TBili  x   /  DBili  x   /  AST  x   /  ALT  x   /  AlkPhos  x   01-27      Albumin: Albumin, Serum: 1.4 g/dL (01-27 @ 08:46)      Allergies    No Known Allergies    Intolerances      MEDICATIONS  (STANDING):  aspirin enteric coated 81 milliGRAM(s) Oral daily  cefepime  Injectable. 1000 milliGRAM(s) IV Push every 12 hours  dorzolamide 2%/timolol 0.5% Ophthalmic Solution 1 Drop(s) Both EYES two times a day  heparin   Injectable 5000 Unit(s) SubCutaneous every 12 hours  latanoprost 0.005% Ophthalmic Solution 1 Drop(s) Both EYES at bedtime  sodium chloride 0.45%. 1000 milliLiter(s) (100 mL/Hr) IV Continuous <Continuous>  tamsulosin 0.4 milliGRAM(s) Oral at bedtime    MEDICATIONS  (PRN):  acetaminophen   Tablet .. 650 milliGRAM(s) Oral every 6 hours PRN Temp greater or equal to 38.5C (101.3F), Mild Pain (1 - 3)  acetaminophen  Suppository .. 650 milliGRAM(s) Rectal every 6 hours PRN Temp greater or equal to 38.5C (101.3F)  aluminum hydroxide/magnesium hydroxide/simethicone Suspension 30 milliLiter(s) Oral every 4 hours PRN Dyspepsia  ondansetron Injectable 4 milliGRAM(s) IV Push every 6 hours PRN Nausea      RADIOLOGY:    EXAM:  MR BRAIN                        PROCEDURE DATE:  01/26/2021      IMPRESSION:   Moderate periventricular and deep white matter ischemia. 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 Infarctions are seen within the LEFT external capsule and rosibel.    KIMBERLY VENTURA MD; Attending Radiologist  This document has been electronically signed. Jan 26 2021  9:44AM

## 2021-01-27 NOTE — PROGRESS NOTE ADULT - SUBJECTIVE AND OBJECTIVE BOX
HPI:  89 M with PMH of Afib, not currently on AC's, BPH,  post hospitalization s/p NSTEMI d/c to subacute rehab presenting to the ED from rehab with AMS, SOB. Per rehab, patient was found today in bed mumbling, not making sense with associated SOB. Sent patient to the ED for further evaluation. Pt is on baby ASA. Unable to obtain full HPI secondary to patients AMS. Admit for HCAP      SUBJECTIVE: Limited ROS/CC as patient is AMS.     1/20-1/21:Overnight Bladder US ~1600cc in bladder -> Simons insertion with 1400cc drained.   1/22: More energy; Still confused and speech is still off. Unsure whether expressive aphasia is related to neurologic event (patient has risks including Afib not on AC) vs Metabolic. Limited ROS/CC  1:23: more lethargic today. Producing less words. Urine output adequate. Cr and Na improving.   1/24: some improvement; speech not coherent; no apparent new focal deficit; Patient resting comfortabley  1/25: No significant change; speech not coherent; no apparent new focal deficit; Patient resting comfortabley; follow minimal commands  01/26/21: Patient seen and examined after coming back from MRI, sleepy, received IV ativan prior to MRI.   1/27: pt seen and examined at bedside. resting comfortably, denies pain or discomfort.     ROS   All 10 systems reviewed and found to be negative with the exception of what has been described above.  limited due to aphasia       PHYSICAL EXAM:    Vital Signs Last 24 Hrs  T(C): 35.9 (26 Jan 2021 08:13), Max: 36.1 (25 Jan 2021 16:12)  T(F): 96.6 (26 Jan 2021 08:13), Max: 96.9 (25 Jan 2021 16:12)  HR: 59 (26 Jan 2021 08:13) (58 - 65)  BP: 177/86 (26 Jan 2021 08:13) (165/78 - 177/86)  BP(mean): --  RR: 18 (26 Jan 2021 08:13) (18 - 19)  SpO2: 98% (26 Jan 2021 08:13) (98% - 100%)      General: AAOx0; NAD; lethargic  Head: AT/NC, speech limited   ENT: Dry mucous membranes No Injury  Neck: Non-tender; No JVD  CVS: RRR, S1&S2, No murmur, b/L LE Edema  Respiratory: Decreased bibasilar breath sounds; Normal Respiratory Effort  Abdomen/GI: Soft, non-tender, non-distended, normal bowel sounds  : No bladder distention, Simons in place with dark concentrated urine  Extremites: No cyanosis, No clubbing, LE Edema  Neuro: AAOx1, Limited exam as patient not cooperative.   Psych: Unable to evaluate  Skin: Clean, Dry and Intact                              12.7   16.84 )-----------( 243      ( 26 Jan 2021 07:52 )             39.3     26 Jan 2021 07:52    140    |  114    |  96     ----------------------------<  63     4.8     |  16     |  2.63     Ca    10.2       26 Jan 2021 07:52  Phos  4.2       26 Jan 2021 07:52    TPro  x      /  Alb  1.5    /  TBili  x      /  DBili  x      /  AST  x      /  ALT  x      /  AlkPhos  x      26 Jan 2021 07:52    LIVER FUNCTIONS - ( 26 Jan 2021 07:52 )  Alb: 1.5 g/dL / Pro: x     / ALK PHOS: x     / ALT: x     / AST: x     / GGT: x             MEDICATIONS  (STANDING):  aspirin enteric coated 81 milliGRAM(s) Oral daily  cefepime  Injectable. 1000 milliGRAM(s) IV Push every 12 hours  dorzolamide 2%/timolol 0.5% Ophthalmic Solution 1 Drop(s) Both EYES two times a day  heparin   Injectable 5000 Unit(s) SubCutaneous every 12 hours  latanoprost 0.005% Ophthalmic Solution 1 Drop(s) Both EYES at bedtime  sodium chloride 0.45%. 1000 milliLiter(s) (100 mL/Hr) IV Continuous <Continuous>  tamsulosin 0.4 milliGRAM(s) Oral at bedtime    MEDICATIONS  (PRN):  acetaminophen   Tablet .. 650 milliGRAM(s) Oral every 6 hours PRN Temp greater or equal to 38.5C (101.3F), Mild Pain (1 - 3)  acetaminophen  Suppository .. 650 milliGRAM(s) Rectal every 6 hours PRN Temp greater or equal to 38.5C (101.3F)  aluminum hydroxide/magnesium hydroxide/simethicone Suspension 30 milliLiter(s) Oral every 4 hours PRN Dyspepsia  ondansetron Injectable 4 milliGRAM(s) IV Push every 6 hours PRN Nausea               HPI:  89 M with PMH of Afib, not currently on AC's, BPH,  post hospitalization s/p NSTEMI d/c to subacute rehab presenting to the ED from rehab with AMS, SOB. Per rehab, patient was found today in bed mumbling, not making sense with associated SOB. Sent patient to the ED for further evaluation. Pt is on baby ASA. Unable to obtain full HPI secondary to patients AMS. Admit for HCAP      SUBJECTIVE: Limited ROS/CC as patient is AMS.     1/20-1/21:Overnight Bladder US ~1600cc in bladder -> Simons insertion with 1400cc drained.   1/22: More energy; Still confused and speech is still off. Unsure whether expressive aphasia is related to neurologic event (patient has risks including Afib not on AC) vs Metabolic. Limited ROS/CC  1:23: more lethargic today. Producing less words. Urine output adequate. Cr and Na improving.   1/24: some improvement; speech not coherent; no apparent new focal deficit; Patient resting comfortabley  1/25: No significant change; speech not coherent; no apparent new focal deficit; Patient resting comfortabley; follow minimal commands  01/26/21: Patient seen and examined after coming back from MRI, sleepy, received IV ativan prior to MRI.   1/27: pt seen and examined at bedside. resting comfortably, denies pain or discomfort.     ROS   All 10 systems reviewed and found to be negative with the exception of what has been described above.  limited due to aphasia       PHYSICAL EXAM:    Vital Signs Last 24 Hrs  T(C): 35.9 (26 Jan 2021 08:13), Max: 36.1 (25 Jan 2021 16:12)  T(F): 96.6 (26 Jan 2021 08:13), Max: 96.9 (25 Jan 2021 16:12)  HR: 59 (26 Jan 2021 08:13) (58 - 65)  BP: 177/86 (26 Jan 2021 08:13) (165/78 - 177/86)  BP(mean): --  RR: 18 (26 Jan 2021 08:13) (18 - 19)  SpO2: 98% (26 Jan 2021 08:13) (98% - 100%)      General: AAOx0; NAD; lethargic  Head: AT/NC, speech limited   ENT: Dry mucous membranes No Injury  Neck: Non-tender; No JVD  CVS: RRR, S1&S2, No murmur, b/L LE Edema  Respiratory: Decreased bibasilar breath sounds; Normal Respiratory Effort  Abdomen/GI: Soft, non-tender, non-distended, normal bowel sounds  : No bladder distention, Simons in place with dark concentrated urine  Extremites: No cyanosis, No clubbing, LE Edema  Neuro: AAOx1, Limited exam as patient not cooperative.   Psych: Unable to evaluate  Skin: Clean, Dry and Intact      LABS                         12.7   16.84 )-----------( 243      ( 26 Jan 2021 07:52 )             39.3     26 Jan 2021 07:52    140    |  114    |  96     ----------------------------<  63     4.8     |  16     |  2.63     Ca    10.2       26 Jan 2021 07:52  Phos  4.2       26 Jan 2021 07:52    TPro  x      /  Alb  1.5    /  TBili  x      /  DBili  x      /  AST  x      /  ALT  x      /  AlkPhos  x      26 Jan 2021 07:52    LIVER FUNCTIONS - ( 26 Jan 2021 07:52 )  Alb: 1.5 g/dL / Pro: x     / ALK PHOS: x     / ALT: x     / AST: x     / GGT: x             MEDICATIONS  (STANDING):  aspirin enteric coated 81 milliGRAM(s) Oral daily  cefepime  Injectable. 1000 milliGRAM(s) IV Push every 12 hours  dorzolamide 2%/timolol 0.5% Ophthalmic Solution 1 Drop(s) Both EYES two times a day  heparin   Injectable 5000 Unit(s) SubCutaneous every 12 hours  latanoprost 0.005% Ophthalmic Solution 1 Drop(s) Both EYES at bedtime  sodium chloride 0.45%. 1000 milliLiter(s) (100 mL/Hr) IV Continuous <Continuous>  tamsulosin 0.4 milliGRAM(s) Oral at bedtime    MEDICATIONS  (PRN):  acetaminophen   Tablet .. 650 milliGRAM(s) Oral every 6 hours PRN Temp greater or equal to 38.5C (101.3F), Mild Pain (1 - 3)  acetaminophen  Suppository .. 650 milliGRAM(s) Rectal every 6 hours PRN Temp greater or equal to 38.5C (101.3F)  aluminum hydroxide/magnesium hydroxide/simethicone Suspension 30 milliLiter(s) Oral every 4 hours PRN Dyspepsia  ondansetron Injectable 4 milliGRAM(s) IV Push every 6 hours PRN Nausea

## 2021-01-27 NOTE — PROGRESS NOTE ADULT - ASSESSMENT
89y old Male with hx of Afib (not on AC), BPH, w/ recent NSTEMI at [facility] (found after pt admitted there for a fall) and was dc'd to [facility] on 1/13. Now admitted at  on 1/19 for AMS and SOB. Found to have CXR showing R perihilar infiltrate, COVID-19 negative, CTH negative for acute pathology, leukocytosis (wbc>30) and CRISTELA with Cr>4. Palliative Care consulted to assist with establishing GOC.     1) AMS  - daughter clarified for primary team on admission that pt does not have dementia  - likely encephalopathy/hypoactive delirium from underlying infection and metabolic abnormalities  - CTH negative--> repeat also negative  - however still with garbled speech which may be due to kidney dysfunction. ?MRI if does not improve with improving renal function  - neurology now also consulted   - eeg done with generalized background slowing c/w encephalopathy and no evidence of seizure  - pt went for MRI yesterday   - fall and aspiration precautions    2) HCAP  - CXR showing infiltrate on R  - IV abx  - supplemental O2 as needed  - can also consider addition of mucinex for audible secretions  - COVID negative x2  - WBC improved    3) CRISTELA  - unclear, possibly do to poor po intake  - renal US showed retention of >1L--> reilly in place now draining well   - nephrology consult appreciated, fluids adjusted, monitoring  - Cr elevated>4, but slowly improving. Likely to continue improving with reilly placed  - avoid nephrotoxic agents as able  - also would consider that pt just had NSTEMI    4) Recent NSTEMI  - recently treated for NSTEMI at outside hospital  - ? echo  - ? cardio to follow up and ensure this is not contributing?    5) Debility  - PPS<40%  - speech and swallow eval appreciated, awaiting impression  - suggest nutrition eval given evidence of malnutrition on exam   - as per care coordination notes pt fully independent and living alone, with visits from daughter who helps with errands and appointments, pt does not drive  - PT consulted as pt is coming from rehab-->max assist, not following commands, MIKE recommended    6) Prognosis  - guarded  - pt with advanced age, significant comorbidities, including recent hospitalization for NSTEMI, now with evidence of multiorgan compromise, recent fall, PPS<40% at this time, albumin 1.8 with evidence of malnutrition on exam; pt at increased risk for complications    7) GOC/Advanced Directives  - pt does not currently have capacity for decision making, but according to EMR, pt was was cognitively intact though very [place] prior  - surrogate decision maker is daughter Ayesha Le [phone]  - MOL on chart 1/19: DNR and DNI  - GOC meeting initiated 1/20- plan to give pt more time to see if he can improve, as desired plan is for MIKE and home. Follow up meeting held 1/22 to touch base/update leading to same goals. See GOC note that will follow. Will call again tomorrow once MRI resulted to discuss goals again since pt's mentation has not seemed to have improved (though still uremic).     Thank you for including us in Mr. Long's care. Will continue to follow with you.    Ridge Oakley MD  Palliative Care Attending   89y old Male with hx of Afib (not on AC), BPH, w/ recent NSTEMI at [facility] (found after pt admitted there for a fall) and was dc'd to [facility] on 1/13. Now admitted at  on 1/19 for AMS and SOB. Found to have CXR showing R perihilar infiltrate, COVID-19 negative, CTH negative for acute pathology, leukocytosis (wbc>30) and CRISTELA with Cr>4. Palliative Care consulted to assist with establishing GOC.     1) AMS  - daughter clarified for primary team on admission that pt does not have dementia  - likely encephalopathy/hypoactive delirium from underlying infection and metabolic abnormalities  - CTH negative--> repeat also negative  - however still with garbled speech which may be due to kidney dysfunction.  - neurology now also consulted--> no acute infarct on MRI  - eeg done with generalized background slowing c/w encephalopathy and no evidence of seizure  - fall and aspiration precautions    2) HCAP  - CXR showing infiltrate on R  - IV abx  - supplemental O2 as needed  - can also consider addition of mucinex for audible secretions  - COVID negative x2  - WBC improved initially and now on the rise again. Seems possible that this could be due to microaspiration considering pt having trouble with swallowing today    3) CRISTELA  - unclear, possibly do to poor po intake  - renal US showed retention of >1L--> reilly in place now draining well   - nephrology consult appreciated, fluids adjusted, monitoring  - Cr elevated>4, but slowly improving. Likely to continue improving with reilly placed  - avoid nephrotoxic agents as able  - also would consider that pt just had NSTEMI    4) Recent NSTEMI  - recently treated for NSTEMI at outside hospital  - ? echo  - ? cardio to follow up and ensure this is not contributing?    5) Debility  - PPS<40%  - speech and swallow eval appreciated, awaiting impression  - suggest nutrition eval given evidence of malnutrition on exam   - as per care coordination notes pt fully independent and living alone, with visits from daughter who helps with errands and appointments, pt does not drive  - PT consulted as pt is coming from rehab-->max assist, not following commands, MIKE recommended    6) Prognosis  - guarded  - pt with advanced age, significant comorbidities, including recent hospitalization for NSTEMI, now with evidence of multiorgan compromise, recent fall, PPS<40% at this time, albumin 1.8 with evidence of malnutrition on exam; pt at increased risk for complications    7) GOC/Advanced Directives  - pt does not currently have capacity for decision making, but according to EMR, pt was was cognitively intact though very [place] prior  - surrogate decision maker is daughter Ayesha Le [phone]  - MOLST on chart 1/19: DNR and DNI  - GOC meeting initiated 1/20- plan to give pt more time to see if he can improve, as desired plan is for MIKE and home. Follow up meeting held 1/22 to touch base/update leading to same goals. See GOC note that will follow. Will call again today to discuss goals again since pt's mentation has not seemed to have improved (though still uremic) and wbc back on the rise.     Thank you for including us in Mr. Long's care. Will continue to follow with you.    Ridge Oakley MD  Palliative Care Attending   89y old Male with hx of Afib (not on AC), BPH, w/ recent NSTEMI at [facility] (found after pt admitted there for a fall) and was dc'd [facility] on 1/13. Now admitted at  on 1/19 for AMS and SOB. Found to have CXR showing R perihilar infiltrate, COVID-19 negative, CTH negative for acute pathology, leukocytosis (wbc>30) and CRISTELA with Cr>4. Palliative Care consulted to assist with establishing GOC.     1) AMS  - daughter clarified for primary team on admission that pt does not have dementia  - likely encephalopathy/hypoactive delirium from underlying infection and metabolic abnormalities  - CTH negative--> repeat also negative  - however still with garbled speech which may be due to kidney dysfunction.  - neurology now also consulted--> no acute infarct on MRI  - eeg done with generalized background slowing c/w encephalopathy and no evidence of seizure  - fall and aspiration precautions    2) HCAP  - CXR showing infiltrate on R  - IV abx  - supplemental O2 as needed  - can also consider addition of mucinex for audible secretions  - COVID negative x2  - WBC improved initially and now on the rise again. Seems possible that this could be due to microaspiration considering pt having trouble with swallowing today    3) CRISTELA  - unclear, possibly do to poor po intake  - renal US showed retention of >1L--> reilly in place now draining well   - nephrology consult appreciated, fluids adjusted, monitoring  - Cr elevated>4, but slowly improving. Likely to continue improving with reilly placed  - avoid nephrotoxic agents as able  - also would consider that pt just had NSTEMI    4) Recent NSTEMI  - recently treated for NSTEMI at outside hospital  - ? echo  - ? cardio to follow up and ensure this is not contributing?    5) Debility  - PPS<40%  - speech and swallow eval appreciated, awaiting impression  - suggest nutrition eval given evidence of malnutrition on exam   - as per care coordination notes pt fully independent and living alone, with visits from daughter who helps with errands and appointments, pt does not drive  - PT consulted as pt is coming from rehab-->max assist, not following commands, MIKE recommended    6) Prognosis  - guarded  - pt with advanced age, significant comorbidities, including recent hospitalization for NSTEMI, now with evidence of multiorgan compromise, recent fall, PPS<40% at this time, albumin 1.8 with evidence of malnutrition on exam; pt at increased risk for complications    7) GOC/Advanced Directives  - pt does not currently have capacity for decision making, but according to EMR, pt was was cognitively intact though very [place] prior  - surrogate decision maker is daughter Ayesha Le [phone]  - MOLST on chart 1/19: DNR and DNI  - GOC meeting initiated 1/20- plan to give pt more time to see if he can improve, as desired plan is for MIKE and home. Follow up meeting held 1/22 to touch base/update leading to same goals. See GOC note that will follow. Called Ayesha today to discuss goals again since pt's mentation has not seemed to have improved (though still uremic) and wbc back on the rise. See embedded GO note above for details    Thank you for including us in Mr. Long's care. Will continue to follow with you.    Ridge Oakley MD  Palliative Care Attending

## 2021-01-27 NOTE — CONSULT NOTE ADULT - PROBLEM SELECTOR RECOMMENDATION 9
Very small, migrating paraprotein  Too small to quantify  Elevated free light chains with normal ratio  This pattern is not consistent with a significant plasma cell disorder  Further investigation is not warranted

## 2021-01-27 NOTE — PROGRESS NOTE ADULT - ASSESSMENT
89 year old man admitted with Severe Sepsis secondary to RLL pneumonia HCAP vs Aspiration Pneumonia, CRISTELA due to Sepsis + Obstructive Uropathy, hypernatremia and hypercalcemia likely related to dehydration.     Neurology consulted for altered mental status.  The patient does seem to have an element of aphasia, but it is difficult to discern how much is secondary to encephalopathy. No other focal findings are present.  Creatinine trending down, but no significant improvement in mental status.  EEG suggestive of encephalopathy.  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  Consider changing antibiotics if possible as cefepime may contribute to altered mental status, particularly in the setting of renal failure.    will f/u as needed

## 2021-01-27 NOTE — PROGRESS NOTE ADULT - ASSESSMENT
89 you with recent NSTEMI transferred from SNF with lethargy.  Found to be in CRITSELA with hypernatremia due to dehydration in presence of ace-I  Noted with hypercalcemia   Pna with leukocytosis     PLAN   - change ivf to d5w and inc rate    fu uop currently incontinent    prn bladder scan   - pna abx   - fu trend of calcium level.  if no improvement with current fluid composition, may need to change fluid composition  - palliative discussion noted, to revisit on friday 1/21  Na elevated to 156, On D5W  CRISTELA improving  PNA on abx   hypercalcemia, cont IVF  check PTH r/o primary hyperparathyroidism    1/22  doubt hypercalcemia due to dehydration solely, since calcium up while Na and Creat improving  PTH pending.  If elevated, and primary hyper para suspected, may get cinecalcet 30 mg BID  If PTH suppressed may be due to malignancy, sarcoid etc   -will need other means to treat the underlying dz , calcitonin etc  Low K diet, K up to 5.1 on hydration  Reilly draining good UO  Dr Simmons covering this weekend    1/25 SY  --CRISTELA /CKD :? baseline creat of 1.5. : renal function slowly improving.    --Hypernatremia : improved : change IVF to 1/2 NS to maintain current serum sodium level and for Calcium excretion:   --Hypercalcemia of unclear etiology : Free light chain Kappa/Lambda ratio wnl., follow up serum MANOLO.  --AMS : unclear if all due to metabolic encephalopathy : continue to monitor.    1/26 SY  --CRISTELA/ with unclear CKD : Renal function continues to slowly improve.  --Hypernatremia :  improved and stable.  --Hypercalcemia : Ig G Kappa band positive though unclear if significant with normal Kappa to Lambda ratio.  Will need Heme evaluation.  --AMS : follow up MRI of head.  --Monitor metabolic acidosis.    1/27 MK   - CRISTELA/ undefined ckd: sepsis+ obstruction: improving    indwelling reilly in place   - hypernatremia stable   - hypercalcemia heme eval pending   - AMS mri head neg with hx of dementia  - HTN will start amlodipine        89 you with recent NSTEMI transferred from SNF with lethargy.  Found to be in CRISTELA with hypernatremia due to dehydration in presence of ace-I  Noted with hypercalcemia   Pna with leukocytosis     PLAN   - change ivf to d5w and inc rate    fu uop currently incontinent    prn bladder scan   - pna abx   - fu trend of calcium level.  if no improvement with current fluid composition, may need to change fluid composition  - palliative discussion noted, to revisit on friday 1/21  Na elevated to 156, On D5W  CRISTELA improving  PNA on abx   hypercalcemia, cont IVF  check PTH r/o primary hyperparathyroidism    1/22  doubt hypercalcemia due to dehydration solely, since calcium up while Na and Creat improving  PTH pending.  If elevated, and primary hyper para suspected, may get cinecalcet 30 mg BID  If PTH suppressed may be due to malignancy, sarcoid etc   -will need other means to treat the underlying dz , calcitonin etc  Low K diet, K up to 5.1 on hydration  Reilly draining good UO  Dr Simmons covering this weekend    1/25 SY  --CRISTELA /CKD :? baseline creat of 1.5. : renal function slowly improving.    --Hypernatremia : improved : change IVF to 1/2 NS to maintain current serum sodium level and for Calcium excretion:   --Hypercalcemia of unclear etiology : Free light chain Kappa/Lambda ratio wnl., follow up serum MANOLO.  --AMS : unclear if all due to metabolic encephalopathy : continue to monitor.    1/26 SY  --CRISTELA/ with unclear CKD : Renal function continues to slowly improve.  --Hypernatremia :  improved and stable.  --Hypercalcemia : Ig G Kappa band positive though unclear if significant with normal Kappa to Lambda ratio.  Will need Heme evaluation.  --AMS : follow up MRI of head.  --Monitor metabolic acidosis.    1/27 MK   - CRISTELA/ undefined ckd: sepsis+ obstruction: improving    indwelling reilly in place   - hypernatremia stable   - hypercalcemia heme eval pending   - AMS mri head neg with hx of dementia  - HTN will start amlodipine   - worsening metabolic acidosis start po bicarbonate

## 2021-01-27 NOTE — PROGRESS NOTE ADULT - ASSESSMENT
ASSESSMENT:    Severe Sepsis secondary to RLL pneumonia HCAP vs Aspiration Pneumonia  CRISTELA due to Sepsis + Obstructive Uropathy  Hypernatremia due to Dehydration. Resolved  Hypercalcemia likely due to dehdyration  Afib (not on AC; deferred by patient and family in outpatient setting)  Dementia  CAD  BPH      PLAN    Continue to monitor Electrolytes and Cr. Correct electrolytes accordinlgy.   Strict I/Os s/p Simons insertion for urinary retention  Nephrology follow up appreciated  IVF as needed for fluid balance  Avoid nephrotoxic agents  Empiric ABX. Cultures NGTD.   SLP evaluation->Diet adjusted accordingly  Palliative care follow up appreciated  MRI brain: no acute CVA  Neurology consult apprecaited    DVT Prophylaxis: Heparin subq      Prognosis- poor     ASSESSMENT:    Severe Sepsis secondary to RLL pneumonia HCAP vs Aspiration Pneumonia  CRISTELA due to Sepsis + Obstructive Uropathy  Hypernatremia due to Dehydration. Resolved  Hypercalcemia likely due to dehdyration  Afib (not on AC; deferred by patient and family in outpatient setting)  Dementia  CAD  BPH      PLAN    leukocytosis increasing, concerns for microaspiration   Continue to monitor Electrolytes and Cr. Correct electrolytes accordingly   Strict I/Os s/p Simons insertion for urinary retention  Nephrology follow up appreciated  IVF as needed for fluid balance  creatinine down trending   Avoid nephrotoxic agents  Empiric ABX. Cultures NGTD.   SLP evaluation->Diet adjusted accordingly, re-eavl pending- concerns for microaspiration   Palliative care follow up appreciated consideration for palliative versus hospice   MRI brain: no acute CVA  Neurology consult apprecaited    DVT Prophylaxis: Heparin subq      Prognosis- poor

## 2021-01-27 NOTE — PROGRESS NOTE ADULT - SUBJECTIVE AND OBJECTIVE BOX
Interval History:  1/27/21: No new events    MEDICATIONS  (STANDING):  aspirin enteric coated 81 milliGRAM(s) Oral daily  cefepime  Injectable. 1000 milliGRAM(s) IV Push every 12 hours  dorzolamide 2%/timolol 0.5% Ophthalmic Solution 1 Drop(s) Both EYES two times a day  heparin   Injectable 5000 Unit(s) SubCutaneous every 12 hours  latanoprost 0.005% Ophthalmic Solution 1 Drop(s) Both EYES at bedtime  sodium chloride 0.45%. 1000 milliLiter(s) (100 mL/Hr) IV Continuous <Continuous>  tamsulosin 0.4 milliGRAM(s) Oral at bedtime    MEDICATIONS  (PRN):  acetaminophen   Tablet .. 650 milliGRAM(s) Oral every 6 hours PRN Temp greater or equal to 38.5C (101.3F), Mild Pain (1 - 3)  acetaminophen  Suppository .. 650 milliGRAM(s) Rectal every 6 hours PRN Temp greater or equal to 38.5C (101.3F)  aluminum hydroxide/magnesium hydroxide/simethicone Suspension 30 milliLiter(s) Oral every 4 hours PRN Dyspepsia  ondansetron Injectable 4 milliGRAM(s) IV Push every 6 hours PRN Nausea      Allergies    No Known Allergies    Intolerances        PHYSICAL EXAM:  Vital Signs Last 24 Hrs  T(F): 96.3 (01-27-21 @ 08:43)  HR: 63 (01-27-21 @ 08:43)  BP: 162/70 (01-27-21 @ 08:43)  RR: 18 (01-27-21 @ 08:43)    GENERAL: NAD, well-groomed, well-developed  HEAD:  Atraumatic, Normocephalic  Neuro:  Somnolent but easily arousable  Paucity of speech. Not following commands  CN: PERRL, EOMI, no nystagmus, no facial weakness, tongue protrudes in the midline  Motor: not cooperative with confrontation testing, but moves all extremities  Sens: withdraws to noxious stimuli in all four extremities  Coord:  unable to cooperate. No involuntary movements noted.        LABS:                        12.7   16.84 )-----------( 243      ( 26 Jan 2021 07:52 )             39.3     01-26    140  |  114<H>  |  96<H>  ----------------------------<  63<L>  4.8   |  16<L>  |  2.63<H>    Ca    10.2<H>      26 Jan 2021 07:52  Phos  4.2     01-26    TPro  x   /  Alb  1.5<L>  /  TBili  x   /  DBili  x   /  AST  x   /  ALT  x   /  AlkPhos  x   01-26          RADIOLOGY & ADDITIONAL STUDIES:  CT head 1/19/21:  Limited exam due to motion  No acute intracranial hemorrhage or acute territorial infarct.  If symptoms persist, follow-up MRI exam recommended.    CT head 1/21/21: no acute intracranial findings    EEG 1/24/21: Moderate to marked generalized slowing    MR Head no contrast 1/26/21:  Moderate periventricular and deep white matter ischemia. 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 Infarctions are seen within the LEFT external capsule and rosibel.

## 2021-01-28 LAB — SARS-COV-2 RNA SPEC QL NAA+PROBE: SIGNIFICANT CHANGE UP

## 2021-01-28 PROCEDURE — 99232 SBSQ HOSP IP/OBS MODERATE 35: CPT

## 2021-01-28 PROCEDURE — 99233 SBSQ HOSP IP/OBS HIGH 50: CPT

## 2021-01-28 RX ADMIN — Medication 650 MILLIGRAM(S): at 21:50

## 2021-01-28 RX ADMIN — AMLODIPINE BESYLATE 2.5 MILLIGRAM(S): 2.5 TABLET ORAL at 11:33

## 2021-01-28 RX ADMIN — TAMSULOSIN HYDROCHLORIDE 0.4 MILLIGRAM(S): 0.4 CAPSULE ORAL at 21:50

## 2021-01-28 RX ADMIN — CEFEPIME 1000 MILLIGRAM(S): 1 INJECTION, POWDER, FOR SOLUTION INTRAMUSCULAR; INTRAVENOUS at 11:56

## 2021-01-28 RX ADMIN — LATANOPROST 1 DROP(S): 0.05 SOLUTION/ DROPS OPHTHALMIC; TOPICAL at 21:52

## 2021-01-28 RX ADMIN — SODIUM CHLORIDE 100 MILLILITER(S): 9 INJECTION, SOLUTION INTRAVENOUS at 11:36

## 2021-01-28 RX ADMIN — HEPARIN SODIUM 5000 UNIT(S): 5000 INJECTION INTRAVENOUS; SUBCUTANEOUS at 21:51

## 2021-01-28 RX ADMIN — DORZOLAMIDE HYDROCHLORIDE TIMOLOL MALEATE 1 DROP(S): 20; 5 SOLUTION/ DROPS OPHTHALMIC at 11:56

## 2021-01-28 RX ADMIN — HEPARIN SODIUM 5000 UNIT(S): 5000 INJECTION INTRAVENOUS; SUBCUTANEOUS at 11:35

## 2021-01-28 RX ADMIN — Medication 650 MILLIGRAM(S): at 11:33

## 2021-01-28 RX ADMIN — DORZOLAMIDE HYDROCHLORIDE TIMOLOL MALEATE 1 DROP(S): 20; 5 SOLUTION/ DROPS OPHTHALMIC at 21:51

## 2021-01-28 RX ADMIN — Medication 81 MILLIGRAM(S): at 11:36

## 2021-01-28 NOTE — PROGRESS NOTE ADULT - CONVERSATION DETAILS
Called Mr. Long's daughter Ayesha to help her finalize a plan. She understands that speech and swallow returned and cleared pt for a diet and that he tolerated his po meds. This, alongside the fact that he does not have any sx necessitating IV sx management makes him ineligible for inpt hospice at this time. Unfortunately, because he lived alone and family cannot take him in themselves or afford to hire 24/hr aids so he can go home with hospice, the only other plan left is for LTC placement. Ayesha is clear that at this point she just wants her father to be comfortable, and is willing to go with this plan so he can have a safe place to be. She notes plan to meet with an Elder  today, which we reassured her was a great thing- as this will be helpful (and appreciated by facilities) in finalizing finances. She accepts that if pt declines here or at facility while arranging all of this a referral for inpt hospice can be made at any time.     In the interim, she agreed with recommendation for full comfort measures here including no further blood draws or imaging. We also revised MOLST to comfort focus so whichever facility pt goes to will have clear instructions and insight into family's goals for Mr. Davsi's care. See below for full list of decisions    Explained that now floor  will call to help further facilitate dc plan with caveat that if at any time while being cared for here at the hospital, that pt declines or develops a need for IV sx management, we can try for inpt referral. Above shared with RN, CAROL Floyd, and floor SUDHA Leal. Will continue to follow with you.
Called Ayesha to update her on how the pt is doing. She denied speaking with the team since we last spoke and remained open to updates and help making a plan. Explained that since we last spoke pt was seen by neurology to help the team discern possible reasons for his ams aside from renal dysfunction. Shared EEG, CT, and MRI results- all ruling out stroke, seizure or intracranial reason for persistent confusion. Noted that though improved, renal function remains abnormal. Also noted that pt came in with pneumonia with elevated wbc count, which she recalls had improved. However, explained that wbc is on the rise again and that this may be due to aspiration. Shared findings at bedside during encounter with pt and also RN's report of concern with swallowing. Added that speech and swallow has been called again to help assess if pt can actually swallow safely.     Ayesha was not very surprised by this, noting that she has been preparing herself for the possibility of her father's decline. She asked about options. Thus, we reviewed the 2 on the table: 1) palliative approach- continue on with current interventions, knowing odds, hoping for more time for renal stabilization and assessment of ability to swallow, still hoping for MIKE eventually; vs. 2) accepting that time has been given to improve and pt did initially but has declined again to a state of being that does not allow him to interact meaningfully with the world as he used to and thus, not having a QOL that would be acceptable to him- making hospice a better choice in that case. Ayesha was thoughtful about these options. She shared honestly that it was a lot to consider and that she would like a little time to think this over. Reassured her that this was ok and allowed team to get impression from speech and swallow cc: safety of intake. She also agreed that if deemed best suited for NPO status that this would help make her more confident about her choice.     Promised to ask to team to call her after this assessment was made and that we would call again tomorrow to help solidify plan. Above shared with primary team.

## 2021-01-28 NOTE — PROGRESS NOTE ADULT - ASSESSMENT
ASSESSMENT:    Severe Sepsis secondary to RLL pneumonia HCAP vs Aspiration Pneumonia  CRISTELA due to Sepsis + Obstructive Uropathy  Hypernatremia due to Dehydration. Resolved  Hypercalcemia likely due to dehdyration  Afib (not on AC; deferred by patient and family in outpatient setting)  Dementia  CAD  BPH      PLAN    leukocytosis increasing, concerns for microaspiration   stop further lab draws as pt is now comfort care   maintain reilly cath for urinary retention  s/p nephrology consult - no interventions given comfort measure approach   s/p IVF   creatinine down trending   Avoid nephrotoxic agents  s/p cefepime Cultures NGTD.   SLP evaluation-> puree diet, feed only when awake, difficult to feed given decreased mentation, caloric intake is likely not sufficient to meet metabolic demands.   Palliative care follow up appreciated consideration for palliative versus hospice   MRI brain: no acute CVA  Neurology consult appreciated    DVT Prophylaxis: Heparin subq    Prognosis- poor   placement pending-  comfort measures only at this time.

## 2021-01-28 NOTE — PROGRESS NOTE ADULT - SUBJECTIVE AND OBJECTIVE BOX
HPI: Pt seen and examined this am in follow up for sx. Pt remains lethargic and slurring words. Appears comfortable.       PAIN: no nonverbal signs of pain    DYSPNEA: no nonverbal signs of dyspnea      ROS:  Unable to gather 2/2 to mental status      PHYSICAL EXAM:    Vital Signs Last 24 Hrs  T(C): 36.1 (28 Jan 2021 07:47), Max: 36.4 (27 Jan 2021 20:56)  T(F): 96.9 (28 Jan 2021 07:47), Max: 97.5 (27 Jan 2021 20:56)  HR: 60 (28 Jan 2021 07:47) (60 - 66)  BP: 143/40 (28 Jan 2021 07:47) (143/40 - 156/78)  BP(mean): --  RR: 20 (28 Jan 2021 07:47) (18 - 20)  SpO2: 99% (28 Jan 2021 07:47) (98% - 99%)  Daily     Daily     PPSV2: 30  %    General: Elderly male lying in bed, ill-appearing, awake and trying to respond but speech garbled, not yet following commands  Mental Status: answers to name, unable to discern further  HEENT: dmm, + temporal wasting  Lungs: dec at bases bl  Cardiac: + s1 s2 rrr  GI: soft nt nd +bs  : incontinent  MSK/skin: moves all extremities spontaneously, some edema in feet, some muscle and fat wasting noted in extremities  Neuro: limited by mentation, responds to touch, garbled speech    LABS:                        11.9   19.76 )-----------( 229      ( 27 Jan 2021 08:46 )             39.2     01-27    141  |  115<H>  |  92<H>  ----------------------------<  62<L>  4.9   |  13<L>  |  2.46<H>    Ca    10.9<H>      27 Jan 2021 08:46  Phos  5.3     01-27    TPro  x   /  Alb  1.4<L>  /  TBili  x   /  DBili  x   /  AST  x   /  ALT  x   /  AlkPhos  x   01-27      Albumin: Albumin, Serum: 1.4 g/dL (01-27 @ 08:46)      Allergies    No Known Allergies    Intolerances      MEDICATIONS  (STANDING):  amLODIPine   Tablet 2.5 milliGRAM(s) Oral daily  aspirin enteric coated 81 milliGRAM(s) Oral daily  cefepime  Injectable. 1000 milliGRAM(s) IV Push every 12 hours  dorzolamide 2%/timolol 0.5% Ophthalmic Solution 1 Drop(s) Both EYES two times a day  heparin   Injectable 5000 Unit(s) SubCutaneous every 12 hours  latanoprost 0.005% Ophthalmic Solution 1 Drop(s) Both EYES at bedtime  sodium bicarbonate 650 milliGRAM(s) Oral two times a day  sodium chloride 0.45%. 1000 milliLiter(s) (100 mL/Hr) IV Continuous <Continuous>  tamsulosin 0.4 milliGRAM(s) Oral at bedtime    MEDICATIONS  (PRN):  acetaminophen   Tablet .. 650 milliGRAM(s) Oral every 6 hours PRN Temp greater or equal to 38.5C (101.3F), Mild Pain (1 - 3)  acetaminophen  Suppository .. 650 milliGRAM(s) Rectal every 6 hours PRN Temp greater or equal to 38.5C (101.3F)  aluminum hydroxide/magnesium hydroxide/simethicone Suspension 30 milliLiter(s) Oral every 4 hours PRN Dyspepsia  ondansetron Injectable 4 milliGRAM(s) IV Push every 6 hours PRN Nausea

## 2021-01-28 NOTE — PROGRESS NOTE ADULT - TREATMENT GUIDELINE COMMENT
c/w current interventions while family thinks and additional impression from speech and swallow comes in
MOLST decisions: DNR, comfort, DNI, do not send, no feeding tube, trial of IVF, determine use of abx    * Spent 22 minutes discussing GOC with family including Advance care planning, explanation and discussion of advance directives, reviewed all treatment/dispo options, and MOLST.

## 2021-01-28 NOTE — PROGRESS NOTE ADULT - SUBJECTIVE AND OBJECTIVE BOX
HPI:  89 M with PMH of Afib, not currently on AC's, BPH,  post hospitalization s/p NSTEMI d/c to subacute rehab presenting to the ED from rehab with AMS, SOB. Per rehab, patient was found today in bed mumbling, not making sense with associated SOB. Sent patient to the ED for further evaluation. Pt is on baby ASA. Unable to obtain full HPI secondary to patients AMS. Admit for HCAP      SUBJECTIVE: Limited ROS/CC as patient is AMS.     1/20-1/21:Overnight Bladder US ~1600cc in bladder -> Simons insertion with 1400cc drained.   1/22: More energy; Still confused and speech is still off. Unsure whether expressive aphasia is related to neurologic event (patient has risks including Afib not on AC) vs Metabolic. Limited ROS/CC  1:23: more lethargic today. Producing less words. Urine output adequate. Cr and Na improving.   1/24: some improvement; speech not coherent; no apparent new focal deficit; Patient resting comfortabley  1/25: No significant change; speech not coherent; no apparent new focal deficit; Patient resting comfortabley; follow minimal commands  01/26/21: Patient seen and examined after coming back from MRI, sleepy, received IV ativan prior to MRI.   1/27: pt seen and examined at bedside. resting comfortably, denies pain or discomfort.   1/28: pt seen and examined at bedside. limited responsiveness, awaiting placement. no new events     ROS   All 10 systems reviewed and found to be negative with the exception of what has been described above.  limited due to aphasia       PHYSICAL EXAM:  Vital Signs Last 24 Hrs  T(C): 36.1 (28 Jan 2021 07:47), Max: 36.4 (27 Jan 2021 20:56)  T(F): 96.9 (28 Jan 2021 07:47), Max: 97.5 (27 Jan 2021 20:56)  HR: 60 (28 Jan 2021 07:47) (60 - 66)  BP: 143/40 (28 Jan 2021 07:47) (143/40 - 156/78)  BP(mean): --  RR: 20 (28 Jan 2021 07:47) (18 - 20)  SpO2: 99% (28 Jan 2021 07:47) (98% - 99%)      General: AAOx0; NAD; lethargic  Head: AT/NC, speech limited   ENT: Dry mucous membranes No Injury  Neck: Non-tender; No JVD  CVS: RRR, S1&S2, No murmur, b/L LE Edema  Respiratory: Decreased bibasilar breath sounds; Normal Respiratory Effort  Abdomen/GI: Soft, non-tender, non-distended, normal bowel sounds  : No bladder distention, Simons in place with dark concentrated urine  Extremites: No cyanosis, No clubbing, LE Edema  Neuro: AAOx1, Limited exam as patient not cooperative.   Psych: Unable to evaluate  Skin: Clean, Dry and Intact      LABS                             11.9   19.76 )-----------( 229      ( 27 Jan 2021 08:46 )             39.2     01-27    141  |  115<H>  |  92<H>  ----------------------------<  62<L>  4.9   |  13<L>  |  2.46<H>    Ca    10.9<H>      27 Jan 2021 08:46  Phos  5.3     01-27    TPro  x   /  Alb  1.4<L>  /  TBili  x   /  DBili  x   /  AST  x   /  ALT  x   /  AlkPhos  x   01-27        LIVER FUNCTIONS - ( 27 Jan 2021 08:46 )  Alb: 1.4 g/dL / Pro: x     / ALK PHOS: x     / ALT: x     / AST: x     / GGT: x             MEDICATIONS  (STANDING):  aspirin enteric coated 81 milliGRAM(s) Oral daily  cefepime  Injectable. 1000 milliGRAM(s) IV Push every 12 hours  dorzolamide 2%/timolol 0.5% Ophthalmic Solution 1 Drop(s) Both EYES two times a day  heparin   Injectable 5000 Unit(s) SubCutaneous every 12 hours  latanoprost 0.005% Ophthalmic Solution 1 Drop(s) Both EYES at bedtime  sodium chloride 0.45%. 1000 milliLiter(s) (100 mL/Hr) IV Continuous <Continuous>  tamsulosin 0.4 milliGRAM(s) Oral at bedtime    MEDICATIONS  (PRN):  acetaminophen   Tablet .. 650 milliGRAM(s) Oral every 6 hours PRN Temp greater or equal to 38.5C (101.3F), Mild Pain (1 - 3)  acetaminophen  Suppository .. 650 milliGRAM(s) Rectal every 6 hours PRN Temp greater or equal to 38.5C (101.3F)  aluminum hydroxide/magnesium hydroxide/simethicone Suspension 30 milliLiter(s) Oral every 4 hours PRN Dyspepsia  ondansetron Injectable 4 milliGRAM(s) IV Push every 6 hours PRN Nausea

## 2021-01-28 NOTE — PROGRESS NOTE ADULT - TREATMENT GUIDELINES
DNI/DNR Order/Comfort measures only/Do not re-hospitalize/No blood draws/No artificial nutrition/Antibiotic trial/IV fluid trial

## 2021-01-28 NOTE — PROGRESS NOTE ADULT - ASSESSMENT
89 year old man admitted with Severe Sepsis secondary to RLL pneumonia HCAP vs Aspiration Pneumonia, CRISTELA due to Sepsis + Obstructive Uropathy, hypernatremia and hypercalcemia likely related to dehydration.     Persistent altered mental status  EEG suggestive of encephalopathy.  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  Avoid nephrotoxic agents.  Cefepime unlikely to have caused initial AMS, but can contribute to this problem, particularly with renal failure.  Palliative care eval appreciated.    Please call if additional input is needed from neurology service.

## 2021-01-28 NOTE — PROVIDER CONTACT NOTE (OTHER) - ACTION/TREATMENT ORDERED:
No new interventions/ orders at this time, continue to monitor. Hold further heparin & aspirin doses, continue to monitor.

## 2021-01-28 NOTE — PROGRESS NOTE ADULT - SUBJECTIVE AND OBJECTIVE BOX
Interval History:  1/28/21: No new events. Patient does not offer complaints    MEDICATIONS  (STANDING):  amLODIPine   Tablet 2.5 milliGRAM(s) Oral daily  aspirin enteric coated 81 milliGRAM(s) Oral daily  cefepime  Injectable. 1000 milliGRAM(s) IV Push every 12 hours  dorzolamide 2%/timolol 0.5% Ophthalmic Solution 1 Drop(s) Both EYES two times a day  heparin   Injectable 5000 Unit(s) SubCutaneous every 12 hours  latanoprost 0.005% Ophthalmic Solution 1 Drop(s) Both EYES at bedtime  sodium bicarbonate 650 milliGRAM(s) Oral two times a day  sodium chloride 0.45%. 1000 milliLiter(s) (100 mL/Hr) IV Continuous <Continuous>  tamsulosin 0.4 milliGRAM(s) Oral at bedtime    MEDICATIONS  (PRN):  acetaminophen   Tablet .. 650 milliGRAM(s) Oral every 6 hours PRN Temp greater or equal to 38.5C (101.3F), Mild Pain (1 - 3)  acetaminophen  Suppository .. 650 milliGRAM(s) Rectal every 6 hours PRN Temp greater or equal to 38.5C (101.3F)  aluminum hydroxide/magnesium hydroxide/simethicone Suspension 30 milliLiter(s) Oral every 4 hours PRN Dyspepsia  ondansetron Injectable 4 milliGRAM(s) IV Push every 6 hours PRN Nausea      Allergies    No Known Allergies    Intolerances        PHYSICAL EXAM:  Vital Signs Last 24 Hrs  T(F): 96.9 (01-28-21 @ 07:47)  HR: 60 (01-28-21 @ 07:47)  BP: 143/40 (01-28-21 @ 07:47)  RR: 20 (01-28-21 @ 07:47)    GENERAL: NAD, well-groomed, well-developed  HEAD:  Atraumatic, Normocephalic  Neuro:  Awake, moaning. Unintelligible speech. Not following commands  CN: PERRL, EOMI, no nystagmus, no facial weakness  motor: not cooperative with testing but no clear deficits seen  coordination: no involuntary movements    LABS:                        11.9   19.76 )-----------( 229      ( 27 Jan 2021 08:46 )             39.2     01-27    141  |  115<H>  |  92<H>  ----------------------------<  62<L>  4.9   |  13<L>  |  2.46<H>    Ca    10.9<H>      27 Jan 2021 08:46  Phos  5.3     01-27    TPro  x   /  Alb  1.4<L>  /  TBili  x   /  DBili  x   /  AST  x   /  ALT  x   /  AlkPhos  x   01-27          RADIOLOGY & ADDITIONAL STUDIES:  CT head 1/19/21:  Limited exam due to motion  No acute intracranial hemorrhage or acute territorial infarct.  If symptoms persist, follow-up MRI exam recommended.    CT head 1/21/21: no acute intracranial findings    EEG 1/24/21: Moderate to marked generalized slowing    MR Head no contrast 1/26/21:  Moderate periventricular and deep white matter ischemia. 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 Infarctions are seen within the LEFT external capsule and rosibel.

## 2021-01-28 NOTE — PROGRESS NOTE ADULT - ASSESSMENT
89y old Male with hx of Afib (not on AC), BPH, w/ recent NSTEMI at [facility] (found after pt admitted there for a fall) and was dc'd to [facility] on 1/13. Now admitted at  on 1/19 for AMS and SOB. Found to have CXR showing R perihilar infiltrate, COVID-19 negative, CTH negative for acute pathology, leukocytosis (wbc>30) and CRISTELA with Cr>4. Palliative Care consulted to assist with establishing GOC.     1) AMS  - daughter clarified for primary team on admission that pt does not have dementia  - likely encephalopathy/hypoactive delirium from underlying infection and metabolic abnormalities  - CTH negative--> repeat also negative  - however still with garbled speech which may be due to kidney dysfunction.  - neurology now also consulted--> no acute infarct on MRI  - eeg done with generalized background slowing c/w encephalopathy and no evidence of seizure  - fall and aspiration precautions    2) HCAP  - CXR showing infiltrate on R  - IV abx  - supplemental O2 as needed  - can also consider addition of mucinex for audible secretions  - COVID negative x2  - WBC improved initially and now on the rise again. Seems possible that this could be due to microaspiration considering pt having trouble with swallowing    3) CRISTELA  - unclear, possibly do to poor po intake  - renal US showed retention of >1L--> reilly in place now draining well   - nephrology consult appreciated, fluids adjusted, monitoring  - Cr elevated>4, but slowly improving. Likely to continue improving with reilly placed  - avoid nephrotoxic agents as able  - heme consulted for possible plasma cell abnormality, finding none    4) Recent NSTEMI  - recently treated for NSTEMI at outside hospital  - ? echo  - ? cardio to follow up and ensure this is not contributing?    5) Debility  - PPS<40%  - speech and swallow eval appreciated, pt cleared for puree with thin liquids, no overt aspiration noted, but variable intake  - suggest nutrition eval given evidence of malnutrition on exam   - as per care coordination notes pt fully independent and living alone, with visits from daughter who helps with errands and appointments, pt does not drive  - PT consulted as pt is coming from rehab-->max assist, not following commands, MIKE recommended    6) Prognosis  - guarded  - pt with advanced age, significant comorbidities, including recent hospitalization for NSTEMI, now with evidence of multiorgan compromise, recent fall, PPS<40% at this time, albumin 1.8 with evidence of malnutrition on exam; pt at increased risk for complications    7) GOC/Advanced Directives  - pt does not currently have capacity for decision making, but according to EMR, pt was was cognitively intact though very [place] prior  - surrogate decision maker is daughter Ayesha Le [phone]  - MOLST on chart 1/19: DNR and DNI --> revised 1/28: DNR, comfort, DNI, do not send, no feeding tube, trial of IVF, determine use of abx  - GOC meeting initiated 1/20- plan to give pt more time to see if he can improve, as desired plan is for MIKE and home. Follow up meetings held as pt progressed. Now daughter on board with comfort focus and LTC placement since pt not currently inpt hospice eligible. See above embedded GOC note for additional details    Thank you for including us in Mr. Long's care. Will continue to follow with you.    Ridge Oakley MD  Palliative Care Attending

## 2021-01-29 ENCOUNTER — TRANSCRIPTION ENCOUNTER (OUTPATIENT)
Age: 86
End: 2021-01-29

## 2021-01-29 VITALS
SYSTOLIC BLOOD PRESSURE: 118 MMHG | DIASTOLIC BLOOD PRESSURE: 86 MMHG | RESPIRATION RATE: 18 BRPM | TEMPERATURE: 96 F | OXYGEN SATURATION: 99 % | HEART RATE: 69 BPM

## 2021-01-29 PROCEDURE — 99232 SBSQ HOSP IP/OBS MODERATE 35: CPT

## 2021-01-29 PROCEDURE — 99233 SBSQ HOSP IP/OBS HIGH 50: CPT

## 2021-01-29 RX ORDER — LATANOPROST 0.05 MG/ML
1 SOLUTION/ DROPS OPHTHALMIC; TOPICAL
Qty: 0 | Refills: 0 | DISCHARGE
Start: 2021-01-29

## 2021-01-29 RX ORDER — AMLODIPINE BESYLATE 2.5 MG/1
1 TABLET ORAL
Qty: 0 | Refills: 0 | DISCHARGE
Start: 2021-01-29

## 2021-01-29 RX ORDER — DORZOLAMIDE HYDROCHLORIDE TIMOLOL MALEATE 20; 5 MG/ML; MG/ML
1 SOLUTION/ DROPS OPHTHALMIC
Qty: 0 | Refills: 0 | DISCHARGE
Start: 2021-01-29

## 2021-01-29 RX ORDER — DORZOLAMIDE HYDROCHLORIDE TIMOLOL MALEATE 20; 5 MG/ML; MG/ML
1 SOLUTION/ DROPS OPHTHALMIC
Qty: 0 | Refills: 0 | DISCHARGE

## 2021-01-29 RX ORDER — TAMSULOSIN HYDROCHLORIDE 0.4 MG/1
1 CAPSULE ORAL
Qty: 0 | Refills: 0 | DISCHARGE
Start: 2021-01-29

## 2021-01-29 RX ORDER — LATANOPROST 0.05 MG/ML
1 SOLUTION/ DROPS OPHTHALMIC; TOPICAL
Qty: 0 | Refills: 0 | DISCHARGE

## 2021-01-29 RX ADMIN — SODIUM CHLORIDE 100 MILLILITER(S): 9 INJECTION, SOLUTION INTRAVENOUS at 07:56

## 2021-01-29 RX ADMIN — AMLODIPINE BESYLATE 2.5 MILLIGRAM(S): 2.5 TABLET ORAL at 10:41

## 2021-01-29 RX ADMIN — Medication 650 MILLIGRAM(S): at 10:41

## 2021-01-29 RX ADMIN — DORZOLAMIDE HYDROCHLORIDE TIMOLOL MALEATE 1 DROP(S): 20; 5 SOLUTION/ DROPS OPHTHALMIC at 10:41

## 2021-01-29 NOTE — CHART NOTE - NSCHARTNOTEFT_GEN_A_CORE
Informed by RN that pt is noted to have gross hematuria in reilly bag after patients position was changed. Pt is current comfort care only. Will hold Aspirin and Heparin. Continue to monitor closely.

## 2021-01-29 NOTE — PROGRESS NOTE ADULT - REASON FOR ADMISSION
lethargy
lethargy/AMS
CRISTELA
lethargy

## 2021-01-29 NOTE — DISCHARGE NOTE PROVIDER - CARE PROVIDER_API CALL
Dillan Benson  EMERGENCY MEDICINE  [address] [place] [address]  Phone: [phone]  Fax: [phone]  Follow Up Time:

## 2021-01-29 NOTE — DISCHARGE NOTE PROVIDER - DETAILS OF MALNUTRITION DIAGNOSIS/DIAGNOSES
This patient has been assessed with a concern for Malnutrition and was treated during this hospitalization for the following Nutrition diagnosis/diagnoses:     -  01/20/2021: Severe protein-calorie malnutrition

## 2021-01-29 NOTE — PROGRESS NOTE ADULT - ASSESSMENT
ASSESSMENT:    Severe Sepsis secondary to RLL pneumonia HCAP vs Aspiration Pneumonia  CRISTELA due to Sepsis + Obstructive Uropathy  Hypernatremia due to Dehydration. Resolved  Hypercalcemia likely due to dehdyration  Afib (not on AC; deferred by patient and family in outpatient setting)  Dementia  CAD  BPH      PLAN    leukocytosis increasing, concerns for microaspiration   stop further lab draws as pt is now comfort care   maintain reilly cath for urinary retention  s/p nephrology consult - no interventions given comfort measure approach   s/p IVF   creatinine down trending   Avoid nephrotoxic agents  s/p cefepime Cultures NGTD.   SLP evaluation-> puree diet, feed only when awake, difficult to feed given decreased mentation, caloric intake is likely not sufficient to meet metabolic demands.   Palliative care follow up appreciated consideration for palliative versus hospice   MRI brain: no acute CVA  Neurology consult appreciated    DVT Prophylaxis: Heparin subq    Prognosis- poor   placement pending-  comfort measures only at this time.       above plan discussed with pt, porsche Hodge, bedside RN, and MD Iqbal

## 2021-01-29 NOTE — PROGRESS NOTE ADULT - SUBJECTIVE AND OBJECTIVE BOX
HPI: Pt seen and examined this am in follow up for sx. Pt remains lethargic and slurring. He appears comfortable. Not able to gather further. Overnight hematuria events noted.       PAIN: no nonverbal signs of pain    DYSPNEA: no nonverbal sign of dyspnea      ROS:  unable to gather       PHYSICAL EXAM:    Vital Signs Last 24 Hrs  T(C): 35.7 (29 Jan 2021 08:03), Max: 35.9 (28 Jan 2021 15:32)  T(F): 96.3 (29 Jan 2021 08:03), Max: 96.6 (28 Jan 2021 15:32)  HR: 57 (29 Jan 2021 08:03) (57 - 69)  BP: 121/46 (29 Jan 2021 08:03) (121/46 - 145/70)  BP(mean): --  RR: 19 (29 Jan 2021 08:03) (18 - 19)  SpO2: 98% (29 Jan 2021 08:03) (97% - 98%)  Daily     Daily     PPSV2: 30  %    General: Elderly male lying in bed, ill-appearing, awake and trying to respond but speech garbled, not yet following commands  Mental Status: answers to name, unable to discern further  HEENT: dmm, + temporal wasting  Lungs: dec at bases bl  Cardiac: + s1 s2 rrr  GI: soft nt nd +bs  : incontinent  MSK/skin: moves all extremities spontaneously, some edema in feet, some muscle and fat wasting noted in extremities  Neuro: limited by mentation, responds to touch, garbled speech    LABS: none    Albumin: Albumin, Serum: 1.4 g/dL (01-27 @ 08:46)      Allergies    No Known Allergies    Intolerances      MEDICATIONS  (STANDING):  amLODIPine   Tablet 2.5 milliGRAM(s) Oral daily  dorzolamide 2%/timolol 0.5% Ophthalmic Solution 1 Drop(s) Both EYES two times a day  latanoprost 0.005% Ophthalmic Solution 1 Drop(s) Both EYES at bedtime  sodium bicarbonate 650 milliGRAM(s) Oral two times a day  sodium chloride 0.45%. 1000 milliLiter(s) (100 mL/Hr) IV Continuous <Continuous>  tamsulosin 0.4 milliGRAM(s) Oral at bedtime    MEDICATIONS  (PRN):  acetaminophen   Tablet .. 650 milliGRAM(s) Oral every 6 hours PRN Temp greater or equal to 38.5C (101.3F), Mild Pain (1 - 3)  acetaminophen  Suppository .. 650 milliGRAM(s) Rectal every 6 hours PRN Temp greater or equal to 38.5C (101.3F)  aluminum hydroxide/magnesium hydroxide/simethicone Suspension 30 milliLiter(s) Oral every 4 hours PRN Dyspepsia  ondansetron Injectable 4 milliGRAM(s) IV Push every 6 hours PRN Nausea      RADIOLOGY:

## 2021-01-29 NOTE — PROGRESS NOTE ADULT - ASSESSMENT
89y old Male with hx of Afib (not on AC), BPH, w/ recent NSTEMI at [facility] (found after pt admitted there for a fall) and was dc'd to [facility] on 1/13. Now admitted at  on 1/19 for AMS and SOB. Found to have CXR showing R perihilar infiltrate, COVID-19 negative, CTH negative for acute pathology, leukocytosis (wbc>30) and CRISTELA with Cr>4. Palliative Care consulted to assist with establishing GOC.     1) AMS  - daughter clarified for primary team on admission that pt does not have dementia  - likely encephalopathy/hypoactive delirium from underlying infection and metabolic abnormalities  - CTH negative--> repeat also negative  - however still with garbled speech which may be due to kidney dysfunction.  - neurology now also consulted--> no acute infarct on MRI  - eeg done with generalized background slowing c/w encephalopathy and no evidence of seizure  - fall and aspiration precautions    2) HCAP  - CXR showing infiltrate on R  - IV abx  - supplemental O2 as needed  - can also consider addition of mucinex for audible secretions  - COVID negative x2  - WBC improved initially and now on the rise again. Seems possible that this could be due to microaspiration considering pt having trouble with swallowing    3) CRISTELA  - unclear, possibly do to poor po intake  - renal US showed retention of >1L--> reilly in place now draining well   - nephrology consult appreciated, fluids adjusted, monitoring  - Cr elevated>4, but slowly improving. Likely to continue improving with reilly placed  - avoid nephrotoxic agents as able  - heme consulted for possible plasma cell abnormality, finding none    4) Debility  - PPS<40%  - speech and swallow eval appreciated, pt cleared for puree with thin liquids, no overt aspiration noted, but variable intake  - suggest nutrition eval given evidence of malnutrition on exam   - as per care coordination notes pt fully independent and living alone, with visits from daughter who helps with errands and appointments, pt does not drive  - PT consulted as pt is coming from rehab-->max assist, not following commands, MIKE recommended    5) Prognosis  - guarded  - pt with advanced age, significant comorbidities, including recent hospitalization for NSTEMI, now with evidence of multiorgan compromise, recent fall, PPS<40% at this time, albumin 1.8 with evidence of malnutrition on exam; pt at increased risk for complications    6) GOC/Advanced Directives  - pt does not currently have capacity for decision making, but according to EMR, pt was was cognitively intact though very [place] prior  - surrogate decision maker is daughter Ayesha Le [phone]  - [facility]OLST on chart 1/19: DNR and DNI --> revised 1/28: DNR, comfort, DNI, do not send, no feeding tube, trial of IVF, determine use of abx  - GOC meeting initiated 1/20- plan to give pt more time to see if he can improve, as desired plan is for MIKE and home. Follow up meetings held as pt progressed. Now daughter on board with comfort focus and LTC placement since pt not currently inpt hospice eligible. See GOC embedded in 1/28 prog note for additional details    Thank you for including us in Mr. Long's care. Will continue to follow with you.    Ridge Oakley MD  Palliative Care Attending

## 2021-01-29 NOTE — DISCHARGE NOTE NURSING/CASE MANAGEMENT/SOCIAL WORK - PATIENT PORTAL LINK FT
You can access the FollowMyHealth Patient Portal offered by [facility] by registering at the following website: [email or web address]. By joining [employer]’s FollowMyHealth portal, you will also be able to view your health information using other applications (apps) compatible with our system.

## 2021-01-29 NOTE — DISCHARGE NOTE PROVIDER - HOSPITAL COURSE
88 y/o male w/ pmhx of AFIB no on AC, BPH, NSTEMI, presented on 1/19 from [facility] for eval of aphasia, dyspnea found to have HCAP associated encephalopathy with subsequent CRISTELA and urinary retention. reilly placed, electrolytes and fluids repleted. pt also found to have microaspiration likely causing PNA.  during hosptialization pt evaled by Neuro - Dr. Jenkins due to persistent AMS - EEG with encephalopathy and MRI w/o acute infarct, however old infarcts seen. no acute intervention   pt evaled by Nephro Dr. Ruiz due to CRISTELA/hypernatremia secondary to ACE-I/ dehydration and hypercalcemia. IVF - 1/2 NS completed, now dc'd given overloaded state and comfort based approach.   Pt w/ limited responsiveness/aphasia and concerns for poor overall prognosis- therefore was evaled by palliative Dr. Oakley. - daughter Ayesha has had decision for comfort based approach - pt is now dnr/dni/do not hospitalize/no feeding tube and trial of IVF. pt is not hospice eligible at this time and can be discharged to SNF for continuation of care/comfort.     PE   General: AAOx0; NAD; lethargic  Head: AT/NC, speech limited   ENT: Dry mucous membranes No Injury  Neck: Non-tender; No JVD  CVS: RRR, S1&S2, No murmur, b/L LE Edema  Respiratory: diffuse crackles bilat lung fields Normal Respiratory Effort  Abdomen/GI: Soft, non-tender, non-distended, normal bowel sounds  : No bladder distention, Reilly in place with dark concentrated urine  Extremites: No cyanosis, No clubbing, + 2 LE Edema  Neuro: AAOx1, Limited exam as patient not cooperative.   Psych: Unable to evaluate  Skin: Clean, Dry and Intact      LABS:                11.9   19.76 )-----------( 229      ( 27 Jan 2021 08:46 )             39.2     01-27    141  |  115<H>  |  92<H>  ----------------------------<  62<L>  4.9   |  13<L>  |  2.46<H>    Ca    10.9<H>      27 Jan 2021 08:46  Phos  5.3     01-27    TPro  x   /  Alb  1.4<L>  /  TBili  x   /  DBili  x   /  AST  x   /  ALT  x   /  AlkPhos  x   01-27    LIVER FUNCTIONS - ( 27 Jan 2021 08:46 )  Alb: 1.4 g/dL / Pro: x     / ALK PHOS: x     / ALT: x     / AST: x     / GGT: x           < from: MR Head No Cont (01.26.21 @ 09:33) >    IMPRESSION:   Moderate periventricular and deep white matter ischemia. 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 Infarctions are seen within the LEFT external capsule and rosibel.    < end of copied text >    PLAN     Severe Sepsis secondary to RLL pneumonia HCAP vs Aspiration Pneumonia  CRISTELA due to Sepsis + Obstructive Uropathy  Hypernatremia due to Dehydration. Resolved  Hypercalcemia likely due to dehdyration  Afib (not on AC; deferred by patient and family in outpatient setting)  Dementia  CAD  BPH      leukocytosis increasing, concerns for microaspiration   stop further lab draws as pt is now comfort care   maintain reilly cath for urinary retention/comfort   s/p nephrology consult - no interventions given comfort measure approach   s/p IVF   creatinine down trending   Avoid nephrotoxic agents  s/p cefepime Cultures NGTD.   SLP evaluation-> puree diet, feed only when awake, difficult to feed given decreased mentation, caloric intake is likely not sufficient to meet metabolic demands.   Palliative care follow up appreciated consideration for palliative versus hospice   MRI brain: no acute CVA  Neurology consult appreciated    DVT Prophylaxis: s/p Heparin subq    Prognosis- poor   placement pending-  comfort measures only at this time.       above plan discussed with pt, porsche Hodge, bedside RN, and MD Iqbal

## 2021-01-29 NOTE — DISCHARGE NOTE PROVIDER - NSDCCPCAREPLAN_GEN_ALL_CORE_FT
PRINCIPAL DISCHARGE DIAGNOSIS  Diagnosis: HCAP (healthcare-associated pneumonia)  Assessment and Plan of Treatment: You were found to have pneumonia which is an infection in one or both of the lungs. It causes the air sacs of the lungs to fill up with fluid or pus. It can range from mild to severe, depending on the type of germ causing the infection, your age, and your overall health.      SECONDARY DISCHARGE DIAGNOSES  Diagnosis: Simons catheter in place  Assessment and Plan of Treatment: How do I care for my catheter and drainage bag?   - Wash your hands often. Wash before and after you touch your catheter, tubing, or drainage bag.   - Clean your genital area 2 times every day. For men: Use a soapy cloth to clean the tip of your penis. Start where the catheter enters  - Secure the catheter tube so you do not pull or move the catheter. This helps prevent pain and bladder spasms.   - Keep the drainage bag below the level of your waist. This helps stop urine from moving back up the tubing and into your bladder  - Empty the drainage bag when needed. The weight of a full drainage bag can be painful. Empty the drainage bag every 3 to 6 hours or when it is ? full.    Diagnosis: Hospice care patient  Assessment and Plan of Treatment: you are being discharged with a comfort care approach.  This means that care is focused on being comfortable and not in pain and not focused on intensive treatment.  do not force food/water or medications if you do not want them.

## 2021-01-29 NOTE — DISCHARGE NOTE PROVIDER - NSDCMRMEDTOKEN_GEN_ALL_CORE_FT
acetaminophen 500 mg oral tablet: 1 tab(s) orally every 4 hours, As Needed  amLODIPine 2.5 mg oral tablet: 1 tab(s) orally once a day  dorzolamide-timolol 2%-0.5% preservative-free ophthalmic solution: 1 drop(s) to each affected eye 2 times a day  ipratropium-albuterol 0.5 mg-2.5 mg/3 mLinhalation solution: 3 milliliter(s) inhaled 4 times a day, As Needed  latanoprost 0.005% ophthalmic solution: 1 drop(s) to each affected eye once a day (at bedtime)  lisinopril 10 mg oral tablet: 1 tab(s) orally once a day  tamsulosin 0.4 mg oral capsule: 1 cap(s) orally once a day (at bedtime)

## 2021-01-29 NOTE — PROGRESS NOTE ADULT - PROVIDER SPECIALTY LIST ADULT
Hospitalist
Nephrology
Nephrology
Neurology
Palliative Care
Palliative Care
Hospitalist
Nephrology
Neurology
Palliative Care
Nephrology
Nephrology
Hospitalist

## 2021-01-29 NOTE — PROGRESS NOTE ADULT - NUTRITIONAL ASSESSMENT
This patient has been assessed with a concern for Malnutrition and has been determined to have a diagnosis/diagnoses of Severe protein-calorie malnutrition.    This patient is being managed with:   Diet Dysphagia 1 Pureed-Thin Liquids-  Entered: Jan 21 2021  9:17AM    
This patient has been assessed with a concern for Malnutrition and has been determined to have a diagnosis/diagnoses of Severe protein-calorie malnutrition.    This patient is being managed with:   Diet NPO-  Except Medications  Entered: Jan 19 2021  4:10PM    
This patient has been assessed with a concern for Malnutrition and has been determined to have a diagnosis/diagnoses of Severe protein-calorie malnutrition.    This patient is being managed with:   Diet Dysphagia 1 Pureed-Thin Liquids-  Entered: Jan 21 2021  9:17AM    

## 2021-01-29 NOTE — PROGRESS NOTE ADULT - SUBJECTIVE AND OBJECTIVE BOX
HPI:  89 M with PMH of Afib, not currently on AC's, BPH,  post hospitalization s/p NSTEMI d/c to subacute rehab presenting to the ED from rehab with AMS, SOB. Per rehab, patient was found today in bed mumbling, not making sense with associated SOB. Sent patient to the ED for further evaluation. Pt is on baby ASA. Unable to obtain full HPI secondary to patients AMS. Admit for HCAP      SUBJECTIVE: Limited ROS/CC as patient is AMS.     1/20-1/21:Overnight Bladder US ~1600cc in bladder -> Simons insertion with 1400cc drained.   1/22: More energy; Still confused and speech is still off. Unsure whether expressive aphasia is related to neurologic event (patient has risks including Afib not on AC) vs Metabolic. Limited ROS/CC  1:23: more lethargic today. Producing less words. Urine output adequate. Cr and Na improving.   1/24: some improvement; speech not coherent; no apparent new focal deficit; Patient resting comfortabley  1/25: No significant change; speech not coherent; no apparent new focal deficit; Patient resting comfortabley; follow minimal commands  01/26/21: Patient seen and examined after coming back from MRI, sleepy, received IV ativan prior to MRI.   1/27: pt seen and examined at bedside. resting comfortably, denies pain or discomfort.   1/28: pt seen and examined at bedside. limited responsiveness, awaiting placement. no new events   1/29: pt examined at bedside. tracking with eyes however is aphasic.     ROS   All 10 systems reviewed and found to be negative with the exception of what has been described above.  limited due to aphasia       PHYSICAL EXAM:  Vital Signs Last 24 Hrs  T(C): 35.7 (29 Jan 2021 08:03), Max: 35.7 (29 Jan 2021 08:03)  T(F): 96.3 (29 Jan 2021 08:03), Max: 96.3 (29 Jan 2021 08:03)  HR: 57 (29 Jan 2021 08:03) (57 - 69)  BP: 121/46 (29 Jan 2021 08:03) (121/46 - 145/70)  BP(mean): --  RR: 19 (29 Jan 2021 08:03) (18 - 19)  SpO2: 98% (29 Jan 2021 08:03) (97% - 98%)      General: AAOx0; NAD; lethargic  Head: AT/NC, speech limited   ENT: Dry mucous membranes No Injury  Neck: Non-tender; No JVD  CVS: RRR, S1&S2, No murmur, b/L LE Edema  Respiratory: diffuse crackles bilat lung fields Normal Respiratory Effort  Abdomen/GI: Soft, non-tender, non-distended, normal bowel sounds  : No bladder distention, Simons in place with dark concentrated urine  Extremites: No cyanosis, No clubbing, + 2 LE Edema  Neuro: AAOx1, Limited exam as patient not cooperative.   Psych: Unable to evaluate  Skin: Clean, Dry and Intact      LABS                             11.9   19.76 )-----------( 229      ( 27 Jan 2021 08:46 )             39.2     01-27    141  |  115<H>  |  92<H>  ----------------------------<  62<L>  4.9   |  13<L>  |  2.46<H>    Ca    10.9<H>      27 Jan 2021 08:46  Phos  5.3     01-27    TPro  x   /  Alb  1.4<L>  /  TBili  x   /  DBili  x   /  AST  x   /  ALT  x   /  AlkPhos  x   01-27        LIVER FUNCTIONS - ( 27 Jan 2021 08:46 )  Alb: 1.4 g/dL / Pro: x     / ALK PHOS: x     / ALT: x     / AST: x     / GGT: x               MEDICATIONS  (STANDING):  amLODIPine   Tablet 2.5 milliGRAM(s) Oral daily  dorzolamide 2%/timolol 0.5% Ophthalmic Solution 1 Drop(s) Both EYES two times a day  latanoprost 0.005% Ophthalmic Solution 1 Drop(s) Both EYES at bedtime  sodium bicarbonate 650 milliGRAM(s) Oral two times a day  tamsulosin 0.4 milliGRAM(s) Oral at bedtime    MEDICATIONS  (PRN):  acetaminophen   Tablet .. 650 milliGRAM(s) Oral every 6 hours PRN Temp greater or equal to 38.5C (101.3F), Mild Pain (1 - 3)  acetaminophen  Suppository .. 650 milliGRAM(s) Rectal every 6 hours PRN Temp greater or equal to 38.5C (101.3F)  aluminum hydroxide/magnesium hydroxide/simethicone Suspension 30 milliLiter(s) Oral every 4 hours PRN Dyspepsia  ondansetron Injectable 4 milliGRAM(s) IV Push every 6 hours PRN Nausea

## 2021-02-04 DIAGNOSIS — Z51.5 ENCOUNTER FOR PALLIATIVE CARE: ICD-10-CM

## 2021-02-04 DIAGNOSIS — A41.9 SEPSIS, UNSPECIFIED ORGANISM: ICD-10-CM

## 2021-02-04 DIAGNOSIS — I10 ESSENTIAL (PRIMARY) HYPERTENSION: ICD-10-CM

## 2021-02-04 DIAGNOSIS — E43 UNSPECIFIED SEVERE PROTEIN-CALORIE MALNUTRITION: ICD-10-CM

## 2021-02-04 DIAGNOSIS — E87.0 HYPEROSMOLALITY AND HYPERNATREMIA: ICD-10-CM

## 2021-02-04 DIAGNOSIS — Z79.82 LONG TERM (CURRENT) USE OF ASPIRIN: ICD-10-CM

## 2021-02-04 DIAGNOSIS — E86.0 DEHYDRATION: ICD-10-CM

## 2021-02-04 DIAGNOSIS — E83.52 HYPERCALCEMIA: ICD-10-CM

## 2021-02-04 DIAGNOSIS — G93.41 METABOLIC ENCEPHALOPATHY: ICD-10-CM

## 2021-02-04 DIAGNOSIS — J69.0 PNEUMONITIS DUE TO INHALATION OF FOOD AND VOMIT: ICD-10-CM

## 2021-02-04 DIAGNOSIS — Z66 DO NOT RESUSCITATE: ICD-10-CM

## 2021-02-04 DIAGNOSIS — R32 UNSPECIFIED URINARY INCONTINENCE: ICD-10-CM

## 2021-02-04 DIAGNOSIS — R47.01 APHASIA: ICD-10-CM

## 2021-02-04 DIAGNOSIS — N13.30 UNSPECIFIED HYDRONEPHROSIS: ICD-10-CM

## 2021-02-04 DIAGNOSIS — R33.9 RETENTION OF URINE, UNSPECIFIED: ICD-10-CM

## 2021-02-04 DIAGNOSIS — N17.9 ACUTE KIDNEY FAILURE, UNSPECIFIED: ICD-10-CM

## 2021-02-04 DIAGNOSIS — I25.2 OLD MYOCARDIAL INFARCTION: ICD-10-CM

## 2021-02-04 DIAGNOSIS — N40.0 BENIGN PROSTATIC HYPERPLASIA WITHOUT LOWER URINARY TRACT SYMPTOMS: ICD-10-CM

## 2021-02-04 DIAGNOSIS — E87.2 ACIDOSIS: ICD-10-CM

## 2021-02-04 DIAGNOSIS — H40.9 UNSPECIFIED GLAUCOMA: ICD-10-CM

## 2021-02-04 DIAGNOSIS — I25.10 ATHEROSCLEROTIC HEART DISEASE OF NATIVE CORONARY ARTERY WITHOUT ANGINA PECTORIS: ICD-10-CM

## 2021-02-04 DIAGNOSIS — F03.90 UNSPECIFIED DEMENTIA, UNSPECIFIED SEVERITY, WITHOUT BEHAVIORAL DISTURBANCE, PSYCHOTIC DISTURBANCE, MOOD DISTURBANCE, AND ANXIETY: ICD-10-CM

## 2021-02-04 DIAGNOSIS — D89.2 HYPERGAMMAGLOBULINEMIA, UNSPECIFIED: ICD-10-CM

## 2021-02-04 DIAGNOSIS — R65.20 SEVERE SEPSIS WITHOUT SEPTIC SHOCK: ICD-10-CM

## 2021-02-04 DIAGNOSIS — R31.0 GROSS HEMATURIA: ICD-10-CM

## 2021-02-04 DIAGNOSIS — H35.30 UNSPECIFIED MACULAR DEGENERATION: ICD-10-CM

## 2021-02-04 LAB — PTH RELATED PROT SERPL-MCNC: 2.9 PMOL/L — SIGNIFICANT CHANGE UP

## 2021-02-06 LAB — PTH RELATED PROT SERPL-MCNC: 3.4 PMOL/L — SIGNIFICANT CHANGE UP
